# Patient Record
Sex: FEMALE | Race: WHITE | NOT HISPANIC OR LATINO | Employment: FULL TIME | URBAN - METROPOLITAN AREA
[De-identification: names, ages, dates, MRNs, and addresses within clinical notes are randomized per-mention and may not be internally consistent; named-entity substitution may affect disease eponyms.]

---

## 2018-04-04 LAB — HCV AB SER-ACNC: NEGATIVE

## 2019-08-27 ENCOUNTER — OFFICE VISIT (OUTPATIENT)
Dept: FAMILY MEDICINE CLINIC | Facility: CLINIC | Age: 54
End: 2019-08-27
Payer: COMMERCIAL

## 2019-08-27 VITALS
HEIGHT: 64 IN | HEART RATE: 45 BPM | DIASTOLIC BLOOD PRESSURE: 82 MMHG | BODY MASS INDEX: 37.56 KG/M2 | TEMPERATURE: 97.8 F | SYSTOLIC BLOOD PRESSURE: 128 MMHG | RESPIRATION RATE: 14 BRPM | WEIGHT: 220 LBS | OXYGEN SATURATION: 99 %

## 2019-08-27 DIAGNOSIS — Z12.31 ENCOUNTER FOR SCREENING MAMMOGRAM FOR BREAST CANCER: ICD-10-CM

## 2019-08-27 DIAGNOSIS — I10 ESSENTIAL HYPERTENSION: ICD-10-CM

## 2019-08-27 DIAGNOSIS — F51.01 PRIMARY INSOMNIA: ICD-10-CM

## 2019-08-27 DIAGNOSIS — M17.0 PRIMARY OSTEOARTHRITIS OF BOTH KNEES: ICD-10-CM

## 2019-08-27 DIAGNOSIS — E78.2 MIXED HYPERLIPIDEMIA: Primary | ICD-10-CM

## 2019-08-27 DIAGNOSIS — Z12.11 SCREENING FOR COLORECTAL CANCER: ICD-10-CM

## 2019-08-27 DIAGNOSIS — Z12.12 SCREENING FOR COLORECTAL CANCER: ICD-10-CM

## 2019-08-27 DIAGNOSIS — I48.0 PAF (PAROXYSMAL ATRIAL FIBRILLATION) (HCC): ICD-10-CM

## 2019-08-27 DIAGNOSIS — Z29.9 PREVENTIVE MEASURE: ICD-10-CM

## 2019-08-27 PROCEDURE — 3008F BODY MASS INDEX DOCD: CPT | Performed by: FAMILY MEDICINE

## 2019-08-27 PROCEDURE — 99204 OFFICE O/P NEW MOD 45 MIN: CPT | Performed by: FAMILY MEDICINE

## 2019-08-27 PROCEDURE — 3074F SYST BP LT 130 MM HG: CPT | Performed by: FAMILY MEDICINE

## 2019-08-27 PROCEDURE — 1036F TOBACCO NON-USER: CPT | Performed by: FAMILY MEDICINE

## 2019-08-27 RX ORDER — TRAMADOL HYDROCHLORIDE 50 MG/1
50 TABLET ORAL EVERY 6 HOURS PRN
Qty: 60 TABLET | Refills: 1 | Status: SHIPPED | OUTPATIENT
Start: 2019-08-27 | End: 2021-08-10

## 2019-08-27 RX ORDER — FLECAINIDE ACETATE 50 MG/1
50 TABLET ORAL 2 TIMES DAILY
COMMUNITY
Start: 2019-05-16 | End: 2019-08-27 | Stop reason: SDUPTHER

## 2019-08-27 RX ORDER — ZOLPIDEM TARTRATE 10 MG/1
10 TABLET ORAL DAILY PRN
COMMUNITY
Start: 2019-06-07 | End: 2019-08-27 | Stop reason: SDUPTHER

## 2019-08-27 RX ORDER — NICOTINE POLACRILEX 2 MG
1 GUM BUCCAL DAILY
COMMUNITY
End: 2021-08-10

## 2019-08-27 RX ORDER — FLECAINIDE ACETATE 50 MG/1
50 TABLET ORAL 2 TIMES DAILY
Qty: 180 TABLET | Refills: 0 | Status: SHIPPED | OUTPATIENT
Start: 2019-08-27 | End: 2019-10-25 | Stop reason: SDUPTHER

## 2019-08-27 RX ORDER — METOPROLOL SUCCINATE 25 MG/1
12.5 TABLET, EXTENDED RELEASE ORAL DAILY
COMMUNITY
Start: 2018-10-17 | End: 2019-08-27 | Stop reason: SDUPTHER

## 2019-08-27 RX ORDER — TRAMADOL HYDROCHLORIDE 50 MG/1
50 TABLET ORAL DAILY
COMMUNITY
Start: 2019-06-10 | End: 2019-08-27 | Stop reason: SDUPTHER

## 2019-08-27 RX ORDER — METOPROLOL SUCCINATE 25 MG/1
12.5 TABLET, EXTENDED RELEASE ORAL DAILY
Qty: 90 TABLET | Refills: 3 | Status: SHIPPED | OUTPATIENT
Start: 2019-08-27 | End: 2019-10-25 | Stop reason: SDUPTHER

## 2019-08-27 RX ORDER — ZOLPIDEM TARTRATE 10 MG/1
10 TABLET ORAL
Qty: 90 TABLET | Refills: 0 | Status: SHIPPED | OUTPATIENT
Start: 2019-08-27 | End: 2021-08-11

## 2019-08-27 RX ORDER — ALPRAZOLAM 0.25 MG/1
1 TABLET ORAL AS NEEDED
COMMUNITY
Start: 2019-02-26 | End: 2019-08-27

## 2019-08-27 NOTE — PATIENT INSTRUCTIONS
Obtain labs  Make appt with cards and gyn  F/u 1 year or as needed    Wellness Visit for Adults   AMBULATORY CARE:   A wellness visit  is when you see your healthcare provider to get screened for health problems  You can also get advice on how to stay healthy  Write down your questions so you remember to ask them  Ask your healthcare provider how often you should have a wellness visit  What happens at a wellness visit:  Your healthcare provider will ask about your health, and your family history of health problems  This includes high blood pressure, heart disease, and cancer  He or she will ask if you have symptoms that concern you, if you smoke, and about your mood  You may also be asked about your intake of medicines, supplements, food, and alcohol  Any of the following may be done:  · Your weight  will be checked  Your height may also be checked so your body mass index (BMI) can be calculated  Your BMI shows if you are at a healthy weight  · Your blood pressure  and heart rate will be checked  Your temperature may also be checked  · Blood and urine tests  may be done  Blood tests may be done to check your cholesterol levels  Abnormal cholesterol levels increase your risk for heart disease and stroke  You may also need a blood or urine test to check for diabetes if you are at increased risk  Urine tests may be done to look for signs of an infection or kidney disease  · A physical exam  includes checking your heartbeat and lungs with a stethoscope  Your healthcare provider may also check your skin to look for sun damage  · Screening tests  may be recommended  A screening test is done to check for diseases that may not cause symptoms  The screening tests you may need depend on your age, gender, family history, and lifestyle habits  For example, colorectal screening may be recommended if you are 48years old or older    Screening tests you need if you are a woman:   · A Pap smear  is used to screen for cervical cancer  Pap smears are usually done every 3 to 5 years depending on your age  You may need them more often if you have had abnormal Pap smear test results in the past  Ask your healthcare provider how often you should have a Pap smear  · A mammogram  is an x-ray of your breasts to screen for breast cancer  Experts recommend mammograms every 2 years starting at age 48 years  You may need a mammogram at age 52 years or younger if you have an increased risk for breast cancer  Talk to your healthcare provider about when you should start having mammograms and how often you need them  Vaccines you may need:   · Get an influenza vaccine  every year  The influenza vaccine protects you from the flu  Several types of viruses cause the flu  The viruses change over time, so new vaccines are made each year  · Get a tetanus-diphtheria (Td) booster vaccine  every 10 years  This vaccine protects you against tetanus and diphtheria  Tetanus is a severe infection that may cause painful muscle spasms and lockjaw  Diphtheria is a severe bacterial infection that causes a thick covering in the back of your mouth and throat  · Get a human papillomavirus (HPV) vaccine  if you are female and aged 23 to 32 or male 23 to 24 and never received it  This vaccine protects you from HPV infection  HPV is the most common infection spread by sexual contact  HPV may also cause vaginal, penile, and anal cancers  · Get a pneumococcal vaccine  if you are aged 72 years or older  The pneumococcal vaccine is an injection given to protect you from pneumococcal disease  Pneumococcal disease is an infection caused by pneumococcal bacteria  The infection may cause pneumonia, meningitis, or an ear infection  · Get a shingles vaccine  if you are aged 61 or older, even if you have had shingles before  The shingles vaccine is an injection to protect you from the varicella-zoster virus  This is the same virus that causes chickenpox  Shingles is a painful rash that develops in people who had chickenpox or have been exposed to the virus  How to eat healthy:  My Plate is a model for planning healthy meals  It shows the types and amounts of foods that should go on your plate  Fruits and vegetables make up about half of your plate, and grains and protein make up the other half  A serving of dairy is included on the side of your plate  The amount of calories and serving sizes you need depends on your age, gender, weight, and height  Examples of healthy foods are listed below:  · Eat a variety of vegetables  such as dark green, red, and orange vegetables  You can also include canned vegetables low in sodium (salt) and frozen vegetables without added butter or sauces  · Eat a variety of fresh fruits , canned fruit in 100% juice, frozen fruit, and dried fruit  · Include whole grains  At least half of the grains you eat should be whole grains  Examples include whole-wheat bread, wheat pasta, brown rice, and whole-grain cereals such as oatmeal     · Eat a variety of protein foods such as seafood (fish and shellfish), lean meat, and poultry without skin (turkey and chicken)  Examples of lean meats include pork leg, shoulder, or tenderloin, and beef round, sirloin, tenderloin, and extra lean ground beef  Other protein foods include eggs and egg substitutes, beans, peas, soy products, nuts, and seeds  · Choose low-fat dairy products such as skim or 1% milk or low-fat yogurt, cheese, and cottage cheese  · Limit unhealthy fats  such as butter, hard margarine, and shortening  Exercise:  Exercise at least 30 minutes per day on most days of the week  Some examples of exercise include walking, biking, dancing, and swimming  You can also fit in more physical activity by taking the stairs instead of the elevator or parking farther away from stores  Include muscle strengthening activities 2 days each week   Regular exercise provides many health benefits  It helps you manage your weight, and decreases your risk for type 2 diabetes, heart disease, stroke, and high blood pressure  Exercise can also help improve your mood  Ask your healthcare provider about the best exercise plan for you  General health and safety guidelines:   · Do not smoke  Nicotine and other chemicals in cigarettes and cigars can cause lung damage  Ask your healthcare provider for information if you currently smoke and need help to quit  E-cigarettes or smokeless tobacco still contain nicotine  Talk to your healthcare provider before you use these products  · Limit alcohol  A drink of alcohol is 12 ounces of beer, 5 ounces of wine, or 1½ ounces of liquor  · Lose weight, if needed  Being overweight increases your risk of certain health conditions  These include heart disease, high blood pressure, type 2 diabetes, and certain types of cancer  · Protect your skin  Do not sunbathe or use tanning beds  Use sunscreen with a SPF 15 or higher  Apply sunscreen at least 15 minutes before you go outside  Reapply sunscreen every 2 hours  Wear protective clothing, hats, and sunglasses when you are outside  · Drive safely  Always wear your seatbelt  Make sure everyone in your car wears a seatbelt  A seatbelt can save your life if you are in an accident  Do not use your cell phone when you are driving  This could distract you and cause an accident  Pull over if you need to make a call or send a text message  · Practice safe sex  Use latex condoms if are sexually active and have more than one partner  Your healthcare provider may recommend screening tests for sexually transmitted infections (STIs)  · Wear helmets, lifejackets, and protective gear  Always wear a helmet when you ride a bike or motorcycle, go skiing, or play sports that could cause a head injury  Wear protective equipment when you play sports  Wear a lifejacket when you are on a boat or doing water sports    © 2017 [facility] Information is for End User's use only and may not be sold, redistributed or otherwise used for commercial purposes  All illustrations and images included in CareNotes® are the copyrighted property of [employer]  or Jem [employer]al  The above information is an  only  It is not intended as medical advice for individual conditions or treatments  Talk to your doctor, nurse or pharmacist before following any medical regimen to see if it is safe and effective for you  Weight Management   AMBULATORY CARE:   Why it is important to manage your weight:  Being overweight increases your risk of health conditions such as heart disease, high blood pressure, type 2 diabetes, and certain types of cancer  It can also increase your risk for osteoarthritis, sleep apnea, and other respiratory problems  Aim for a slow, steady weight loss  Even a small amount of weight loss can lower your risk of health problems  How to lose weight safely:  A safe and healthy way to lose weight is to eat fewer calories and get regular exercise  You can lose up about 1 pound a week by decreasing the number of calories you eat by 500 calories each day  You can decrease calories by eating smaller portion sizes or by cutting out high-calorie foods  Read labels to find out how many calories are in the foods you eat  You can also burn calories with exercise such as walking, swimming, or biking  You will be more likely to keep weight off if you make these changes part of your lifestyle  Healthy meal plan for weight management:  A healthy meal plan includes a variety of foods, contains fewer calories, and helps you stay healthy  A healthy meal plan includes the following:  · Eat whole-grain foods more often  A healthy meal plan should contain fiber  Fiber is the part of grains, fruits, and vegetables that is not broken down by your body   Whole-grain foods are healthy and provide extra fiber in your diet  Some examples of whole-grain foods are whole-wheat breads and pastas, oatmeal, brown rice, and bulgur  · Eat a variety of vegetables every day  Include dark, leafy greens such as spinach, kale, jaclyn greens, and mustard greens  Eat yellow and orange vegetables such as carrots, sweet potatoes, and winter squash  · Eat a variety of fruits every day  Choose fresh or canned fruit (canned in its own juice or light syrup) instead of juice  Fruit juice has very little or no fiber  · Eat low-fat dairy foods  Drink fat-free (skim) milk or 1% milk  Eat fat-free yogurt and low-fat cottage cheese  Try low-fat cheeses such as mozzarella and other reduced-fat cheeses  · Choose meat and other protein foods that are low in fat  Choose beans or other legumes such as split peas or lentils  Choose fish, skinless poultry (chicken or turkey), or lean cuts of red meat (beef or pork)  Before you cook meat or poultry, cut off any visible fat  · Use less fat and oil  Try baking foods instead of frying them  Add less fat, such as margarine, sour cream, regular salad dressing and mayonnaise to foods  Eat fewer high-fat foods  Some examples of high-fat foods include french fries, doughnuts, ice cream, and cakes  · Eat fewer sweets  Limit foods and drinks that are high in sugar  This includes candy, cookies, regular soda, and sweetened drinks  Ways to decrease calories:   · Eat smaller portions  ¨ Use a small plate with smaller servings  ¨ Do not eat second helpings  ¨ When you eat at a restaurant, ask for a box and place half of your meal in the box before you eat  ¨ Share an entrée with someone else  · Replace high-calorie snacks with healthy, low-calorie snacks  ¨ Choose fresh fruit, vegetables, fat-free rice cakes, or air-popped popcorn instead of potato chips, nuts, or chocolate  ¨ Choose water or calorie-free drinks instead of soda or sweetened drinks  · Eat regular meals  Skipping meals can lead to overeating later in the day  Eat a healthy snack in place of a meal if you do not have time to eat a regular meal      · Do not shop for groceries when you are hungry  You may be more likely to make unhealthy food choices  Take a grocery list of healthy foods and shop after you have eaten  Exercise:  Exercise at least 30 minutes per day on most days of the week  Some examples of exercise include walking, biking, dancing, and swimming  You can also fit in more physical activity by taking the stairs instead of the elevator or parking farther away from stores  Ask your healthcare provider about the best exercise plan for you  Other things to consider as you try to lose weight:   · Be aware of situations that may give you the urge to overeat, such as eating while watching television  Find ways to avoid these situations  For example, read a book, go for a walk, or do crafts  · Meet with a weight loss support group or friends who are also trying to lose weight  This may help you stay motivated to continue working on your weight loss goals  © 2017 [facility] Information is for End User's use only and may not be sold, redistributed or otherwise used for commercial purposes  All illustrations and images included in CareNotes® are the copyrighted property of [employer]  or Jem Bernal  The above information is an  only  It is not intended as medical advice for individual conditions or treatments  Talk to your doctor, nurse or pharmacist before following any medical regimen to see if it is safe and effective for you

## 2019-08-27 NOTE — PROGRESS NOTES
Assessment/Plan:    No problem-specific Assessment & Plan notes found for this encounter  Diagnoses and all orders for this visit:    Mixed hyperlipidemia  Comments:  labs as ordered  Orders:  -     Lipid panel; Future    Essential hypertension  Comments:  controlled on current meds  continue same  labs as ordered  Orders:  -     CBC and differential; Future  -     Comprehensive metabolic panel; Future  -     TSH, 3rd generation; Future    PAF (paroxysmal atrial fibrillation) (HCC)  Comments:  sinus currently  refer to cardiology  will cover meds until she gets in with cards  continue rate/rhythm control and a/c    Orders:  -     Ambulatory referral to Cardiology; Future  -     flecainide (TAMBOCOR) 50 mg tablet; Take 1 tablet (50 mg total) by mouth 2 (two) times a day  -     metoprolol succinate (TOPROL-XL) 25 mg 24 hr tablet; Take 0 5 tablets (12 5 mg total) by mouth daily  -     rivaroxaban (XARELTO) 20 mg tablet; Take 1 tablet (20 mg total) by mouth daily    Primary osteoarthritis of both knees  Comments:  due to being on a/c, NSAIDS contraindicated  tylenol and injections unhelpful  tramadol prn okay given pt's sparing use  Orders:  -     traMADol (ULTRAM) 50 mg tablet; Take 1 tablet (50 mg total) by mouth every 6 (six) hours as needed for moderate pain    Primary insomnia  Comments:  on ambien for years daily  will continue same  Orders:  -     zolpidem (AMBIEN) 10 mg tablet; Take 1 tablet (10 mg total) by mouth daily at bedtime    Encounter for screening mammogram for breast cancer  -     Mammo screening bilateral w 3d & cad; Future    Screening for colorectal cancer  -     Occult Blood, Fecal Immunochemical (FIT); Future    Preventive measure  -     Ambulatory referral to Obstetrics / Gynecology; Future    Other orders  -     Discontinue: zolpidem (AMBIEN) 10 mg tablet; Take 10 mg by mouth daily as needed  -     Discontinue: traMADol (ULTRAM) 50 mg tablet;  Take 50 mg by mouth daily  -     Discontinue: rivaroxaban (XARELTO) 20 mg tablet; Take 20 mg by mouth daily  -     Discontinue: metoprolol succinate (TOPROL-XL) 25 mg 24 hr tablet; Take 12 5 mg by mouth daily  -     Discontinue: flecainide (TAMBOCOR) 50 mg tablet; Take 50 mg by mouth 2 (two) times a day  -     Discontinue: ALPRAZolam (XANAX) 0 25 mg tablet; Take 1 tablet by mouth as needed  -     Multiple Vitamins-Minerals (MULTIVITAMIN ADULT PO); Take 1 tablet by mouth daily  -     cyanocobalamin (VITAMIN B-12) 1000 MCG tablet; Take by mouth daily  -     Biotin 1 MG CAPS; Take 1 tablet by mouth daily        Subjective:      Patient ID: Cleopatra Moffett is a 52 y o  female  HPI  Pt presents as new pt  Has hx of PAF  Wants to switch over from [facility]  Can tell when she is in/out of afib  Episodes are generally self-limited  Tolerating meds  No fatigue    +hx of bariatric surgery  Has lost >100lbs  Working on getting back to weight loss after her mom and   within 9 weeks of each other last year  She is doing okay from a grief standpoint  Living with son and went on a  vacation with him  She feels content  Feeling well overall  Due for gyn, crc, breast ca, hep c screening  +hx knee OA  Due to a/c, using tramadol very sparingly  Has had injections, but they have not been helpful  The following portions of the patient's history were reviewed and updated as appropriate: allergies, current medications, past family history, past medical history, past social history, past surgical history and problem list     Review of Systems   Constitutional: Negative for chills, fatigue, fever and unexpected weight change  HENT: Negative for congestion, ear pain, hearing loss, postnasal drip, rhinorrhea, sinus pressure, sinus pain, sore throat, trouble swallowing and voice change  Eyes: Negative for pain, redness and visual disturbance  Respiratory: Negative for cough and shortness of breath      Cardiovascular: Negative for chest pain, palpitations and leg swelling  Gastrointestinal: Negative for abdominal pain, constipation, diarrhea and nausea  Endocrine: Negative for cold intolerance, heat intolerance, polydipsia and polyuria  Genitourinary: Negative for dysuria, frequency and urgency  Musculoskeletal: Negative for arthralgias, joint swelling and myalgias  Skin: Negative for rash  No suspicious lesions   Neurological: Negative for weakness, numbness and headaches  Hematological: Negative for adenopathy  Objective:      /82   Pulse (!) 45   Temp 97 8 °F (36 6 °C) (Tympanic)   Resp 14   Ht 5' 4 17" (1 63 m)   Wt 99 8 kg (220 lb)   SpO2 99%   BMI 37 56 kg/m²          Physical Exam   Constitutional: She is oriented to person, place, and time  She appears well-developed and well-nourished  No distress  HENT:   Head: Normocephalic and atraumatic  Right Ear: Tympanic membrane, external ear and ear canal normal    Left Ear: Tympanic membrane, external ear and ear canal normal    Nose: Nose normal    Mouth/Throat: Oropharynx is clear and moist and mucous membranes are normal  No oropharyngeal exudate  Eyes: Pupils are equal, round, and reactive to light  Conjunctivae and EOM are normal    Neck: No JVD present  Carotid bruit is not present  No thyromegaly present  Cardiovascular: Regular rhythm, S1 normal and S2 normal  Exam reveals no gallop, no S3, no S4 and no friction rub  No murmur heard  Pulmonary/Chest: Effort normal and breath sounds normal  She has no wheezes  She has no rhonchi  She has no rales  Abdominal: Soft  Bowel sounds are normal  She exhibits no distension  There is no tenderness  Lymphadenopathy:     She has no cervical adenopathy  Neurological: She is alert and oriented to person, place, and time  She has normal strength and normal reflexes  No cranial nerve deficit or sensory deficit

## 2019-10-25 ENCOUNTER — OFFICE VISIT (OUTPATIENT)
Dept: CARDIOLOGY CLINIC | Facility: CLINIC | Age: 54
End: 2019-10-25
Payer: COMMERCIAL

## 2019-10-25 VITALS
BODY MASS INDEX: 37.76 KG/M2 | HEIGHT: 64 IN | DIASTOLIC BLOOD PRESSURE: 88 MMHG | HEART RATE: 48 BPM | OXYGEN SATURATION: 97 % | SYSTOLIC BLOOD PRESSURE: 132 MMHG

## 2019-10-25 DIAGNOSIS — I10 ESSENTIAL HYPERTENSION: ICD-10-CM

## 2019-10-25 DIAGNOSIS — I48.0 PAF (PAROXYSMAL ATRIAL FIBRILLATION) (HCC): ICD-10-CM

## 2019-10-25 DIAGNOSIS — I48.0 PAF (PAROXYSMAL ATRIAL FIBRILLATION) (HCC): Primary | ICD-10-CM

## 2019-10-25 PROCEDURE — 93000 ELECTROCARDIOGRAM COMPLETE: CPT | Performed by: INTERNAL MEDICINE

## 2019-10-25 PROCEDURE — 99244 OFF/OP CNSLTJ NEW/EST MOD 40: CPT | Performed by: INTERNAL MEDICINE

## 2019-10-25 RX ORDER — METOPROLOL SUCCINATE 25 MG/1
25 TABLET, EXTENDED RELEASE ORAL DAILY
Qty: 90 TABLET | Refills: 3 | Status: SHIPPED | OUTPATIENT
Start: 2019-10-25 | End: 2020-12-04 | Stop reason: SDUPTHER

## 2019-10-25 RX ORDER — FLECAINIDE ACETATE 50 MG/1
50 TABLET ORAL 2 TIMES DAILY
Qty: 180 TABLET | Refills: 3 | Status: SHIPPED | OUTPATIENT
Start: 2019-10-25 | End: 2020-09-11 | Stop reason: SDUPTHER

## 2019-10-25 NOTE — PROGRESS NOTES
Cardiology Consultation     Kady Pod  [id number]  1965  [address] [facility] [place] [address] [facility] [id number]      1  PAF (paroxysmal atrial fibrillation) ([address] )  Ambulatory referral to Cardiology    POCT ECG    Holter monitor - 48 hour    rivaroxaban (XARELTO) 20 mg tablet    flecainide (TAMBOCOR) 50 mg tablet    metoprolol succinate (TOPROL-XL) 25 mg 24 hr tablet   2  Essential hypertension     3  PAF (paroxysmal atrial fibrillation) ([facility])  Ambulatory referral to Cardiology    POCT ECG    Holter monitor - 48 hour    rivaroxaban (XARELTO) 20 mg tablet    flecainide (TAMBOCOR) 50 mg tablet    metoprolol succinate (TOPROL-XL) 25 mg 24 hr tablet    sinus currently  refer to cardiology  will cover meds until she gets in with cards  continue rate/rhythm control and a/c         Discussion/Summary:    - paroxysmal atrial fibrillation:  Maintaining sinus rhythm with flecainide and low-dose metoprolol  She is currently taking 25 mg daily  She reports some episodes of palpitations which she believes are atrial fibrillation  Recommend Holter monitor to document whether this as the case  She wants to increase some of her medication to try to decrease the frequency of some of these are palpitations, but I recommended seen with this is 1st, and also monitoring to see if she has any significant bradycardia  If heart rate is reasonably controlled and we do see any evidence of arrhythmias, we can increase her flecainide to 100 mg twice a day  Long discussion about anticoagulation  She is female, previously with hypertension but this is much better controlled now  Chads Vasc score is 1, maybe 2  Overall, I recommend continuing anticoagulation if at all she can  If this is cost prohibitive, then we can discontinue but should her risk factors change she would need to resume    I gave her a different cost savings card for Xarelto  Explained to her that Coumadin would be the only generic option, which she is not interested in  History of Present Illness:    57-year-old female  Referred to the office today by Dr Michael Rolle to establish for paroxysmal atrial fibrillation  Previous history of hypertension, but currently better controlled now that she has lost weight post gastric bypass  She has paroxysmal atrial fibrillation  Discovered several years ago when she was at work  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  She was having palpitations and heart rate was irregular  She was escorted to the hospital where she was found to be in atrial fibrillation  She ultimately cardioverted with medical therapy, did not require cardioversion  Since that time, she has been on flecainide, metoprolol, and Xarelto  Despite cost savings cards, she finds Xarelto quite expensive  She was not taking this regularly at times has had to take this every other day or has been off of it for a few months at a time because of cost reasons  She has not had any bruising or bleeding  No history of stroke  She feels occasional palpitations  These have been attributed to short bursts of atrial fibrillation, but she denies having any Holter monitor in the interim  She did have a stress test which was unremarkable in 2016  Echocardiogram in the past has also showed normal function      Patient Active Problem List   Diagnosis    Dysthymic disorder    Encounter for screening mammogram for breast cancer    Essential hypertension    Family history of heart disease    Knee pain, bilateral    Mixed hyperlipidemia    PAF (paroxysmal atrial fibrillation) (HCC)    Snoring     Past Medical History:   Diagnosis Date    [facility]fib ([facility] )     Anxiety     Arthritis     Knees and back     Depression     Hiatal hernia     Hyperlipidemia     Hypertension     Morbid obesity ([facility] )     Pneumonia      Social History     Tobacco Use    Smoking status: Never Smoker    Smokeless tobacco: Never Used   Substance Use Topics    Alcohol use: Not Currently     Frequency: Never     Comment: No alcohol - As per Care Everywhere     Drug use: Not on file      Family History   Problem Relation Age of Onset    Arthritis Mother    [facility] COPD Mother     Diabetes Mother     Obesity Mother     Coronary artery disease Father     Diabetes Father     Heart attack Father     Heart disease Father     Obesity Father     Alzheimer's disease Paternal Grandfather     Dementia Paternal Grandfather      Past Surgical History:   Procedure Laterality Date    BACK SURGERY  1998    ESOPHAGOGASTRODUODENOSCOPY  10/17/2016    HIATAL HERNIA REPAIR  10/17/2016    T Herbert Soriano DO       LUMBAR One [address] SURGERY  05/1998    Approx    PARTIAL GASTRECTOMY  10/17/2016    T Herbert Soriano DO       VAGINAL DELIVERY      x1       Current Outpatient Medications:     Biotin 1 MG CAPS, Take 1 tablet by mouth daily, Disp: , Rfl:     cyanocobalamin (VITAMIN B-12) 1000 MCG tablet, Take by mouth daily, Disp: , Rfl:     flecainide (TAMBOCOR) 50 mg tablet, Take 1 tablet (50 mg total) by mouth 2 (two) times a day, Disp: 180 tablet, Rfl: 3    metoprolol succinate (TOPROL-XL) 25 mg 24 hr tablet, Take 1 tablet (25 mg total) by mouth daily, Disp: 90 tablet, Rfl: 3    Multiple Vitamins-Minerals (MULTIVITAMIN ADULT PO), Take 1 tablet by mouth daily, Disp: , Rfl:     rivaroxaban (XARELTO) 20 mg tablet, Take 1 tablet (20 mg total) by mouth daily, Disp: 90 tablet, Rfl: 3    traMADol (ULTRAM) 50 mg tablet, Take 1 tablet (50 mg total) by mouth every 6 (six) hours as needed for moderate pain, Disp: 60 tablet, Rfl: 1    zolpidem (AMBIEN) 10 mg tablet, Take 1 tablet (10 mg total) by mouth daily at bedtime, Disp: 90 tablet, Rfl: 0  No Known Allergies    Vitals:    10/25/19 1441   BP: 132/88   BP Location: Left arm   Patient Position: Sitting   Cuff Size: Standard   Pulse: (!) 48   SpO2: 97% Height: 5' 4" (1 626 m)     Vitals:      Height: 5' 4" (162 6 cm)   Body mass index is 37 76 kg/m²  Physical Exam:  GENERAL: Alert, well appearing, and in no distress  HEENT:  PERRL, EOMI, no scleral icterus, no conjunctival pallor  NECK:  Supple, No elevated JVP, no thyromegaly, no carotid bruits  HEART:  Regular rate and rhythm, normal S1/S2, no S3/S4, no murmur or rub  LUNGS:  Clear to auscultation bilaterally  ABDOMEN:  Soft, non-tender, positive bowel sounds, no rebound or guarding  EXTREMITIES:  No edema  VASCULAR:  Normal pedal pulses   NEURO: No focal deficits,  SKIN: Normal without suspicious lesions on exposed skin      ROS:  Positive for knee pain with arthritis, palpitations  Except as noted in HPI, is otherwise reviewed in detail and a 12 point review of systems is negative  Labs:  No results found for: NA, K, CL, CREATININE, BUN, CO2, ALT, AST, TSH, INR, GLUF, HGBA1C, WBC, HGB, HCT, PLT    No results found for: CHOL  No results found for: HDL  No results found for: LDLCALC  No results found for: TRIG    EKG:  Sinus bradycardia  52 beats per minute  PAC  Otherwise normal EKG

## 2020-02-06 ENCOUNTER — TELEPHONE (OUTPATIENT)
Dept: FAMILY MEDICINE CLINIC | Facility: CLINIC | Age: 55
End: 2020-02-06

## 2020-02-06 NOTE — TELEPHONE ENCOUNTER
Patient would like to speak with you about getting a prescription for Phentermine  Would you please call her to discuss this medication and to answer some questions she has about it and starting it

## 2020-02-07 NOTE — TELEPHONE ENCOUNTER
Placed call to patient, left a non detailed message to return our call regarding her medication request

## 2020-02-07 NOTE — TELEPHONE ENCOUNTER
Patient returned call, she had weight loss surgery a few years ago and has put some weight back on  She has had some stressors in her life, mother and  both passed within weeks (1 year ago)  Patient has joined weight watchers to help adjust her eating habits  Patient admits to stress eating  Patient has never been on medication, however, he family has been on it and she would like to jump start her weight loss  Not looking for a long term solution  Advised patient is due for PE and she may need to come into our office for an OV to discuss, patient requested we ask Dr Aditi Gaiatn prior to making an appointment

## 2020-02-07 NOTE — TELEPHONE ENCOUNTER
Let Gabi Dickson know I don't do weight loss drugs, but if she'd like, I can refer her to weight mgmt

## 2020-08-26 ENCOUNTER — TELEPHONE (OUTPATIENT)
Dept: FAMILY MEDICINE CLINIC | Facility: CLINIC | Age: 55
End: 2020-08-26

## 2020-09-11 DIAGNOSIS — I48.0 PAF (PAROXYSMAL ATRIAL FIBRILLATION) (HCC): ICD-10-CM

## 2020-09-11 RX ORDER — FLECAINIDE ACETATE 50 MG/1
50 TABLET ORAL 2 TIMES DAILY
Qty: 180 TABLET | Refills: 3 | Status: SHIPPED | OUTPATIENT
Start: 2020-09-11 | End: 2021-07-21 | Stop reason: SDUPTHER

## 2020-11-24 ENCOUNTER — VBI (OUTPATIENT)
Dept: ADMINISTRATIVE | Facility: OTHER | Age: 55
End: 2020-11-24

## 2020-12-04 ENCOUNTER — OFFICE VISIT (OUTPATIENT)
Dept: CARDIOLOGY CLINIC | Facility: CLINIC | Age: 55
End: 2020-12-04
Payer: COMMERCIAL

## 2020-12-04 VITALS
WEIGHT: 230 LBS | SYSTOLIC BLOOD PRESSURE: 120 MMHG | HEART RATE: 62 BPM | HEIGHT: 64 IN | BODY MASS INDEX: 39.27 KG/M2 | OXYGEN SATURATION: 99 % | DIASTOLIC BLOOD PRESSURE: 60 MMHG

## 2020-12-04 DIAGNOSIS — I48.0 PAF (PAROXYSMAL ATRIAL FIBRILLATION) (HCC): Primary | ICD-10-CM

## 2020-12-04 DIAGNOSIS — E78.2 MIXED HYPERLIPIDEMIA: ICD-10-CM

## 2020-12-04 DIAGNOSIS — I10 ESSENTIAL HYPERTENSION: ICD-10-CM

## 2020-12-04 DIAGNOSIS — I48.0 PAF (PAROXYSMAL ATRIAL FIBRILLATION) (HCC): ICD-10-CM

## 2020-12-04 PROCEDURE — 99214 OFFICE O/P EST MOD 30 MIN: CPT | Performed by: INTERNAL MEDICINE

## 2020-12-04 PROCEDURE — 3008F BODY MASS INDEX DOCD: CPT | Performed by: INTERNAL MEDICINE

## 2020-12-04 PROCEDURE — 3074F SYST BP LT 130 MM HG: CPT | Performed by: INTERNAL MEDICINE

## 2020-12-04 PROCEDURE — 3078F DIAST BP <80 MM HG: CPT | Performed by: INTERNAL MEDICINE

## 2020-12-04 PROCEDURE — 1036F TOBACCO NON-USER: CPT | Performed by: INTERNAL MEDICINE

## 2020-12-04 PROCEDURE — 93000 ELECTROCARDIOGRAM COMPLETE: CPT | Performed by: INTERNAL MEDICINE

## 2020-12-04 RX ORDER — METOPROLOL SUCCINATE 25 MG/1
25 TABLET, EXTENDED RELEASE ORAL DAILY
Qty: 90 TABLET | Refills: 3 | Status: SHIPPED | OUTPATIENT
Start: 2020-12-04 | End: 2022-01-09

## 2021-04-22 ENCOUNTER — VBI (OUTPATIENT)
Dept: ADMINISTRATIVE | Facility: OTHER | Age: 56
End: 2021-04-22

## 2021-06-16 ENCOUNTER — TELEPHONE (OUTPATIENT)
Dept: FAMILY MEDICINE CLINIC | Facility: CLINIC | Age: 56
End: 2021-06-16

## 2021-06-24 ENCOUNTER — TELEPHONE (OUTPATIENT)
Dept: FAMILY MEDICINE CLINIC | Facility: CLINIC | Age: 56
End: 2021-06-24

## 2021-06-24 NOTE — TELEPHONE ENCOUNTER
SHAE and sent letter for patient to call to schedule      Dear Ms Tamayo:    A recent review of your medical records indicate that you are now due for your annual exam     Please call our office at your earliest convenience to schedule an appointment  If you have already had the appropriate care elsewhere, please call our office and provide us with that information so we can update your records  Your partners in [facility],    [facility]  Mert Jefferson, Neto Dale, Yuliet Dennis, and Rodolfo Singh

## 2021-07-13 ENCOUNTER — OFFICE VISIT (OUTPATIENT)
Dept: URGENT CARE | Age: 56
End: 2021-07-13
Payer: COMMERCIAL

## 2021-07-13 VITALS
TEMPERATURE: 97.6 F | SYSTOLIC BLOOD PRESSURE: 168 MMHG | OXYGEN SATURATION: 99 % | RESPIRATION RATE: 16 BRPM | DIASTOLIC BLOOD PRESSURE: 92 MMHG | HEART RATE: 51 BPM

## 2021-07-13 DIAGNOSIS — N39.0 URINARY TRACT INFECTION WITH HEMATURIA, SITE UNSPECIFIED: Primary | ICD-10-CM

## 2021-07-13 DIAGNOSIS — R31.9 URINARY TRACT INFECTION WITH HEMATURIA, SITE UNSPECIFIED: Primary | ICD-10-CM

## 2021-07-13 LAB
SL AMB  POCT GLUCOSE, UA: ABNORMAL
SL AMB LEUKOCYTE ESTERASE,UA: ABNORMAL
SL AMB POCT BILIRUBIN,UA: ABNORMAL
SL AMB POCT BLOOD,UA: ABNORMAL
SL AMB POCT CLARITY,UA: ABNORMAL
SL AMB POCT COLOR,UA: YELLOW
SL AMB POCT KETONES,UA: ABNORMAL
SL AMB POCT NITRITE,UA: ABNORMAL
SL AMB POCT PH,UA: 6.5
SL AMB POCT SPECIFIC GRAVITY,UA: 1.01
SL AMB POCT URINE PROTEIN: ABNORMAL
SL AMB POCT UROBILINOGEN: 0.2

## 2021-07-13 PROCEDURE — 87086 URINE CULTURE/COLONY COUNT: CPT | Performed by: PHYSICIAN ASSISTANT

## 2021-07-13 PROCEDURE — 81002 URINALYSIS NONAUTO W/O SCOPE: CPT | Performed by: PHYSICIAN ASSISTANT

## 2021-07-13 PROCEDURE — 99213 OFFICE O/P EST LOW 20 MIN: CPT | Performed by: PHYSICIAN ASSISTANT

## 2021-07-13 RX ORDER — NITROFURANTOIN 25; 75 MG/1; MG/1
100 CAPSULE ORAL 2 TIMES DAILY
Qty: 14 CAPSULE | Refills: 0 | Status: SHIPPED | OUTPATIENT
Start: 2021-07-13 | End: 2021-07-20

## 2021-07-13 NOTE — PROGRESS NOTES
[facility]        NAME: Shiloh Kyle is a 60 y o  female  : 1965    MRN: [id number]  DATE: 2021  TIME: 7:33 PM    Assessment and Plan   Urinary tract infection with hematuria, site unspecified [N39 0, R31 9]  1  Urinary tract infection with hematuria, site unspecified  POCT urine dip    Urine culture    nitrofurantoin (MACROBID) 100 mg capsule         Patient Instructions       Follow up with PCP in 3-5 days  Proceed to  ER if symptoms worsen  Chief Complaint     Chief Complaint   Patient presents with    Flank Pain     since yesterday         History of Present Illness        Patient for evaluation back pain and urinary frequency  Review of Systems   Review of Systems   Constitutional: Negative  Genitourinary: Positive for frequency, hematuria and urgency  Negative for decreased urine volume, difficulty urinating, dysuria and pelvic pain  Musculoskeletal: Negative            Current Medications       Current Outpatient Medications:     Biotin 1 MG CAPS, Take 1 tablet by mouth daily (Patient not taking: Reported on 2021), Disp: , Rfl:     cyanocobalamin (VITAMIN B-12) 1000 MCG tablet, Take by mouth daily (Patient not taking: Reported on 2021), Disp: , Rfl:     flecainide (TAMBOCOR) 50 mg tablet, Take 1 tablet (50 mg total) by mouth 2 (two) times a day, Disp: 180 tablet, Rfl: 3    metoprolol succinate (TOPROL-XL) 25 mg 24 hr tablet, Take 1 tablet (25 mg total) by mouth daily, Disp: 90 tablet, Rfl: 3    Multiple Vitamins-Minerals (MULTIVITAMIN ADULT PO), Take 1 tablet by mouth daily (Patient not taking: Reported on 2021), Disp: , Rfl:     nitrofurantoin (MACROBID) 100 mg capsule, Take 1 capsule (100 mg total) by mouth 2 (two) times a day for 7 days, Disp: 14 capsule, Rfl: 0    rivaroxaban (XARELTO) 20 mg tablet, Take 1 tablet (20 mg total) by mouth daily, Disp: 90 tablet, Rfl: 3    traMADol (ULTRAM) 50 mg tablet, Take 1 tablet (50 mg total) by mouth every 6 (six) hours as needed for moderate pain (Patient not taking: Reported on 12/4/2020), Disp: 60 tablet, Rfl: 1    zolpidem (AMBIEN) 10 mg tablet, Take 1 tablet (10 mg total) by mouth daily at bedtime (Patient not taking: Reported on 12/4/2020), Disp: 90 tablet, Rfl: 0    Current Allergies     Allergies as of 07/13/2021    (No Known Allergies)            The following portions of the patient's history were reviewed and updated as appropriate: allergies, current medications, past family history, past medical history, past social history, past surgical history and problem list      Past Medical History:   Diagnosis Date    [facility]     Anxiety     Arthritis     Knees and back     Depression     Hiatal hernia     Hyperlipidemia     Hypertension     Morbid obesity ([facility] )     Pneumonia        Past Surgical History:   Procedure Laterality Date    BACK SURGERY  1998    ESOPHAGOGASTRODUODENOSCOPY  10/17/2016    HIATAL HERNIA REPAIR  10/17/2016    CIRO Whitaker DO       LUMBAR One [address] SURGERY  05/1998    Approx    PARTIAL GASTRECTOMY  10/17/2016    CIRO Whitaker DO       VAGINAL DELIVERY      x1       Family History   Problem Relation Age of Onset    Arthritis Mother     COPD Mother     Diabetes Mother     Obesity Mother     Coronary artery disease Father     Diabetes Father     Heart attack Father     Heart disease Father     Obesity Father     Alzheimer's disease Paternal Grandfather     Dementia Paternal Grandfather          Medications have been verified  Objective   /92   Pulse (!) 51   Temp 97 6 °F (36 4 °C)   Resp 16   SpO2 99%   No LMP recorded  Patient is postmenopausal        Physical Exam     Physical Exam  Vitals and nursing note reviewed  Constitutional:       General: She is not in acute distress  Appearance: Normal appearance  She is well-developed  She is not ill-appearing, toxic-appearing or diaphoretic     HENT:      Head: Normocephalic and atraumatic  Eyes:      Extraocular Movements: Extraocular movements intact  Conjunctiva/sclera: Conjunctivae normal       Pupils: Pupils are equal, round, and reactive to light  Cardiovascular:      Rate and Rhythm: Normal rate  Pulmonary:      Effort: Pulmonary effort is normal  No respiratory distress  Breath sounds: Normal breath sounds  No stridor  No wheezing, rhonchi or rales  Abdominal:      General: Abdomen is flat  There is no distension  Tenderness: There is no abdominal tenderness  There is no right CVA tenderness, left CVA tenderness, guarding or rebound  Skin:     General: Skin is warm and dry  Findings: No rash  Neurological:      General: No focal deficit present  Mental Status: She is alert and oriented to person, place, and time  Psychiatric:         Mood and Affect: Mood normal          Behavior: Behavior normal          Thought Content:  Thought content normal          Judgment: Judgment normal

## 2021-07-14 LAB — BACTERIA UR CULT: NORMAL

## 2021-07-15 ENCOUNTER — TELEPHONE (OUTPATIENT)
Dept: URGENT CARE | Age: 56
End: 2021-07-15

## 2021-07-19 ENCOUNTER — TELEPHONE (OUTPATIENT)
Dept: CARDIOLOGY CLINIC | Facility: CLINIC | Age: 56
End: 2021-07-19

## 2021-07-19 DIAGNOSIS — I48.0 PAF (PAROXYSMAL ATRIAL FIBRILLATION) (HCC): ICD-10-CM

## 2021-07-19 RX ORDER — FLECAINIDE ACETATE 50 MG/1
50 TABLET ORAL 2 TIMES DAILY
Qty: 60 TABLET | Refills: 11 | Status: CANCELLED | OUTPATIENT
Start: 2021-07-19

## 2021-07-19 NOTE — TELEPHONE ENCOUNTER
Pt called needs a refill of   rivaroxaban (XARELTO) 20 mg tablet   flecainide (TAMBOCOR) 50 mg tablet     Pt changed her insurance and no longer uses [facility]  Please remove from chart      Please send to[facility] [address]    Pt stated she is completely out of those meds

## 2021-07-21 DIAGNOSIS — I48.0 PAF (PAROXYSMAL ATRIAL FIBRILLATION) (HCC): ICD-10-CM

## 2021-07-21 RX ORDER — FLECAINIDE ACETATE 50 MG/1
50 TABLET ORAL 2 TIMES DAILY
Qty: 180 TABLET | Refills: 3 | Status: SHIPPED | OUTPATIENT
Start: 2021-07-21 | End: 2022-07-06

## 2021-07-21 NOTE — TELEPHONE ENCOUNTER
Patient is stating that the medications have not been received at her [facility] and she has been completely out of her medications for several days now

## 2021-08-02 ENCOUNTER — RA CDI HCC (OUTPATIENT)
Dept: OTHER | Facility: HOSPITAL | Age: 56
End: 2021-08-02

## 2021-08-03 NOTE — PROGRESS NOTES
[facility]  [facility] opportunities          Chart reviewed, no opportunity found: CHART REVIEWED, NO OPPORTUNITY FOUND                     Patients insurance company:  [employer] [facility] (Medicare Advantage and Commercial)

## 2021-08-10 ENCOUNTER — OFFICE VISIT (OUTPATIENT)
Dept: FAMILY MEDICINE CLINIC | Facility: CLINIC | Age: 56
End: 2021-08-10
Payer: COMMERCIAL

## 2021-08-10 VITALS
HEART RATE: 50 BPM | HEIGHT: 64 IN | BODY MASS INDEX: 39.48 KG/M2 | RESPIRATION RATE: 18 BRPM | TEMPERATURE: 97.6 F | DIASTOLIC BLOOD PRESSURE: 78 MMHG | SYSTOLIC BLOOD PRESSURE: 160 MMHG | OXYGEN SATURATION: 96 %

## 2021-08-10 DIAGNOSIS — Z12.11 SCREEN FOR COLON CANCER: ICD-10-CM

## 2021-08-10 DIAGNOSIS — Z00.00 PHYSICAL EXAM, ANNUAL: Primary | ICD-10-CM

## 2021-08-10 DIAGNOSIS — E78.2 MIXED HYPERLIPIDEMIA: ICD-10-CM

## 2021-08-10 DIAGNOSIS — Z11.4 SCREENING FOR HIV (HUMAN IMMUNODEFICIENCY VIRUS): ICD-10-CM

## 2021-08-10 DIAGNOSIS — Z12.31 SCREENING MAMMOGRAM, ENCOUNTER FOR: ICD-10-CM

## 2021-08-10 DIAGNOSIS — Z98.84 S/P BARIATRIC SURGERY: ICD-10-CM

## 2021-08-10 DIAGNOSIS — F41.1 GAD (GENERALIZED ANXIETY DISORDER): ICD-10-CM

## 2021-08-10 PROCEDURE — 99396 PREV VISIT EST AGE 40-64: CPT | Performed by: FAMILY MEDICINE

## 2021-08-10 RX ORDER — SERTRALINE HYDROCHLORIDE 100 MG/1
TABLET, FILM COATED ORAL
Qty: 30 TABLET | Refills: 3 | Status: SHIPPED | OUTPATIENT
Start: 2021-08-10 | End: 2021-08-11 | Stop reason: SDUPTHER

## 2021-08-10 RX ORDER — ALPRAZOLAM 0.5 MG/1
0.5 TABLET ORAL
Qty: 15 TABLET | Refills: 0 | Status: SHIPPED | OUTPATIENT
Start: 2021-08-10 | End: 2021-08-11 | Stop reason: SDUPTHER

## 2021-08-10 NOTE — PATIENT INSTRUCTIONS
Ramiro Medrano at [facility] zoloft 50mg daily x 2 weeks, then increase to 100mg daily  Xanax as needed  Obtain labs  F/u 6 weeks  Do ivanna

## 2021-08-11 DIAGNOSIS — F41.1 GAD (GENERALIZED ANXIETY DISORDER): ICD-10-CM

## 2021-08-11 RX ORDER — ALPRAZOLAM 0.5 MG/1
0.5 TABLET ORAL
Qty: 15 TABLET | Refills: 0 | Status: SHIPPED | OUTPATIENT
Start: 2021-08-11 | End: 2021-08-12 | Stop reason: SDUPTHER

## 2021-08-11 RX ORDER — SERTRALINE HYDROCHLORIDE 100 MG/1
TABLET, FILM COATED ORAL
Qty: 30 TABLET | Refills: 3 | Status: SHIPPED | OUTPATIENT
Start: 2021-08-11 | End: 2021-08-12 | Stop reason: SDUPTHER

## 2021-08-11 NOTE — PROGRESS NOTES
Assessment/Plan:    No problem-specific Assessment & Plan notes found for this encounter  Diagnoses and all orders for this visit:    Physical exam, annual  Comments:  labs as ordered  fix mood in order to help with weight and add in exercise  check mammogram  check cologuard    ANNABELLA (generalized anxiety disorder)  Comments:  discussed options  start zoloft daily and uptitrate to 50mg daily  xanax prn sparingly  Orders:  -     CBC and differential; Future  -     Comprehensive metabolic panel; Future  -     TSH, 3rd generation; Future  -     Discontinue: sertraline (ZOLOFT) 100 mg tablet; 1/2 tab daily x 2 weeks, then increase to 1 tab daily  -     Discontinue: ALPRAZolam (XANAX) 0 5 mg tablet; Take 1 tablet (0 5 mg total) by mouth daily at bedtime as needed for anxiety    Mixed hyperlipidemia  Comments:  labs as ordered  Orders:  -     Lipid panel; Future    S/P bariatric surgery  Comments:  labs as ordered  Orders:  -     Vitamin B12; Future  -     Vitamin D 25 hydroxy; Future  -     Folate; Future  -     Iron, TIBC and Ferritin Panel; Future    Screening mammogram, encounter for  -     Mammo screening bilateral w 3d & cad; Future    Screening for HIV (human immunodeficiency virus)  -     HIV 1/2 Antigen/Antibody (4th Generation) w Reflex [facility]; Future    Screen for colon cancer  -     Cologuard        Subjective:      Patient ID: Miguel Angel Carranza is a 60 y o  female  HPI  Pt presents for physical exam   Feeling well physically  Needs to get back to gym, but mood is a barrier  She notes increased depression, low mood, irritability, anxiety which has been worsening over the last 6 mo  Feels panic at times  No impulsivity  S/p bariatric surgery  Gaining weight due to stress eating  Not going to gym because she has little energy/motivation  Pt due for labs, mammogram, crc screening  Shingles shot if covered by insurance  covid series up to date      The following portions of the patient's history were reviewed and updated as appropriate: allergies, current medications, past family history, past medical history, past social history, past surgical history and problem list     Review of Systems   Constitutional: Negative for chills, fatigue, fever and unexpected weight change  HENT: Negative for congestion, ear pain, hearing loss, postnasal drip, rhinorrhea, sinus pressure, sinus pain, sore throat, trouble swallowing and voice change  Eyes: Negative for pain, redness and visual disturbance  Respiratory: Negative for cough and shortness of breath  Cardiovascular: Negative for chest pain, palpitations and leg swelling  Gastrointestinal: Negative for abdominal pain, constipation, diarrhea and nausea  Endocrine: Negative for cold intolerance, heat intolerance, polydipsia and polyuria  Genitourinary: Negative for dysuria, frequency and urgency  Musculoskeletal: Negative for arthralgias, joint swelling and myalgias  Skin: Negative for rash  No suspicious lesions   Neurological: Negative for weakness, numbness and headaches  Hematological: Negative for adenopathy  Psychiatric/Behavioral: Positive for agitation, dysphoric mood and sleep disturbance  Negative for suicidal ideas  The patient is nervous/anxious  Objective:      /78   Pulse (!) 50   Temp 97 6 °F (36 4 °C)   Resp 18   Ht 5' 4" (1 626 m)   SpO2 96%   BMI 39 48 kg/m²          Physical Exam  Constitutional:       General: She is not in acute distress  Appearance: She is well-developed  HENT:      Head: Normocephalic and atraumatic  Right Ear: Tympanic membrane, ear canal and external ear normal       Left Ear: Tympanic membrane, ear canal and external ear normal       Nose: Nose normal       Mouth/Throat:      Pharynx: No oropharyngeal exudate  Eyes:      Conjunctiva/sclera: Conjunctivae normal       Pupils: Pupils are equal, round, and reactive to light     Neck:      Thyroid: No thyromegaly  Vascular: No carotid bruit or JVD  Cardiovascular:      Rate and Rhythm: Regular rhythm  Heart sounds: S1 normal and S2 normal  No murmur heard  No friction rub  No gallop  No S3 or S4 sounds  Pulmonary:      Effort: Pulmonary effort is normal       Breath sounds: Normal breath sounds  No wheezing, rhonchi or rales  Abdominal:      General: Bowel sounds are normal  There is no distension  Palpations: Abdomen is soft  Tenderness: There is no abdominal tenderness  Lymphadenopathy:      Cervical: No cervical adenopathy  Neurological:      Mental Status: She is alert and oriented to person, place, and time  Cranial Nerves: No cranial nerve deficit  Sensory: No sensory deficit  Deep Tendon Reflexes: Reflexes are normal and symmetric  Psychiatric:         Attention and Perception: Attention and perception normal          Mood and Affect: Mood is anxious and depressed  Affect is not inappropriate  Speech: Speech normal          Behavior: Behavior normal          Thought Content:  Thought content normal          Cognition and Memory: Cognition normal          Judgment: Judgment normal

## 2021-08-11 NOTE — TELEPHONE ENCOUNTER
Patient was in the office yesterday, Medications went to [facility] patient needs it to go to [facility]   Medication refill requested: Xanax, Zoloft   Last office visit: 08/10/21  Next office visit:09/21/21  Last refilled: 08/10/21  [facility]

## 2021-08-12 ENCOUNTER — TELEPHONE (OUTPATIENT)
Dept: FAMILY MEDICINE CLINIC | Facility: CLINIC | Age: 56
End: 2021-08-12

## 2021-08-12 DIAGNOSIS — F41.1 GAD (GENERALIZED ANXIETY DISORDER): ICD-10-CM

## 2021-08-12 RX ORDER — SERTRALINE HYDROCHLORIDE 100 MG/1
TABLET, FILM COATED ORAL
Qty: 30 TABLET | Refills: 2 | Status: SHIPPED | OUTPATIENT
Start: 2021-08-12 | End: 2021-09-17

## 2021-08-12 RX ORDER — ALPRAZOLAM 0.5 MG/1
0.5 TABLET ORAL
Qty: 15 TABLET | Refills: 0 | Status: SHIPPED | OUTPATIENT
Start: 2021-08-12 | End: 2021-08-22 | Stop reason: SDUPTHER

## 2021-08-12 NOTE — TELEPHONE ENCOUNTER
Note     Medications need to go back to [facility] as to [facility] no having the medications on hand     Medication refill requested: Xanax, Zoloft   Last office visit: 08/10/21  Next office visit:09/21/21  Last refilled: 08/10/21  Pharmacy: [facility]

## 2021-08-12 NOTE — TELEPHONE ENCOUNTER
Patient is requesting that all prescriptions sent to [facility] yesterday be sent to her [facility] pharmacy on file as [facility] just told her they can't fill them and there are delays  She is out of her medications

## 2021-08-22 DIAGNOSIS — F41.1 GAD (GENERALIZED ANXIETY DISORDER): ICD-10-CM

## 2021-08-23 NOTE — TELEPHONE ENCOUNTER
Requested medication(s) are due for refill today: yes  Patient has already received a courtesy refill: No  Other reason request has been forwarded to provider: Medication refill requested:Xanax 0 5mg  Last office visit: 8/10  Next office visit: not scheduled  Last refilled: 8/12  Pharmacy:   [facility] [id number] [place] [address] [id number] [place] [address]  Phone: [phone] Fax: [phone]    Pended: 15/0

## 2021-08-24 RX ORDER — ALPRAZOLAM 0.5 MG/1
0.5 TABLET ORAL
Qty: 30 TABLET | Refills: 0 | Status: SHIPPED | OUTPATIENT
Start: 2021-08-24 | End: 2021-08-27

## 2021-08-27 DIAGNOSIS — F41.1 GAD (GENERALIZED ANXIETY DISORDER): ICD-10-CM

## 2021-08-27 RX ORDER — ALPRAZOLAM 0.5 MG/1
TABLET ORAL
Qty: 15 TABLET | Refills: 0 | Status: SHIPPED | OUTPATIENT
Start: 2021-08-27 | End: 2021-10-11

## 2021-08-27 NOTE — TELEPHONE ENCOUNTER
Pt is to be using xanax as needed sparingly  She just filled it 8/12/21  Will send in 15 tablets only  These need to last until her appointment with Dr Concetta Jiménez next month  No more refills until then

## 2021-09-11 ENCOUNTER — RA CDI HCC (OUTPATIENT)
Dept: OTHER | Facility: HOSPITAL | Age: 56
End: 2021-09-11

## 2021-09-11 NOTE — PROGRESS NOTES
[facility]  coding opportunities          Number of diagnosis code(s) already on the problem list added to FYI fla                     Patients insurance company: [employer] (Medicare Advantage and Commercial)     Visit status: Patient canceled the appointment        Dear Dr Marino Kwon,    Re: Modesta Mcfarlane    Based on clinical documentation indicated in the medical record, it appears that the patient may have the following condition:    I48 0 - Paroxysmal atrial fibrillation    This condition was last documented, assessed, and coded at the 2020 Cardiology office visit  If this is correct, please document, assess, and code at your next visit on 2021      Thank you,    Dyan Grigsby

## 2021-09-15 ENCOUNTER — TELEPHONE (OUTPATIENT)
Dept: FAMILY MEDICINE CLINIC | Facility: CLINIC | Age: 56
End: 2021-09-15

## 2021-09-17 DIAGNOSIS — F41.1 GAD (GENERALIZED ANXIETY DISORDER): ICD-10-CM

## 2021-09-17 RX ORDER — SERTRALINE HYDROCHLORIDE 100 MG/1
100 TABLET, FILM COATED ORAL DAILY
Qty: 30 TABLET | Refills: 3 | Status: SHIPPED | OUTPATIENT
Start: 2021-09-17 | End: 2022-02-04 | Stop reason: SDUPTHER

## 2021-10-09 DIAGNOSIS — F41.1 GAD (GENERALIZED ANXIETY DISORDER): ICD-10-CM

## 2021-10-11 RX ORDER — ALPRAZOLAM 0.5 MG/1
TABLET ORAL
Qty: 30 TABLET | Refills: 0 | Status: SHIPPED | OUTPATIENT
Start: 2021-10-11 | End: 2021-10-26 | Stop reason: SDUPTHER

## 2021-10-21 ENCOUNTER — TELEPHONE (OUTPATIENT)
Dept: FAMILY MEDICINE CLINIC | Facility: CLINIC | Age: 56
End: 2021-10-21

## 2021-10-26 ENCOUNTER — OFFICE VISIT (OUTPATIENT)
Dept: FAMILY MEDICINE CLINIC | Facility: CLINIC | Age: 56
End: 2021-10-26
Payer: COMMERCIAL

## 2021-10-26 VITALS
OXYGEN SATURATION: 99 % | HEART RATE: 50 BPM | SYSTOLIC BLOOD PRESSURE: 142 MMHG | HEIGHT: 64 IN | DIASTOLIC BLOOD PRESSURE: 82 MMHG | RESPIRATION RATE: 20 BRPM | TEMPERATURE: 97.8 F | BODY MASS INDEX: 39.48 KG/M2

## 2021-10-26 DIAGNOSIS — S80.12XD HEMATOMA OF LEFT LOWER EXTREMITY, SUBSEQUENT ENCOUNTER: Primary | ICD-10-CM

## 2021-10-26 DIAGNOSIS — D50.8 OTHER IRON DEFICIENCY ANEMIA: ICD-10-CM

## 2021-10-26 DIAGNOSIS — F41.1 GAD (GENERALIZED ANXIETY DISORDER): ICD-10-CM

## 2021-10-26 PROCEDURE — 1036F TOBACCO NON-USER: CPT | Performed by: FAMILY MEDICINE

## 2021-10-26 PROCEDURE — 99214 OFFICE O/P EST MOD 30 MIN: CPT | Performed by: FAMILY MEDICINE

## 2021-10-26 RX ORDER — ALPRAZOLAM 0.5 MG/1
TABLET ORAL
Qty: 30 TABLET | Refills: 0 | Status: SHIPPED | OUTPATIENT
Start: 2021-10-26 | End: 2021-11-08

## 2021-10-26 RX ORDER — OXYCODONE HYDROCHLORIDE 10 MG/1
10 TABLET ORAL EVERY 6 HOURS PRN
Qty: 30 TABLET | Refills: 0 | Status: SHIPPED | OUTPATIENT
Start: 2021-10-26 | End: 2022-04-13

## 2021-10-27 ENCOUNTER — HOSPITAL ENCOUNTER (OUTPATIENT)
Dept: NON INVASIVE DIAGNOSTICS | Facility: CLINIC | Age: 56
Discharge: HOME | End: 2021-10-27
Payer: COMMERCIAL

## 2021-10-27 DIAGNOSIS — S80.12XD HEMATOMA OF LEFT LOWER EXTREMITY, SUBSEQUENT ENCOUNTER: ICD-10-CM

## 2021-10-27 PROCEDURE — 93971 EXTREMITY STUDY: CPT

## 2021-10-27 PROCEDURE — 93971 EXTREMITY STUDY: CPT | Performed by: SURGERY

## 2021-11-01 DIAGNOSIS — F41.1 GAD (GENERALIZED ANXIETY DISORDER): ICD-10-CM

## 2021-11-08 RX ORDER — ALPRAZOLAM 0.5 MG/1
TABLET ORAL
Qty: 30 TABLET | Refills: 0 | Status: SHIPPED | OUTPATIENT
Start: 2021-11-08 | End: 2021-12-13 | Stop reason: SDUPTHER

## 2021-12-13 DIAGNOSIS — F41.1 GAD (GENERALIZED ANXIETY DISORDER): ICD-10-CM

## 2021-12-14 RX ORDER — ALPRAZOLAM 0.5 MG/1
TABLET ORAL
Qty: 30 TABLET | Refills: 0 | Status: SHIPPED | OUTPATIENT
Start: 2021-12-14 | End: 2022-01-14 | Stop reason: SDUPTHER

## 2022-01-09 DIAGNOSIS — I48.0 PAF (PAROXYSMAL ATRIAL FIBRILLATION) (HCC): ICD-10-CM

## 2022-01-09 RX ORDER — METOPROLOL SUCCINATE 25 MG/1
TABLET, EXTENDED RELEASE ORAL
Qty: 90 TABLET | Refills: 3 | Status: SHIPPED | OUTPATIENT
Start: 2022-01-09

## 2022-01-14 DIAGNOSIS — F41.1 GAD (GENERALIZED ANXIETY DISORDER): ICD-10-CM

## 2022-01-14 RX ORDER — ALPRAZOLAM 0.5 MG/1
TABLET ORAL
Qty: 30 TABLET | Refills: 0 | Status: SHIPPED | OUTPATIENT
Start: 2022-01-14 | End: 2022-02-14 | Stop reason: SDUPTHER

## 2022-01-14 NOTE — TELEPHONE ENCOUNTER
Medication refill requested: Sravan Salamanca  Last office visit: 10/26/21  Next office visit: NOT INDICATED   Last refilled: 12/14/21  Pharmacy:   [address] [place] [id number]  Phone: [phone] Fax: [phone]      Pended: 30 X 0

## 2022-02-04 DIAGNOSIS — F41.1 GAD (GENERALIZED ANXIETY DISORDER): ICD-10-CM

## 2022-02-04 RX ORDER — SERTRALINE HYDROCHLORIDE 100 MG/1
TABLET, FILM COATED ORAL
Qty: 30 TABLET | Refills: 3 | OUTPATIENT
Start: 2022-02-04

## 2022-02-14 DIAGNOSIS — F41.1 GAD (GENERALIZED ANXIETY DISORDER): ICD-10-CM

## 2022-02-14 RX ORDER — ALPRAZOLAM 0.5 MG/1
TABLET ORAL
Qty: 30 TABLET | Refills: 0 | Status: SHIPPED | OUTPATIENT
Start: 2022-02-14 | End: 2022-03-15 | Stop reason: SDUPTHER

## 2022-02-14 NOTE — TELEPHONE ENCOUNTER
Medication refill requested:  ALPRAZolam Nasra Tessie) 0 5 mg tablet   Last office visit: 10/26/2021  Next office visit: None   Last refilled: 01/14/2022  Labs: No  Ordering Provider: Jovani Ríos (select pharmacy send RX to):   [facility]

## 2022-03-15 DIAGNOSIS — F41.1 GAD (GENERALIZED ANXIETY DISORDER): ICD-10-CM

## 2022-03-15 RX ORDER — ALPRAZOLAM 0.5 MG/1
TABLET ORAL
Qty: 30 TABLET | Refills: 0 | Status: SHIPPED | OUTPATIENT
Start: 2022-03-15 | End: 2022-04-12 | Stop reason: SDUPTHER

## 2022-03-15 NOTE — TELEPHONE ENCOUNTER
Pt returned Yamile's call verifying she does need her Rx for Alprazolam filled    She uses the [facility] [address]

## 2022-03-15 NOTE — TELEPHONE ENCOUNTER
Medication refill requested: xanax   Last office visit:10/26/21  Next office visit: lm for mood following   Last refilled: 2/14/22  Labs: No  Ordering Provider: Jovani Ríos (select pharmacy send RX to):       [facility], [address] [place] [address]  Phone: [phone] Fax: [phone]    Pended 30 x 0

## 2022-03-28 ENCOUNTER — TELEPHONE (OUTPATIENT)
Dept: DERMATOLOGY | Facility: CLINIC | Age: 57
End: 2022-03-28

## 2022-03-28 ENCOUNTER — TELEPHONE (OUTPATIENT)
Dept: OTHER | Facility: OTHER | Age: 57
End: 2022-03-28

## 2022-03-28 NOTE — TELEPHONE ENCOUNTER
Pt called requesting a call back from office, at their best convenience, to schedule a new patient appointment

## 2022-03-29 NOTE — TELEPHONE ENCOUNTER
Patient called in wanting to schedule an appointment stating she is having trouble getting in touch with someone because she works until 4:30pm  Patient would prefer the earliest or latest appointment

## 2022-04-12 DIAGNOSIS — F41.1 GAD (GENERALIZED ANXIETY DISORDER): ICD-10-CM

## 2022-04-13 RX ORDER — ALPRAZOLAM 0.5 MG/1
TABLET ORAL
Qty: 30 TABLET | Refills: 0 | Status: SHIPPED | OUTPATIENT
Start: 2022-04-13 | End: 2022-05-12 | Stop reason: SDUPTHER

## 2022-04-13 NOTE — TELEPHONE ENCOUNTER
Medication refill requested: Xanax   Last office visit: 10/26/21  Next office visit: not indicated   Last refilled: 3/15/22  Labs: Yes, describe: 10/27/21  Ordering Provider: Jovani Ríos (select pharmacy send RX to):       [address] [facility], [address] [place] [id number]  Phone: [phone] Fax: [phone]

## 2022-05-12 DIAGNOSIS — F41.1 GAD (GENERALIZED ANXIETY DISORDER): ICD-10-CM

## 2022-05-13 NOTE — TELEPHONE ENCOUNTER
Patient Comment: Is it possible to increase how many I take to 2 a day  I need them during the day too  A lot of stress and anxiety at the moment   Thank you       Medication refill requested:   ALPRAZolam Layvonne Ala) 0 5 mg tablet   Last office visit: 10/26/2021  Next office visit: None   Last refilled: 4/13/2022  Labs: No  Ordering Provider: Jovani Ríso (select pharmacy send RX to):     [address] [facility], [address] [place], [place] [id number]  Phone: [phone] Fax: [phone]    Pending 30x0

## 2022-05-14 RX ORDER — ALPRAZOLAM 0.5 MG/1
TABLET ORAL
Qty: 60 TABLET | Refills: 0 | Status: SHIPPED | OUTPATIENT
Start: 2022-05-14 | End: 2022-06-13 | Stop reason: SDUPTHER

## 2022-05-14 NOTE — TELEPHONE ENCOUNTER
Let pt know we can increase just for now, but I think it would be a better idea to increase her zoloft to 150mg if her anxiety is worse since then we're getting at control rather than chasing it    Please let me know

## 2022-05-19 ENCOUNTER — TELEPHONE (OUTPATIENT)
Dept: FAMILY MEDICINE CLINIC | Facility: CLINIC | Age: 57
End: 2022-05-19

## 2022-05-19 ENCOUNTER — APPOINTMENT (EMERGENCY)
Dept: CT IMAGING | Facility: HOSPITAL | Age: 57
End: 2022-05-19
Payer: COMMERCIAL

## 2022-05-19 ENCOUNTER — HOSPITAL ENCOUNTER (EMERGENCY)
Facility: HOSPITAL | Age: 57
Discharge: HOME | End: 2022-05-19
Attending: EMERGENCY MEDICINE
Payer: COMMERCIAL

## 2022-05-19 VITALS
HEART RATE: 46 BPM | HEIGHT: 64 IN | OXYGEN SATURATION: 98 % | BODY MASS INDEX: 44.39 KG/M2 | DIASTOLIC BLOOD PRESSURE: 81 MMHG | SYSTOLIC BLOOD PRESSURE: 192 MMHG | WEIGHT: 260 LBS | RESPIRATION RATE: 18 BRPM | TEMPERATURE: 98.1 F

## 2022-05-19 DIAGNOSIS — G51.0 BELL'S PALSY: Primary | ICD-10-CM

## 2022-05-19 DIAGNOSIS — R03.0 ELEVATED BLOOD PRESSURE READING: ICD-10-CM

## 2022-05-19 LAB
ALBUMIN SERPL BCP-MCNC: 4.3 G/DL (ref 3.5–5)
ALP SERPL-CCNC: 68 U/L (ref 34–104)
ALT SERPL W P-5'-P-CCNC: 9 U/L (ref 7–52)
ANION GAP SERPL CALCULATED.3IONS-SCNC: 5 MMOL/L (ref 4–13)
AST SERPL W P-5'-P-CCNC: 21 U/L (ref 13–39)
BASOPHILS # BLD AUTO: 0.04 THOUSANDS/ΜL (ref 0–0.1)
BASOPHILS NFR BLD AUTO: 1 % (ref 0–1)
BILIRUB SERPL-MCNC: 0.37 MG/DL (ref 0.2–1)
BUN SERPL-MCNC: 13 MG/DL (ref 5–25)
CALCIUM SERPL-MCNC: 9.3 MG/DL (ref 8.4–10.2)
CHLORIDE SERPL-SCNC: 105 MMOL/L (ref 96–108)
CO2 SERPL-SCNC: 28 MMOL/L (ref 21–32)
CREAT SERPL-MCNC: 0.64 MG/DL (ref 0.6–1.3)
EOSINOPHIL # BLD AUTO: 0.12 THOUSAND/ΜL (ref 0–0.61)
EOSINOPHIL NFR BLD AUTO: 2 % (ref 0–6)
ERYTHROCYTE [DISTWIDTH] IN BLOOD BY AUTOMATED COUNT: 14.6 % (ref 11.6–15.1)
GFR SERPL CREATININE-BSD FRML MDRD: 98 ML/MIN/1.73SQ M
GLUCOSE SERPL-MCNC: 85 MG/DL (ref 65–140)
GLUCOSE SERPL-MCNC: 89 MG/DL (ref 65–140)
HCT VFR BLD AUTO: 39.5 % (ref 34.8–46.1)
HGB BLD-MCNC: 13 G/DL (ref 11.5–15.4)
IMM GRANULOCYTES # BLD AUTO: 0.02 THOUSAND/UL (ref 0–0.2)
IMM GRANULOCYTES NFR BLD AUTO: 0 % (ref 0–2)
LYMPHOCYTES # BLD AUTO: 2.19 THOUSANDS/ΜL (ref 0.6–4.47)
LYMPHOCYTES NFR BLD AUTO: 29 % (ref 14–44)
MCH RBC QN AUTO: 33.4 PG (ref 26.8–34.3)
MCHC RBC AUTO-ENTMCNC: 32.9 G/DL (ref 31.4–37.4)
MCV RBC AUTO: 102 FL (ref 82–98)
MONOCYTES # BLD AUTO: 0.55 THOUSAND/ΜL (ref 0.17–1.22)
MONOCYTES NFR BLD AUTO: 7 % (ref 4–12)
NEUTROPHILS # BLD AUTO: 4.7 THOUSANDS/ΜL (ref 1.85–7.62)
NEUTS SEG NFR BLD AUTO: 61 % (ref 43–75)
NRBC BLD AUTO-RTO: 0 /100 WBCS
PLATELET # BLD AUTO: 469 THOUSANDS/UL (ref 149–390)
PMV BLD AUTO: 10.5 FL (ref 8.9–12.7)
POTASSIUM SERPL-SCNC: 4.7 MMOL/L (ref 3.5–5.3)
PROT SERPL-MCNC: 7.7 G/DL (ref 6.4–8.4)
RBC # BLD AUTO: 3.89 MILLION/UL (ref 3.81–5.12)
SODIUM SERPL-SCNC: 138 MMOL/L (ref 135–147)
WBC # BLD AUTO: 7.62 THOUSAND/UL (ref 4.31–10.16)

## 2022-05-19 PROCEDURE — 99284 EMERGENCY DEPT VISIT MOD MDM: CPT | Performed by: PHYSICIAN ASSISTANT

## 2022-05-19 PROCEDURE — 80053 COMPREHEN METABOLIC PANEL: CPT | Performed by: PHYSICIAN ASSISTANT

## 2022-05-19 PROCEDURE — 85025 COMPLETE CBC W/AUTO DIFF WBC: CPT | Performed by: PHYSICIAN ASSISTANT

## 2022-05-19 PROCEDURE — 99284 EMERGENCY DEPT VISIT MOD MDM: CPT

## 2022-05-19 PROCEDURE — 93005 ELECTROCARDIOGRAM TRACING: CPT

## 2022-05-19 PROCEDURE — 82948 REAGENT STRIP/BLOOD GLUCOSE: CPT

## 2022-05-19 PROCEDURE — G1004 CDSM NDSC: HCPCS

## 2022-05-19 PROCEDURE — 36415 COLL VENOUS BLD VENIPUNCTURE: CPT | Performed by: PHYSICIAN ASSISTANT

## 2022-05-19 PROCEDURE — 70450 CT HEAD/BRAIN W/O DYE: CPT

## 2022-05-19 PROCEDURE — 86618 LYME DISEASE ANTIBODY: CPT | Performed by: PHYSICIAN ASSISTANT

## 2022-05-19 RX ORDER — VALACYCLOVIR HYDROCHLORIDE 1 G/1
1000 TABLET, FILM COATED ORAL 3 TIMES DAILY
Qty: 21 TABLET | Refills: 0 | Status: SHIPPED | OUTPATIENT
Start: 2022-05-19 | End: 2022-06-07

## 2022-05-19 RX ORDER — PREDNISONE 20 MG/1
60 TABLET ORAL DAILY
Qty: 21 TABLET | Refills: 0 | Status: SHIPPED | OUTPATIENT
Start: 2022-05-19 | End: 2022-05-26

## 2022-05-19 NOTE — ED NOTES
Pt ambulated to the bathroom safely and independently with a steady gait        Cherelle Smith, JUN  05/19/22 1926

## 2022-05-19 NOTE — ED PROVIDER NOTES
History  Chief Complaint   Patient presents with    Facial Droop     R sided facial droop noticed around 630pm last night, state eye feels like a boulder      The patient is a 64-year-old female with history of hypertension, hyperlipidemia and AFib who presents to the emergency department for evaluation of right-sided facial droop  The patient states that she 1st noticed the facial droop around 6:30 p m  Yesterday  She states it may have started the been earlier, but she was wearing a mask all day at work  She also reports significant dryness and irritation to her right eye  She denies any other associated symptoms  She does not have any prior history of Bell's palsy  She also denies prior history of stroke  The patient adamantly denies any other associated symptoms at this time including headache, slurred speech or weakness  History provided by:  Patient   used: No        Prior to Admission Medications   Prescriptions Last Dose Informant Patient Reported? Taking?    ALPRAZolam (XANAX) 0 5 mg tablet   No No   Sig: TAKE ONE TABLET BY MOUTH twice daily as needed for anxiety   flecainide (TAMBOCOR) 50 mg tablet   No No   Sig: Take 1 tablet (50 mg total) by mouth 2 (two) times a day   metoprolol succinate (TOPROL-XL) 25 mg 24 hr tablet   No No   Sig: TAKE 1 TABLET BY MOUTH DAILY   rivaroxaban (XARELTO) 20 mg tablet   No No   Sig: Take 1 tablet (20 mg total) by mouth daily   sertraline (ZOLOFT) 100 mg tablet   No No   Sig: Take 1 tablet (100 mg total) by mouth daily      Facility-Administered Medications: None       Past Medical History:   Diagnosis Date    A-fib ([facility] )     Anxiety     Arthritis     Knees and back     Depression     Fall     Hiatal hernia     Hyperlipidemia     Hypertension     Morbid obesity ([facility] )     Pneumonia        Past Surgical History:   Procedure Laterality Date    BACK SURGERY  1998    ESOPHAGOGASTRODUODENOSCOPY  10/17/2016    HIATAL HERNIA REPAIR 10/17/2016    T [address] SURGERY  05/1998    Approx    PARTIAL GASTRECTOMY  10/17/2016    T Mary Annehayden Odellin DO       VAGINAL DELIVERY      x1       Family History   Problem Relation Age of Onset    Arthritis Mother     COPD Mother     Diabetes Mother     Obesity Mother     Coronary artery disease Father     Diabetes Father     Heart attack Father     Heart disease Father     Obesity Father     Alzheimer's disease Paternal Grandfather     Dementia Paternal Grandfather      I have reviewed and agree with the history as documented  E-Cigarette/Vaping    E-Cigarette Use Never User      E-Cigarette/Vaping Substances     Social History     Tobacco Use    Smoking status: Never Smoker    Smokeless tobacco: Never Used   Vaping Use    Vaping Use: Never used   Substance Use Topics    Alcohol use: Yes     Comment: VERY LITTLE USE     Drug use: Never       Review of Systems   Constitutional: Negative for chills and fever  HENT: Negative for ear pain and sore throat  Eyes: Negative for redness and visual disturbance  Respiratory: Negative for cough and shortness of breath  Cardiovascular: Negative for chest pain  Gastrointestinal: Negative for abdominal pain, diarrhea, nausea and vomiting  Genitourinary: Negative for dysuria and hematuria  Musculoskeletal: Negative for back pain, neck pain and neck stiffness  Skin: Negative for color change and rash  Neurological: Positive for facial asymmetry  Negative for dizziness, light-headedness and headaches  All other systems reviewed and are negative  Physical Exam  Physical Exam  Vitals and nursing note reviewed  Constitutional:       General: She is not in acute distress  Appearance: She is well-developed  She is not ill-appearing or toxic-appearing  HENT:      Head: Normocephalic and atraumatic  Mouth/Throat:      Pharynx: Uvula midline     Eyes:      General: Lids are normal  Conjunctiva/sclera: Conjunctivae normal    Cardiovascular:      Rate and Rhythm: Normal rate and regular rhythm  Heart sounds: Normal heart sounds  Pulmonary:      Effort: Pulmonary effort is normal       Breath sounds: Normal breath sounds  Abdominal:      General: There is no distension  Palpations: Abdomen is soft  Tenderness: There is no abdominal tenderness  Musculoskeletal:      Cervical back: Normal range of motion and neck supple  Skin:     General: Skin is warm and dry  Neurological:      Mental Status: She is alert and oriented to person, place, and time  Cranial Nerves: Facial asymmetry present  Sensory: Sensation is intact  Motor: Motor function is intact  Coordination: Coordination is intact  Gait: Gait is intact  Comments: Right-sided facial droop involving the forehead           Vital Signs  ED Triage Vitals   Temperature Pulse Respirations Blood Pressure SpO2   05/19/22 1204 05/19/22 1202 05/19/22 1202 05/19/22 1202 05/19/22 1202   98 1 °F (36 7 °C) 58 18 (!) 208/86 98 %      Temp Source Heart Rate Source Patient Position - Orthostatic VS BP Location FiO2 (%)   05/19/22 1204 05/19/22 1202 05/19/22 1202 05/19/22 1202 --   Oral Monitor Lying Right arm       Pain Score       05/19/22 1202       4           Vitals:    05/19/22 1202 05/19/22 1239 05/19/22 1342   BP: (!) 208/86 (!) 199/81 (!) 192/81   Pulse: 58 (!) 46    Patient Position - Orthostatic VS: Lying Lying Lying         Visual Acuity      ED Medications  Medications - No data to display    Diagnostic Studies  Results Reviewed     Procedure Component Value Units Date/Time    Lyme Antibody Profile with reflex to WB [[id number]]  (Normal) Collected: 05/19/22 1235    Lab Status: Final result Specimen: Blood from Arm, Right Updated: 05/20/22 0517     Lyme total antibody 0    Comprehensive metabolic panel [[id number]] Collected: 05/19/22 1235    Lab Status: Final result Specimen: Blood from Arm, Right Updated: 05/19/22 1312     Sodium 138 mmol/L      Potassium 4 7 mmol/L      Chloride 105 mmol/L      CO2 28 mmol/L      ANION GAP 5 mmol/L      BUN 13 mg/dL      Creatinine 0 64 mg/dL      Glucose 89 mg/dL      Calcium 9 3 mg/dL      AST 21 U/L      ALT 9 U/L      Alkaline Phosphatase 68 U/L      Total Protein 7 7 g/dL      Albumin 4 3 g/dL      Total Bilirubin 0 37 mg/dL      eGFR 98 ml/min/1 73sq m     Narrative:      National Kidney Disease Foundation guidelines for Chronic Kidney Disease (CKD):     Stage 1 with normal or high GFR (GFR > 90 mL/min/1 73 square meters)    Stage 2 Mild CKD (GFR = 60-89 mL/min/1 73 square meters)    Stage 3A Moderate CKD (GFR = 45-59 mL/min/1 73 square meters)    Stage 3B Moderate CKD (GFR = 30-44 mL/min/1 73 square meters)    Stage 4 Severe CKD (GFR = 15-29 mL/min/1 73 square meters)    Stage 5 End Stage CKD (GFR <15 mL/min/1 73 square meters)  Note: GFR calculation is accurate only with a steady state creatinine    CBC and differential [[id number]]  (Abnormal) Collected: 05/19/22 1235    Lab Status: Final result Specimen: Blood from Arm, Right Updated: 05/19/22 1253     WBC 7 62 Thousand/uL      RBC 3 89 Million/uL      Hemoglobin 13 0 g/dL      Hematocrit 39 5 %       fL      MCH 33 4 pg      MCHC 32 9 g/dL      RDW 14 6 %      MPV 10 5 fL      Platelets 213 Thousands/uL      nRBC 0 /100 WBCs      Neutrophils Relative 61 %      Immat GRANS % 0 %      Lymphocytes Relative 29 %      Monocytes Relative 7 %      Eosinophils Relative 2 %      Basophils Relative 1 %      Neutrophils Absolute 4 70 Thousands/µL      Immature Grans Absolute 0 02 Thousand/uL      Lymphocytes Absolute 2 19 Thousands/µL      Monocytes Absolute 0 55 Thousand/µL      Eosinophils Absolute 0 12 Thousand/µL      Basophils Absolute 0 04 Thousands/µL     Fingerstick Glucose (POCT) [[id number]]  (Normal) Collected: 05/19/22 1212    Lab Status: Final result Updated: 05/19/22 1213     POC Glucose 85 mg/dl                  CT head without contrast   Final Result by Frederick Unger MD (05/19 7626)      No acute intracranial abnormality  Workstation performed: [id number]                    Procedures  Procedures         ED Course                                             MDM  Number of Diagnoses or Management Options  Bell's palsy: new and requires workup  Elevated blood pressure reading: new and requires workup  Diagnosis management comments: Patient presents for evaluation of right-sided facial droop that she noticed around 6 p m  yesterday  On my exam, the facial asymmetry very clearly involves the forehead  The patient is unable to lift her right eyebrow  Physical exam findings are most consistent with Bell's palsy at this time  However, the patient is also noted to be pretty significantly hypertensive in the emergency department today  She states that she has had high blood pressure in the past, but she is not on any medications currently for this  Due to the elevated blood pressure and her history of AFib, the decision was made to do a little further workup at this time  I discussed this with the patient, and she is agreeable  Labs and imaging were ordered  Labs reviewed and notable for thrombocytosis but no other significant findings  CT of head came back within normal limits  Patient's neurologic exam is completely within normal limits other than the facial droop  Will treat as Bell's palsy at this time  Patient will be prescribed a course of prednisone and antivirals as symptoms just began yesterday  Patient was instructed of need for close follow-up with her doctor, particularly in light of her elevated blood pressure readings in the emergency department today  She acknowledged understanding of this      We held extensive discussion regarding strict return to ED precautions, particularly if she develops any other symptoms such as weakness, numbness, slurred speech or headache  Regarding the patient's eye symptoms, I advised that she use artificial tears to help with the dryness  I advised that she be re-evaluated if her eye continues to be feel persistently irritated  Patient is stable for discharge  Amount and/or Complexity of Data Reviewed  Clinical lab tests: ordered and reviewed  Tests in the radiology section of CPT®: ordered and reviewed  Decide to obtain previous medical records or to obtain history from someone other than the patient: yes  Review and summarize past medical records: yes  Discuss the patient with other providers: yes (Dr Gil Nielsen)    Risk of Complications, Morbidity, and/or Mortality  Presenting problems: low  Diagnostic procedures: low  Management options: low    Patient Progress  Patient progress: stable      Disposition  Final diagnoses:   Bell's palsy   Elevated blood pressure reading     Time reflects when diagnosis was documented in both MDM as applicable and the Disposition within this note     Time User Action Codes Description Comment    5/19/2022  1:43 PM Nonda Sports, Lesia Add [G51 0] Bell's palsy     5/19/2022  1:44 PM Nonda Sports, Lesai Add [R03 0] Elevated blood pressure reading       ED Disposition     ED Disposition   Discharge    Condition   Stable    Date/Time   Thu May 19, 2022  1:43 PM    Comment   Orville Holguin discharge to home/self care  Follow-up Information     Follow up With Specialties Details Why Contact Info Additional Hansa Berger 46, DO Family Medicine Schedule an appointment as soon as possible for a visit in 3 days  Gabriela Zaman [address]    [address] [facility] Emergency Department Emergency Medicine  If symptoms worsen [address] Emergency Department, [address], [place], [place], [address]          Discharge Medication List as of 5/19/2022  1:46 PM      START taking these medications    Details   predniSONE 20 mg tablet Take 3 tablets (60 mg total) by mouth in the morning for 7 days  , Starting Thu 5/19/2022, Until u 5/26/2022, Normal      valACYclovir (VALTREX) 1,000 mg tablet Take 1 tablet (1,000 mg total) by mouth in the morning and 1 tablet (1,000 mg total) in the evening and 1 tablet (1,000 mg total) before bedtime  Do all this for 7 days  , Starting Thu 5/19/2022, Until Thu 5/26/2022, Normal         CONTINUE these medications which have NOT CHANGED    Details   ALPRAZolam (XANAX) 0 5 mg tablet TAKE ONE TABLET BY MOUTH twice daily as needed for anxiety, Normal      flecainide (TAMBOCOR) 50 mg tablet Take 1 tablet (50 mg total) by mouth 2 (two) times a day, Starting Wed 7/21/2021, Normal      metoprolol succinate (TOPROL-XL) 25 mg 24 hr tablet TAKE 1 TABLET BY MOUTH DAILY, Normal      rivaroxaban (XARELTO) 20 mg tablet Take 1 tablet (20 mg total) by mouth daily, Starting Wed 7/21/2021, Normal      sertraline (ZOLOFT) 100 mg tablet Take 1 tablet (100 mg total) by mouth daily, Starting Fri 2/4/2022, Normal             No discharge procedures on file      PDMP Review       Value Time User    PDMP Reviewed  Yes 5/14/2022 12:12 PM David Felix DO          ED Provider  Electronically Signed by           Maddy Edwards PA-C  05/20/22 8171

## 2022-05-19 NOTE — TELEPHONE ENCOUNTER
Placed call to patient as she was scheduled as an add on for possible [place] Palsy  Patient does not have a history of [place] Palsy or h/o stroke  Spoke to patient, per patient she had no issues until last night  Patient was at her sons house for dinner and he noticed patients right sided facial droop  Per patient she needs a straw to drink or she cannot keep the liquid in her mouth due to the droop  Per patient today co workers have noticed a slight slurred speech but she has not  Patient also c/o scratchy watery eyes since yesterday afternoon  No new meds or dental procedures  Xanax increased BID in the beginning of the week  Denies right sided weakness, blurred vision, HA, lightheadedness, CP, SOB  Denies palpitations  Currently at work  Spoke to Dr Eloina Lea, patient needs to go to ED for evaluation  Patient made aware and [facility]

## 2022-05-20 LAB
ATRIAL RATE: 48 BPM
B BURGDOR IGG+IGM SER-ACNC: 0
P AXIS: 49 DEGREES
PR INTERVAL: 152 MS
QRS AXIS: 23 DEGREES
QRSD INTERVAL: 80 MS
QT INTERVAL: 486 MS
QTC INTERVAL: 426 MS
T WAVE AXIS: 41 DEGREES
VENTRICULAR RATE: 46 BPM

## 2022-05-20 PROCEDURE — 93010 ELECTROCARDIOGRAM REPORT: CPT | Performed by: INTERNAL MEDICINE

## 2022-06-04 DIAGNOSIS — F41.1 GAD (GENERALIZED ANXIETY DISORDER): ICD-10-CM

## 2022-06-07 ENCOUNTER — OFFICE VISIT (OUTPATIENT)
Dept: FAMILY MEDICINE CLINIC | Facility: CLINIC | Age: 57
End: 2022-06-07
Payer: COMMERCIAL

## 2022-06-07 VITALS
BODY MASS INDEX: 44.63 KG/M2 | HEIGHT: 64 IN | OXYGEN SATURATION: 99 % | DIASTOLIC BLOOD PRESSURE: 84 MMHG | SYSTOLIC BLOOD PRESSURE: 140 MMHG | RESPIRATION RATE: 18 BRPM | TEMPERATURE: 97.5 F | HEART RATE: 80 BPM

## 2022-06-07 DIAGNOSIS — F33.9 DEPRESSION, RECURRENT (HCC): ICD-10-CM

## 2022-06-07 DIAGNOSIS — I48.0 PAF (PAROXYSMAL ATRIAL FIBRILLATION) (HCC): ICD-10-CM

## 2022-06-07 DIAGNOSIS — E78.2 MIXED HYPERLIPIDEMIA: ICD-10-CM

## 2022-06-07 DIAGNOSIS — M25.511 CHRONIC RIGHT SHOULDER PAIN: ICD-10-CM

## 2022-06-07 DIAGNOSIS — Z98.84 HISTORY OF BARIATRIC SURGERY: ICD-10-CM

## 2022-06-07 DIAGNOSIS — G89.29 CHRONIC RIGHT SHOULDER PAIN: ICD-10-CM

## 2022-06-07 DIAGNOSIS — D68.9 COAGULOPATHY (HCC): ICD-10-CM

## 2022-06-07 DIAGNOSIS — I10 ESSENTIAL HYPERTENSION: Primary | ICD-10-CM

## 2022-06-07 PROCEDURE — 99214 OFFICE O/P EST MOD 30 MIN: CPT | Performed by: FAMILY MEDICINE

## 2022-06-07 PROCEDURE — 1036F TOBACCO NON-USER: CPT | Performed by: FAMILY MEDICINE

## 2022-06-07 RX ORDER — METHYLPREDNISOLONE 4 MG/1
TABLET ORAL
Qty: 21 EACH | Refills: 0 | Status: SHIPPED | OUTPATIENT
Start: 2022-06-07

## 2022-06-07 RX ORDER — LISINOPRIL 10 MG/1
10 TABLET ORAL DAILY
Qty: 30 TABLET | Refills: 3 | Status: SHIPPED | OUTPATIENT
Start: 2022-06-07 | End: 2022-07-06 | Stop reason: SDUPTHER

## 2022-06-07 RX ORDER — SERTRALINE HYDROCHLORIDE 100 MG/1
TABLET, FILM COATED ORAL
Qty: 30 TABLET | Refills: 3 | OUTPATIENT
Start: 2022-06-07

## 2022-06-07 NOTE — PROGRESS NOTES
Assessment/Plan:    No problem-specific Assessment & Plan notes found for this encounter  Diagnoses and all orders for this visit:    Essential hypertension  Comments:  start lisinopril 10mg daily  recheck 1 mo  Orders:  -     lisinopril (ZESTRIL) 10 mg tablet; Take 1 tablet (10 mg total) by mouth daily    Coagulopathy (HCC)  Comments:  continue xarelto for afib    Depression, recurrent (HCC)  Comments:  continue zoloft    PAF (paroxysmal atrial fibrillation) ([facility] )  Comments:  controlled   sinus today  continue current meds and cardiology f/u    Mixed hyperlipidemia  -     Lipid panel; Future    Chronic right shoulder pain  Comments:  medrol dose raymond  refer to ortho ([facility] at [facility])  Orders:  -     methylPREDNISolone 4 MG tablet therapy pack; Use as directed on package    History of bariatric surgery  -     Vitamin D 25 hydroxy; Future  -     Vitamin B12; Future  -     Folate; Future  -     T4, free; Future  -     TSH, 3rd generation; Future  -     Ferritin; Future        Subjective:      Patient ID: Alexander Farris is a 60 y o  female  HPI  Pt has been having trouble with R shoulder pain x ?1 yr   Getting worse all the time and can now be 8/10  Has ROM and strength, but pain is severe  Worse with mvmt  Did some PT at [facility] When she worked there  Took tramadol at the time which helped  Tylenol not helping  Recently treated at the ED for bell's palsy  Improving  Negative lyme titers  nml cbc/cmp    Blood pressure has been running high  200 in the ED  On flecainide and toprol for afib  On xarelto  Due for labs    The following portions of the patient's history were reviewed and updated as appropriate: allergies, current medications, past family history, past medical history, past social history, past surgical history and problem list     Review of Systems   Constitutional: Negative for chills, fatigue, fever and unexpected weight change     HENT: Negative for congestion, ear pain, hearing loss, postnasal drip, rhinorrhea, sinus pressure, sinus pain, sore throat, trouble swallowing and voice change  Eyes: Negative for pain, redness and visual disturbance  Respiratory: Negative for cough and shortness of breath  Cardiovascular: Negative for chest pain, palpitations and leg swelling  Gastrointestinal: Negative for abdominal pain, constipation, diarrhea and nausea  Endocrine: Negative for cold intolerance, heat intolerance, polydipsia and polyuria  Genitourinary: Negative for dysuria, frequency and urgency  Musculoskeletal: Positive for arthralgias  Negative for joint swelling, myalgias and neck pain  Skin: Negative for rash  No suspicious lesions   Neurological: Positive for headaches  Negative for weakness and numbness  Hematological: Negative for adenopathy  Objective:      /84   Pulse 80   Temp 97 5 °F (36 4 °C)   Resp 18   Ht 5' 4" (1 626 m)   SpO2 99%   BMI 44 63 kg/m²          Physical Exam  Constitutional:       General: She is not in acute distress  Appearance: She is well-developed  HENT:      Head: Normocephalic and atraumatic  Right Ear: Tympanic membrane, ear canal and external ear normal       Left Ear: Tympanic membrane, ear canal and external ear normal       Nose: Nose normal       Mouth/Throat:      Pharynx: No oropharyngeal exudate  Eyes:      Conjunctiva/sclera: Conjunctivae normal       Pupils: Pupils are equal, round, and reactive to light  Neck:      Thyroid: No thyromegaly  Vascular: No carotid bruit or JVD  Cardiovascular:      Rate and Rhythm: Regular rhythm  Heart sounds: S1 normal and S2 normal  No murmur heard  No friction rub  No gallop  No S3 or S4 sounds  Pulmonary:      Effort: Pulmonary effort is normal       Breath sounds: Normal breath sounds  No wheezing, rhonchi or rales  Abdominal:      General: Bowel sounds are normal  There is no distension  Palpations: Abdomen is soft  Tenderness: There is no abdominal tenderness  Lymphadenopathy:      Cervical: No cervical adenopathy  Neurological:      Mental Status: She is alert and oriented to person, place, and time  Cranial Nerves: Cranial nerve deficit present  Sensory: No sensory deficit  Deep Tendon Reflexes: Reflexes are normal and symmetric  Comments: Mild R sided facial droop         BMI Counseling: Body mass index is 44 63 kg/m²  The BMI is above normal  Nutrition recommendations include encouraging healthy choices of fruits and vegetables  Exercise recommendations include exercising 3-5 times per week  Rationale for BMI follow-up plan is due to patient being overweight or obese

## 2022-06-07 NOTE — TELEPHONE ENCOUNTER
Medication refill requested: zoloft  Last office visit: 10/26/21  Next office visit: 06/07/22, today  Last refilled: 2/4/22 #30x3  Labs: No  Ordering Provider: Jovani Ríos (select pharmacy send RX to):   [address] [facility], [address] [place] [address]  Phone: [phone] Fax: [phone]      [employer] message sent to patient

## 2022-06-13 DIAGNOSIS — F41.1 GAD (GENERALIZED ANXIETY DISORDER): ICD-10-CM

## 2022-06-13 RX ORDER — ALPRAZOLAM 0.5 MG/1
TABLET ORAL
Qty: 60 TABLET | Refills: 0 | Status: SHIPPED | OUTPATIENT
Start: 2022-06-13 | End: 2022-07-19 | Stop reason: SDUPTHER

## 2022-06-13 NOTE — TELEPHONE ENCOUNTER
Medication refill requested:  ALPRAZolam Eliza Nova) 0 5 mg tablet     Last office visit: 06/07/2022  Next office visit: None   Last refilled:05/14/2022  Labs: No  Ordering Provider: Jovani Ríos (select pharmacy send RX to):     [address], [address] [place] [address]  Phone: [phone] Fax: [phone]

## 2022-07-05 ENCOUNTER — RA CDI HCC (OUTPATIENT)
Dept: OTHER | Facility: HOSPITAL | Age: 57
End: 2022-07-05

## 2022-07-05 DIAGNOSIS — I48.0 PAF (PAROXYSMAL ATRIAL FIBRILLATION) (HCC): ICD-10-CM

## 2022-07-06 RX ORDER — RIVAROXABAN 20 MG/1
TABLET, FILM COATED ORAL
Qty: 90 TABLET | Refills: 3 | Status: SHIPPED | OUTPATIENT
Start: 2022-07-06

## 2022-07-06 RX ORDER — FLECAINIDE ACETATE 50 MG/1
TABLET ORAL
Qty: 180 TABLET | Refills: 3 | Status: SHIPPED | OUTPATIENT
Start: 2022-07-06

## 2022-07-08 PROBLEM — E66.01 MORBID (SEVERE) OBESITY DUE TO EXCESS CALORIES (HCC): Status: ACTIVE | Noted: 2022-07-08

## 2022-07-19 DIAGNOSIS — F41.1 GAD (GENERALIZED ANXIETY DISORDER): ICD-10-CM

## 2022-07-20 RX ORDER — ALPRAZOLAM 0.5 MG/1
TABLET ORAL
Qty: 60 TABLET | Refills: 0 | Status: SHIPPED | OUTPATIENT
Start: 2022-07-20 | End: 2022-08-18 | Stop reason: SDUPTHER

## 2022-07-20 NOTE — TELEPHONE ENCOUNTER
Medication refill requested: ALPRAZolam Rosalinefiliberto Webster) 0 5 mg tablet  Last office visit: 06/07/2022  Next office visit: None   Last refilled:06/13/2022  Labs: No  Ordering Provider: Jovani Ríos (select pharmacy send RX to):     [address] [facility], [address] [place] [address]  Phone: [phone] Fax: [phone]

## 2022-08-18 DIAGNOSIS — F41.1 GAD (GENERALIZED ANXIETY DISORDER): ICD-10-CM

## 2022-08-18 NOTE — TELEPHONE ENCOUNTER
Medication refill requested:ALPRAZolam      Last office visit: 06/07/2022  Next office visit:   Last refilled: 7/20/22  Labs: No  Ordering Provider: Jovani Ríos (select pharmacy send RX to):     [address] [facility], [address] [place] [address]  Phone: [phone] Fax: [phone]

## 2022-08-19 RX ORDER — ALPRAZOLAM 0.5 MG/1
TABLET ORAL
Qty: 60 TABLET | Refills: 0 | Status: SHIPPED | OUTPATIENT
Start: 2022-08-19 | End: 2022-09-19 | Stop reason: SDUPTHER

## 2022-09-19 DIAGNOSIS — F41.1 GAD (GENERALIZED ANXIETY DISORDER): ICD-10-CM

## 2022-09-19 RX ORDER — ALPRAZOLAM 0.5 MG/1
TABLET ORAL
Qty: 60 TABLET | Refills: 0 | Status: SHIPPED | OUTPATIENT
Start: 2022-09-19 | End: 2022-10-17 | Stop reason: SDUPTHER

## 2022-10-17 DIAGNOSIS — F41.1 GAD (GENERALIZED ANXIETY DISORDER): ICD-10-CM

## 2022-10-17 RX ORDER — ALPRAZOLAM 0.5 MG/1
TABLET ORAL
Qty: 60 TABLET | Refills: 0 | Status: SHIPPED | OUTPATIENT
Start: 2022-10-17

## 2022-11-17 DIAGNOSIS — F41.1 GAD (GENERALIZED ANXIETY DISORDER): ICD-10-CM

## 2022-11-18 RX ORDER — ALPRAZOLAM 0.5 MG/1
TABLET ORAL
Qty: 60 TABLET | Refills: 0 | Status: SHIPPED | OUTPATIENT
Start: 2022-11-18

## 2022-12-09 ENCOUNTER — TELEMEDICINE (OUTPATIENT)
Dept: FAMILY MEDICINE CLINIC | Facility: CLINIC | Age: 57
End: 2022-12-09

## 2022-12-09 VITALS — WEIGHT: 260 LBS | TEMPERATURE: 97.4 F | BODY MASS INDEX: 44.63 KG/M2

## 2022-12-09 DIAGNOSIS — E78.2 MIXED HYPERLIPIDEMIA: ICD-10-CM

## 2022-12-09 DIAGNOSIS — U07.1 COVID-19: Primary | ICD-10-CM

## 2022-12-09 DIAGNOSIS — I48.0 PAF (PAROXYSMAL ATRIAL FIBRILLATION) (HCC): ICD-10-CM

## 2022-12-09 DIAGNOSIS — I10 ESSENTIAL HYPERTENSION: ICD-10-CM

## 2022-12-09 DIAGNOSIS — E66.01 MORBID OBESITY WITH BMI OF 40.0-44.9, ADULT (HCC): ICD-10-CM

## 2022-12-09 DIAGNOSIS — E66.01 MORBID (SEVERE) OBESITY DUE TO EXCESS CALORIES (HCC): ICD-10-CM

## 2022-12-09 RX ORDER — GABAPENTIN 800 MG/1
800 TABLET ORAL 3 TIMES DAILY
COMMUNITY

## 2022-12-09 NOTE — PROGRESS NOTES
PHQ-2/9 Depression Screening    Little interest or pleasure in doing things: 0 - not at all  Feeling down, depressed, or hopeless: 1 - several days  Trouble falling or staying asleep, or sleeping too much: 1 - several days  Feeling tired or having little energy: 1 - several days  Poor appetite or overeating: 3 - nearly every day  Feeling bad about yourself - or that you are a failure or have let yourself or your family down: 1 - several days  Trouble concentrating on things, such as reading the newspaper or watching television: 1 - several days  Moving or speaking so slowly that other people could have noticed  Or the opposite - being so fidgety or restless that you have been moving around a lot more than usual: 0 - not at all  Thoughts that you would be better off dead, or of hurting yourself in some way: 0 - not at all             COVID-19 Outpatient Progress Note    Assessment/Plan:    Problem List Items Addressed This Visit        Cardiovascular and Mediastinum    Essential hypertension    PAF ([facility]oxysmal [facility] fibrillation) [facility]       Other    Mixed hyperlipidemia    Morbid (severe) obesity due to excess calories ([facility] )   Other Visit Diagnoses     COVID-19    -  Primary    Relevant Medications    molnupiravir 200 mg capsule    Morbid obesity with BMI of 40 0-44 9, adult ([facility] )             Disposition:     Risks and benefits of COVID-19 vaccination was discussed with patient  Patient has asymptomatic or mild COVID-19 infection  Based off CDC guidelines, they were recommended to isolate for 5 days  If they are asymptomatic or symptoms are improving with no fevers in the past 24 hours, isolation may be ended followed by 5 days of wearing a mask when around othes to minimize risk of infecting others  If still have a fever or other symptoms have not improved, continue to isolate until they improve   Regardless of when they end isolation, avoid being around people who are more likely to get very sick from COVID-19 until at least day 11  Discussed symptom directed medication options with patient  Patient should complete 5 days of isolation through Sunday, 12/11/2022, at the earliest ending on Monday, 12/12/2022  She would need to mask for additional 5 days  Patient does not qualify for Paxlovid due to her current medications (Xarelto)  She was counseled regarding Molnupiravir and medicine was sent to the pharmacy  Advise Maryan saenz sinus rinse kit, Mucinex, Claritin/Zyrtec/Allegra, Flonase / Nasacort  Avoid decongestants if you have high blood pressure  You may use Tylenol (Acetaminophen) up to 3,000mg daily (in 24 hours) as needed for pain or fever  Patient meets criteria for Molnupiravir and they have been counseled according to EUA documentation provided by the FDA  After discussion, patient agrees to treatment  Daryle Nova is an investigational medicine used to treat mild-to-moderate COVID-19 in adults with positive results of direct SARS-CoV-2 viral testing, and who are at high risk for progressing to severe COVID-19 including hospitalization or death, and for whom other COVID-19 treatment options authorized by the FDA are not accessible or clinically appropriate  The FDA has authorized the emergency use of molnupiravir for the treatment of mild-tomoderate COVID-19 in adults under an EUA  Daryle Nova is not authorized:  - for use in people less than 25years of age   - for prevention of COVID-19   - for people needing hospitalization for COVID-19   - for use for longer than 5 consecutive days    What is the most important information I should know about molnupiravir? Daryle Nova may cause serious side effects, including:    Daryle Nova may cause harm to your unborn baby  It is not known if molnupiravir will harm your baby if you take molnupiravir during pregnancy  - Daryle Nova is not recommended for use in pregnancy  - Daryle Nova has not been studied in pregnancy   Daryle Nova was studied in pregnant animals only  When molnupiravir was given to pregnant animals, molnupiravir caused harm to their unborn babies   - You and your healthcare provider may decide that you should take molnupiravir duringpregnancy if there are no other COVID-19 treatment options authorized by the FDA that are accessible or clinically appropriate for you  - If you and your healthcare provider decide that you should take molnupiravir during pregnancy, you and your healthcare provider should discuss the known and potential benefits and the potential risks of taking molnupiravir during pregnancy  For individuals who are able to become pregnant:  - You should use a reliable method of birth control (contraception) consistently and correctly during treatment with molnupiravir and for 4 days after the last dose of molnupiravir  Talk to your healthcare provider about reliable birth control methods  - Before starting treatment with molnupiravir your healthcare provider may do a pregnancy test to see if you are pregnant before starting treatment with molnupiravir  - Tell your healthcare provider right away if you become pregnant or think you may be pregnant during treatment with molnupiravir  Pregnancy Surveillance Program:  - There is a pregnancy surveillance program for individuals who take molnupiravir during pregnancy  The purpose of this program is to collect information about the health of you and your baby  Talk to your healthcare provider about how to take part in this program   - If you take molnupiravir during pregnancy and you agree to participate in the pregnancy surveillance program and allow your healthcare provider to share your information with Silvana Rivera, then your healthcare provider will report your use of molnupiravir during pregnancy to [address] [place] [address],[address]  by calling [phone] or pregnancyreporting msd com      For individuals who are sexually active with partners who are able to become pregnant:  - It is not known if molnupiravir can affect sperm  While the risk is regarded as low, animal studies to fully assess the potential for molnupiravir to affect the babies of males treated with molnupiravir have not been completed  A reliable method of birth control (contraception) should be used consistently and correctly during treatment with molnupiravir and for at least 3 months after the last dose  The risk to sperm beyond 3 months is not known  Studies to understand the risk to sperm beyond 3 months are ongoing  Talk to your healthcare provider about reliable birth control methods  Talk to your healthcare provider if you have questions or concerns about how molnupiravir may affect sperm  How do I take molnupiravir? - Take molnupiravir exactly as your healthcare provider tells you to take it  - Take 4 capsules of molnupiravir every 12 hours (for example, at 8 am and at 8 pm)  - Take molnupiravir for 5 days  It is important that you complete the full 5 days of treatment with molnupiravir  Do not stop taking molnupiravir before you complete the full 5 days of treatment, even if you feel better  - Take molnupiravir with or without food  - You should stay in isolation for as long as your healthcare provider tells you to  Talk to your healthcare provider if you are not sure about how to properly isolate while you have COVID-19   - Swallow molnupiravir capsules whole  Do not open, break, or crush the capsules  If you cannot swallow capsules whole, tell your healthcare provider  What to do if you miss a dose:  - If it has been less than 10 hours since the missed dose, take it as soon as you remember  - If it has been more than 10 hours since the missed dose, skip the missed dose and take your dose at the next scheduled time  - Do not double the dose of molnupiravir to make up for a missed dose  What are the important possible side effects of molnupiravir?     Possible side effects of molnupiravir are:  - SeeNick is the most important information I should know about molnupiravir?”  - Diarrhea  - Nausea  - Dizziness    These are not all the possible side effects of molnupiravir  Not many people have taken molnupiravir  Serious and unexpected side effects may happen  This medicine is still being studied, so it is possible that all of the risks are not known at this time  What other treatment choices are there? Like Ellis Omalleyccasin may allow for the emergency use of other medicines to treat people with COVID-19  Go to https://[employer]/ for more information  It is your choice to be treated or not to be treated with molnupiravir  Should you decide not to take it, it will not change your standard medical care  What if I am breastfeeding? Breastfeeding is not recommended during treatment with molnupiravir and for 4 days after the last dose of molnupiravir  If you are breastfeeding or plan to breastfeed, talk to your healthcare provider about your options and specific situation before taking molnupiravir  How do I report side effects with molnupiravir? Contact your healthcare provider if you have any side effects that bother you or do not go away  Report side effects to FDA MedWatch at www fda gov/medwatch or call 4-249-DKB-6435 (1-687.748.5762)  How should I store [address]? - Store molnupiravir capsules at room temperature between 68°F to 77°F (20°C to 25°C)  - Keep molnupiravir and all medicines out of the reach of children and pets  Full fact sheet for patients, parents, and caregivers can be found at: [employer] [email or web address]    I have spent 17 minutes directly with the patient   Greater than 50% of this time was spent in counseling/coordination of care regarding: diagnostic results, prognosis, risks and benefits of treatment options, instructions for management, patient and family education, importance of treatment compliance, risk factor reductions and impressions  Encounter provider: Annmarie Lopez DO     Provider located at: [address]  [address]  [facility] [id number] [phone]     Recent Visits  No visits were found meeting these conditions  Showing recent visits within past 7 days and meeting all other requirements  Today's Visits  Date Type Provider Dept   12/09/22 Telemedicine Anand Zamorano DO [facility] [facility] today's visits and meeting all other requirements  Future Appointments  No visits were found meeting these conditions  Showing future appointments within next 150 days and meeting all other requirements     This virtual check-in was done via [employer] and patient was informed that this is a secure, HIPAA-compliant platform  She agrees to proceed  Patient agrees to participate in a virtual check in via telephone or video visit instead of presenting to the office to address urgent/immediate medical needs  Patient is aware this is a billable service  She acknowledged consent and understanding of privacy and security of the video platform  The patient has agreed to participate and understands they can discontinue the visit at any time  After connecting through [facility], the patient was identified by name and date of birth  Nilson Lawrence was informed that this was a telemedicine visit and that the exam was being conducted confidentially over secure lines  My office door was closed  No one else was in the room  Nilson Lawrence acknowledged consent and understanding of privacy and security of the telemedicine visit  I informed the patient that I have reviewed her record in Epic and presented the opportunity for her to ask any questions regarding the visit today  The patient agreed to participate      Verification of patient location:  Patient is located in the following state in which I hold an active license: [place]    Subjective:   Racheal Lemus is a 59 y o  female who has been screened for COVID-19  Symptom change since last report: improving  Patient's symptoms include fatigue, nasal congestion, rhinorrhea, sore throat, anosmia, loss of taste, cough, shortness of breath (Due to nasal congestion) and headache  Patient denies fever, chills, chest tightness, abdominal pain, nausea, vomiting, diarrhea and myalgias  - Date of symptom onset: 12/6/2022  - Date of positive COVID-19 test: 12/6/2022  Type of test: Home antigen  Home antigen result verified by provider  Will get picture of test uploaded/scanned into patient's chart  COVID-19 vaccination status: Fully vaccinated with booster    Za Oleary has been staying home and has isolated themselves in her home  She is taking care to not share personal items and is cleaning all surfaces that are touched often, like counters, tabletops, and doorknobs using household cleaning sprays or wipes  She is wearing a mask when she leaves her room  Using Tylenol and Lurdes [place] Plus cold and flu c minimal benefit  Eating and drinking s difficulty  No results found for: Alanna Herndon, [address], CORONAVIRUSR, [place], [facility]    Review of Systems   Constitutional: Positive for fatigue  Negative for chills and fever  HENT: Positive for congestion, rhinorrhea and sore throat  Respiratory: Positive for cough and shortness of breath (Due to nasal congestion)  Negative for chest tightness  Gastrointestinal: Negative for abdominal pain, diarrhea, nausea and vomiting  Musculoskeletal: Negative for myalgias  Neurological: Positive for headaches       Current Outpatient Medications on File Prior to Visit   Medication Sig   • ALPRAZolam (XANAX) 0 5 mg tablet TAKE ONE TABLET BY MOUTH twice daily as needed for anxiety   • flecainide (TAMBOCOR) 50 mg tablet TAKE 1 TABLET BY MOUTH TWO TIMES A DAY   • gabapentin (NEURONTIN) 800 mg tablet Take 800 mg by mouth 3 (three) times a day   • lisinopril (ZESTRIL) 10 mg tablet Take 1 tablet (10 mg total) by mouth daily   • metoprolol succinate (TOPROL-XL) 25 mg 24 hr tablet TAKE 1 TABLET BY MOUTH DAILY   • sertraline (ZOLOFT) 100 mg tablet Take 1 tablet (100 mg total) by mouth daily   • Xarelto 20 MG tablet TAKE 1 TABLET BY MOUTH DAILY   • [DISCONTINUED] methylPREDNISolone 4 MG tablet therapy pack Use as directed on package (Patient not taking: Reported on 12/9/2022)   • [DISCONTINUED] valACYclovir (VALTREX) 1,000 mg tablet Take 1 tablet (1,000 mg total) by mouth in the morning and 1 tablet (1,000 mg total) in the evening and 1 tablet (1,000 mg total) before bedtime  Do all this for 7 days  (Patient not taking: Reported on 12/9/2022)       Objective:    Temp (!) 97 4 °F (36 3 °C)   Wt 118 kg (260 lb)   BMI 44 63 kg/m²      Physical Exam  Vitals and nursing note reviewed  Constitutional:       General: She is not in acute distress  Appearance: Normal appearance  She is well-developed  She is obese  HENT:      Head: Normocephalic and atraumatic  Right Ear: External ear normal       Left Ear: External ear normal       Nose: Nose normal       Right Sinus: No maxillary sinus tenderness or frontal sinus tenderness  Left Sinus: No maxillary sinus tenderness or frontal sinus tenderness  Mouth/Throat:      Pharynx: Oropharynx is clear  No oropharyngeal exudate  Eyes:      Extraocular Movements: Extraocular movements intact  Conjunctiva/sclera: Conjunctivae normal    Pulmonary:      Effort: Pulmonary effort is normal  No respiratory distress  Musculoskeletal:         General: No swelling or tenderness  Cervical back: Normal range of motion  Right lower leg: No edema  Left lower leg: No edema  Skin:     General: Skin is warm and dry  Capillary Refill: Capillary refill takes less than 2 seconds     Neurological: General: No focal deficit present  Mental Status: She is alert and oriented to person, place, and time  Psychiatric:         Mood and Affect: Mood normal        Nany Frank,   BMI Counseling: Body mass index is 44 63 kg/m²  The BMI is above normal  Nutrition recommendations include 3-5 servings of fruits/vegetables daily  Exercise recommendations include exercising 3-5 times per week

## 2022-12-09 NOTE — PATIENT INSTRUCTIONS
Advise Ze Brown med sinus rinse kit, Mucinex, Claritin/Zyrtec/Allegra, Flonase / Nasacort  Avoid decongestants if you have high blood pressure  You may use Tylenol (Acetaminophen) up to 3,000mg daily (in 24 hours) as needed for pain or fever  Obesity   AMBULATORY CARE:   Obesity  means your body mass index (BMI) is greater than 30  Your healthcare provider will use your height and weight to measure your BMI  The risks of obesity include  many health problems, including injuries or physical disability  · Diabetes (high blood sugar level)    · High blood pressure or high cholesterol    · Heart disease    · Stroke    · Gallbladder or liver disease    · Cancer of the colon, breast, prostate, liver, or kidney    · Sleep apnea    · Arthritis or gout    Screening  is done to check for health conditions before you have signs or symptoms  If you are 28to 79years old, your blood sugar level may be checked every 3 years for signs of prediabetes or diabetes  Your healthcare provider will check your blood pressure at each visit  High blood pressure can lead to a stroke or other problems  Your provider may check for signs of heart disease, cancer, or other health problems  Seek care immediately if:   · You have a severe headache, confusion, or difficulty speaking  · You have weakness on one side of your body  · You have chest pain, sweating, or shortness of breath  Call your doctor if:   · You have symptoms of gallbladder or liver disease, such as pain in your upper abdomen  · You have knee or hip pain and discomfort while walking  · You have symptoms of diabetes, such as intense hunger and thirst, and frequent urination  · You have symptoms of sleep apnea, such as snoring or daytime sleepiness  · You have questions or concerns about your condition or care  Treatment for obesity  focuses on helping you lose weight to improve your health   Even a small decrease in BMI can reduce the risk for many health problems  Your healthcare provider will help you set a weight-loss goal   · Lifestyle changes  are the first step in treating obesity  These include making healthy food choices and getting regular physical activity  Your healthcare provider may suggest a weight-loss program that involves coaching, education, and therapy  · Medicine  may help you lose weight when it is used with a healthy foods and physical activity  · Surgery  can help you lose weight if you are very obese and have other health problems  There are several types of weight-loss surgery  Ask your healthcare provider for more information  Tips for safe weight loss:   · Set small, realistic goals  An example of a small goal is to walk for 20 minutes 5 days a week  Anther goal is to lose 5% of your body weight  · Tell friends, family members, and coworkers about your goals  and ask for their support  Ask a friend to lose weight with you, or join a weight-loss support group  · Identify foods or triggers that may cause you to overeat , and find ways to avoid them  Remove tempting high-calorie foods from your home and workplace  Place a bowl of fresh fruit on your kitchen counter  If stress causes you to eat, then find other ways to cope with stress  A counselor or therapist may be able to help you  · Keep a diary to track what you eat and drink  Also write down how many minutes of physical activity you do each day  Weigh yourself once a week and record it in your diary  Eating changes: You will need to eat 500 to 1,000 fewer calories each day than you currently eat to lose 1 to 2 pounds a week  The following changes will help you cut calories:  · Eat smaller portions  Use small plates, no larger than 9 inches in diameter  Fill your plate half full of fruits and vegetables  Measure your food using measuring cups until you know what a serving size looks like  · Eat 3 meals and 1 or 2 snacks each day    Plan your meals in advance  [place] and eat at home most of the time  Eat slowly  Do not skip meals  Skipping meals can lead to overeating later in the day  This can make it harder for you to lose weight  Talk with a dietitian to help you make a meal plan and schedule that is right for you  · Eat fruits and vegetables at every meal   They are low in calories and high in fiber, which makes you feel full  Do not add butter, margarine, or cream sauce to vegetables  Use herbs to season steamed vegetables  · Eat less fat and fewer fried foods  Eat more baked or grilled chicken and fish  These protein sources are lower in calories and fat than red meat  Limit fast food  Dress your salads with olive oil and vinegar instead of bottled dressing  · Limit the amount of sugar you eat  Do not drink sugary beverages  Limit alcohol  Activity changes:  Physical activity is good for your body in many ways  It helps you burn calories and build strong muscles  It decreases stress and depression, and improves your mood  It can also help you sleep better  Talk to your healthcare provider before you begin an exercise program   · Exercise for at least 30 minutes 5 days a week  Start slowly  Set aside time each day for physical activity that you enjoy and that is convenient for you  It is best to do both weight training and an activity that increases your heart rate, such as walking, bicycling, or swimming  · Find ways to be more active  Do yard work and housecleaning  Walk up the stairs instead of using elevators  Spend your leisure time going to events that require walking, such as outdoor festivals or fairs  This extra physical activity can help you lose weight and keep it off  Follow up with your doctor as directed: You may need to meet with a dietitian  Write down your questions so you remember to ask them during your visits    © Copyright [employer] 2022 Information is for End User's use only and may not be sold, redistributed or otherwise used for commercial purposes  All illustrations and images included in CareNotes® are the copyrighted property of A D A M , Inc  or Lolita Rivera  The above information is an  only  It is not intended as medical advice for individual conditions or treatments  Talk to your doctor, nurse or pharmacist before following any medical regimen to see if it is safe and effective for you

## 2022-12-21 DIAGNOSIS — F41.1 GAD (GENERALIZED ANXIETY DISORDER): ICD-10-CM

## 2022-12-21 RX ORDER — ALPRAZOLAM 0.5 MG/1
TABLET ORAL
Qty: 60 TABLET | Refills: 0 | Status: SHIPPED | OUTPATIENT
Start: 2022-12-21

## 2022-12-28 DIAGNOSIS — I10 ESSENTIAL HYPERTENSION: ICD-10-CM

## 2022-12-28 DIAGNOSIS — F41.1 GAD (GENERALIZED ANXIETY DISORDER): ICD-10-CM

## 2022-12-28 RX ORDER — LISINOPRIL 10 MG/1
TABLET ORAL
Qty: 30 TABLET | Refills: 5 | Status: SHIPPED | OUTPATIENT
Start: 2022-12-28

## 2022-12-28 RX ORDER — SERTRALINE HYDROCHLORIDE 100 MG/1
TABLET, FILM COATED ORAL
Qty: 30 TABLET | Refills: 5 | Status: SHIPPED | OUTPATIENT
Start: 2022-12-28

## 2023-01-04 DIAGNOSIS — I48.0 PAF (PAROXYSMAL ATRIAL FIBRILLATION) (HCC): ICD-10-CM

## 2023-01-04 RX ORDER — METOPROLOL SUCCINATE 25 MG/1
TABLET, EXTENDED RELEASE ORAL
Qty: 90 TABLET | Refills: 3 | Status: SHIPPED | OUTPATIENT
Start: 2023-01-04

## 2023-01-18 DIAGNOSIS — F41.1 GAD (GENERALIZED ANXIETY DISORDER): ICD-10-CM

## 2023-01-18 RX ORDER — ALPRAZOLAM 0.5 MG/1
TABLET ORAL
Qty: 60 TABLET | Refills: 0 | Status: SHIPPED | OUTPATIENT
Start: 2023-01-18

## 2023-02-18 DIAGNOSIS — F41.1 GAD (GENERALIZED ANXIETY DISORDER): ICD-10-CM

## 2023-02-21 RX ORDER — ALPRAZOLAM 0.5 MG/1
TABLET ORAL
Qty: 60 TABLET | Refills: 0 | Status: SHIPPED | OUTPATIENT
Start: 2023-02-21

## 2023-02-21 NOTE — TELEPHONE ENCOUNTER
Pt states she is out and kindly asks if you can fill today  She will provide more notice in the future

## 2023-02-28 DIAGNOSIS — F41.1 GAD (GENERALIZED ANXIETY DISORDER): Primary | ICD-10-CM

## 2023-02-28 RX ORDER — SERTRALINE HYDROCHLORIDE 100 MG/1
150 TABLET, FILM COATED ORAL DAILY
Qty: 45 TABLET | Refills: 3 | Status: SHIPPED | OUTPATIENT
Start: 2023-02-28

## 2023-03-23 DIAGNOSIS — F41.1 GAD (GENERALIZED ANXIETY DISORDER): ICD-10-CM

## 2023-03-23 RX ORDER — ALPRAZOLAM 0.5 MG/1
TABLET ORAL
Qty: 60 TABLET | Refills: 0 | Status: SHIPPED | OUTPATIENT
Start: 2023-03-23

## 2023-04-04 ENCOUNTER — RA CDI HCC (OUTPATIENT)
Dept: OTHER | Facility: HOSPITAL | Age: 58
End: 2023-04-04

## 2023-04-04 NOTE — PROGRESS NOTES
[facility]  coding opportunities          Chart Reviewed number of suggestions sent to Provider: 2   [phone]  [phone]    Patients Insurance        Commercial Insurance: [employer]

## 2023-04-25 ENCOUNTER — OFFICE VISIT (OUTPATIENT)
Dept: FAMILY MEDICINE CLINIC | Facility: CLINIC | Age: 58
End: 2023-04-25

## 2023-04-25 VITALS
SYSTOLIC BLOOD PRESSURE: 128 MMHG | RESPIRATION RATE: 18 BRPM | TEMPERATURE: 97.8 F | WEIGHT: 260 LBS | OXYGEN SATURATION: 98 % | DIASTOLIC BLOOD PRESSURE: 76 MMHG | BODY MASS INDEX: 44.39 KG/M2 | HEART RATE: 79 BPM | HEIGHT: 64 IN

## 2023-04-25 DIAGNOSIS — I48.0 PAF (PAROXYSMAL ATRIAL FIBRILLATION) (HCC): ICD-10-CM

## 2023-04-25 DIAGNOSIS — F41.1 GAD (GENERALIZED ANXIETY DISORDER): ICD-10-CM

## 2023-04-25 DIAGNOSIS — D68.9 COAGULOPATHY (HCC): ICD-10-CM

## 2023-04-25 DIAGNOSIS — I10 ESSENTIAL HYPERTENSION: ICD-10-CM

## 2023-04-25 DIAGNOSIS — Z12.31 BREAST CANCER SCREENING BY MAMMOGRAM: ICD-10-CM

## 2023-04-25 DIAGNOSIS — E66.01 MORBID (SEVERE) OBESITY DUE TO EXCESS CALORIES (HCC): ICD-10-CM

## 2023-04-25 DIAGNOSIS — E78.2 MIXED HYPERLIPIDEMIA: Primary | ICD-10-CM

## 2023-04-25 RX ORDER — ALPRAZOLAM 0.5 MG/1
TABLET ORAL
Qty: 60 TABLET | Refills: 0 | Status: SHIPPED | OUTPATIENT
Start: 2023-04-25

## 2023-04-25 RX ORDER — BUSPIRONE HYDROCHLORIDE 7.5 MG/1
7.5 TABLET ORAL 2 TIMES DAILY
Qty: 60 TABLET | Refills: 5 | Status: SHIPPED | OUTPATIENT
Start: 2023-04-25

## 2023-04-25 NOTE — PATIENT INSTRUCTIONS
Obtain labs  Over-the-counter vitamin d3 2000 IU daily  Start buspar twice daily  Tentative plan is wegovy

## 2023-04-25 NOTE — PROGRESS NOTES
Name: Krista Suazo      : 1965      MRN: [id number]  Encounter Provider: Onel Ramos DO  Encounter Date: 2023   Encounter department: [address] [place] [address]     1  Mixed hyperlipidemia  Comments:  issue would be helped by weight loss  doesn't meet criteria for meds at this time      2  Essential hypertension  Comments:  at goal on current meds  continue same  issue would be helped by weight loss    3  Morbid (severe) obesity due to excess calories ([facility] )  Comments:  sedentary job, and weight gain worsened by inability to exercise due to knees  pt would be good candidate for wegovy  check labs  discussed risks/benefits; pt would like to proceed  Orders:  -     Comprehensive metabolic panel; Future  -     Hemoglobin A1C; Future  -     TSH, 3rd generation; Future  -     CBC and differential; Future  -     Ambulatory Referral to Nutrition Services; Future    4  PAF (paroxysmal atrial fibrillation) (HCC)  Comments:  tolerating rate/rhythm control  continue a/c    5  ANNABELLA (generalized anxiety disorder)  Comments:  add buspar for anxiety  continue zoloft  would like to see xanax use decrease if able  Orders:  -     ALPRAZolam (XANAX) 0 5 mg tablet; TAKE ONE TABLET BY MOUTH twice daily as needed for anxiety  -     busPIRone (BUSPAR) 7 5 mg tablet; Take 1 tablet (7 5 mg total) by mouth 2 (two) times a day    6  Coagulopathy ([facility] )    7  Breast cancer screening by mammogram  -     Mammo screening bilateral w 3d & cad; Future; Expected date: 2023           Subjective      HPIpt presents in routine f/u  Lipids from   ldl 187   10 yr risk 5 1%  Blood pressure appropriate today  Tolerating lisinopril  No chest pain, shortness of breath, n/v, abd pain, visual changes, paresthesias, weakness  Pt seeing cardiology for afib  Tolerating a/c    Using xanax BID--in AM, using prior to work as work causes a lot of worry, overwhelmedness    At night, using for sleep  Depression is well-controlled on zoloft  Has hx of gastric sleeve with weight loss, but has put weight back on largely due to sedentary job and arthritic knees  Has been working on diet and has seen nutrition in the past after bariatric surgery  Trying to eat/diet appropriately  The trouble is that she cannot exercise--needs knee replacement  Failing steroid injections  Knows weight loss would be helpful in terms of knee arthritis and even surgical recovery, but can't lose weight because she's not able to walk due to the knee pain  No hx of pancreatitis  No family or personal hx thyroid ca or MEN1  Review of Systems   Constitutional: Negative for chills, fatigue, fever and unexpected weight change  HENT: Negative for congestion, ear pain, hearing loss, postnasal drip, rhinorrhea, sinus pressure, sinus pain, sore throat, trouble swallowing and voice change  Eyes: Negative for pain, redness and visual disturbance  Respiratory: Negative for cough and shortness of breath  Cardiovascular: Negative for chest pain, palpitations and leg swelling  Gastrointestinal: Negative for abdominal pain, constipation, diarrhea and nausea  Endocrine: Negative for cold intolerance, heat intolerance, polydipsia and polyuria  Genitourinary: Negative for dysuria, frequency and urgency  Musculoskeletal: Positive for arthralgias  Negative for joint swelling and myalgias  Skin: Negative for rash  No suspicious lesions   Neurological: Negative for weakness, numbness and headaches  Hematological: Negative for adenopathy         Current Outpatient Medications on File Prior to Visit   Medication Sig   • flecainide (TAMBOCOR) 50 mg tablet TAKE 1 TABLET BY MOUTH TWO TIMES A DAY   • gabapentin (NEURONTIN) 800 mg tablet Take 800 mg by mouth 3 (three) times a day   • lisinopril (ZESTRIL) 10 mg tablet TAKE ONE TABLET BY MOUTH EVERY DAY   • metoprolol succinate (TOPROL-XL) 25 mg 24 hr tablet "TAKE ONE TABLET BY MOUTH EVERY DAY   • sertraline (ZOLOFT) 100 mg tablet Take 1 5 tablets (150 mg total) by mouth daily   • Xarelto 20 MG tablet TAKE 1 TABLET BY MOUTH DAILY   • [DISCONTINUED] ALPRAZolam (XANAX) 0 5 mg tablet TAKE ONE TABLET BY MOUTH twice daily as needed for anxiety       Objective     /76   Pulse 79   Temp 97 8 °F (36 6 °C)   Resp 18   Ht 5' 4\" (1 626 m)   Wt 118 kg (260 lb)   SpO2 98%   BMI 44 63 kg/m²     Physical Exam  Constitutional:       General: She is not in acute distress  Appearance: She is well-developed  She is obese  HENT:      Head: Normocephalic and atraumatic  Right Ear: Tympanic membrane, ear canal and external ear normal       Left Ear: Tympanic membrane, ear canal and external ear normal       Nose: Nose normal       Mouth/Throat:      Pharynx: No oropharyngeal exudate  Eyes:      Conjunctiva/sclera: Conjunctivae normal       Pupils: Pupils are equal, round, and reactive to light  Neck:      Thyroid: No thyromegaly  Vascular: No carotid bruit or JVD  Cardiovascular:      Rate and Rhythm: Regular rhythm  Heart sounds: S1 normal and S2 normal  No murmur heard  No friction rub  No gallop  No S3 or S4 sounds  Pulmonary:      Effort: Pulmonary effort is normal       Breath sounds: Normal breath sounds  No wheezing, rhonchi or rales  Abdominal:      General: Bowel sounds are normal  There is no distension  Palpations: Abdomen is soft  Tenderness: There is no abdominal tenderness  Lymphadenopathy:      Cervical: No cervical adenopathy  Neurological:      General: No focal deficit present  Mental Status: She is alert and oriented to person, place, and time  Cranial Nerves: No cranial nerve deficit  Sensory: No sensory deficit  Deep Tendon Reflexes: Reflexes are normal and symmetric  Psychiatric:         Mood and Affect: Mood normal          Behavior: Behavior normal          Thought Content:  Thought " content normal          Judgment: Judgment normal        Marita Prince, DO

## 2023-04-27 LAB — HBA1C MFR BLD HPLC: 5.7 %

## 2023-04-28 DIAGNOSIS — E66.01 MORBID (SEVERE) OBESITY DUE TO EXCESS CALORIES (HCC): ICD-10-CM

## 2023-04-28 DIAGNOSIS — R73.01 IFG (IMPAIRED FASTING GLUCOSE): Primary | ICD-10-CM

## 2023-05-10 DIAGNOSIS — R73.01 IFG (IMPAIRED FASTING GLUCOSE): Primary | ICD-10-CM

## 2023-05-10 DIAGNOSIS — E66.01 MORBID (SEVERE) OBESITY DUE TO EXCESS CALORIES (HCC): ICD-10-CM

## 2023-05-20 DIAGNOSIS — F41.1 GAD (GENERALIZED ANXIETY DISORDER): ICD-10-CM

## 2023-05-22 DIAGNOSIS — F41.1 GAD (GENERALIZED ANXIETY DISORDER): ICD-10-CM

## 2023-05-22 RX ORDER — ALPRAZOLAM 0.5 MG/1
TABLET ORAL
Qty: 60 TABLET | Refills: 0 | Status: SHIPPED | OUTPATIENT
Start: 2023-05-22

## 2023-05-22 RX ORDER — BUSPIRONE HYDROCHLORIDE 7.5 MG/1
7.5 TABLET ORAL 2 TIMES DAILY
Qty: 60 TABLET | Refills: 0 | Status: SHIPPED | OUTPATIENT
Start: 2023-05-22

## 2023-05-24 DIAGNOSIS — R73.01 IFG (IMPAIRED FASTING GLUCOSE): Primary | ICD-10-CM

## 2023-05-24 DIAGNOSIS — E66.01 MORBID (SEVERE) OBESITY DUE TO EXCESS CALORIES (HCC): ICD-10-CM

## 2023-06-01 ENCOUNTER — OFFICE VISIT (OUTPATIENT)
Dept: CARDIOLOGY CLINIC | Facility: CLINIC | Age: 58
End: 2023-06-01

## 2023-06-01 VITALS — OXYGEN SATURATION: 94 % | DIASTOLIC BLOOD PRESSURE: 70 MMHG | SYSTOLIC BLOOD PRESSURE: 120 MMHG | HEART RATE: 52 BPM

## 2023-06-01 DIAGNOSIS — I48.0 PAF (PAROXYSMAL ATRIAL FIBRILLATION) (HCC): Primary | ICD-10-CM

## 2023-06-01 DIAGNOSIS — I10 ESSENTIAL HYPERTENSION: ICD-10-CM

## 2023-06-01 NOTE — PROGRESS NOTES
Cardiology Follow Up    William Oneal  [id number]  1965  [address] [facility] [place] [address] [address] [facility]  [id number]      1  PAF (paroxysmal atrial fibrillation) ([facility])  POCT ECG      2  Essential hypertension  POCT ECG          Discussion/Summary:    - paroxysmal atrial fibrillation: Baseline sinus bradycardia  She is on low-dose metoprolol and also on flecainide  This is maintaining sinus rhythm  Asymptomatic with a heart rate in the 50s therefore continue current medication  Anticoagulation with Xarelto  Stress test was done in the past  No need to repeat currently  Preoperative cardiac restratification: Overall low risk from a cardiac standpoint  She has no anginal symptoms currently  While her level of functioning is impaired, prior ischemic evaluation was unremarkable and she is otherwise without any major changes  For an upcoming orthopedic surgery which is planned, she may hold her anticoagulation for 2 days prior to her planned surgery  She should resume the anticoagulation postoperatively as instructed by the surgeon  If she is to be on parenteral anticoagulation per a orthopedic protocol, then she should just resume the Xarelto once this is complete  While she is bradycardic, I recommended continuing the beta-blocker in the antiarrhythmic medication perioperatively to minimize risk of perioperative A-fib  Recommend not holding BB or flecainide unless her HR is sustained < 50 BPM          Previous History:  49-year-old female  History of PAF  Previous history of hypertension  Currently controlled  She has paroxysmal atrial fibrillation  Discovered several years ago when she was at work  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  She was having palpitations and heart rate was irregular    She was escorted to the hospital where she was found to be in atrial fibrillation  She ultimately cardioverted with medical therapy, did not require electrical cardioversion  Since that time, she has been on flecainide, metoprolol, and Xarelto  Nuc stress normal in 2016  Interval History:  She returns to the office today, a little overdue for follow-up  Since last visit with me, she had a mechanical fall when she had a significant left lower extremity injury which initially was thought to require fasciotomy but then she was treated conservatively  She had a lot of skin blistering but this has now healed for the most part  She did not require an extended period of time off of anticoagulation, per her report  She is having arthritis of her knees  She is anticipating surgery in the fall  She is going to be having both knees done at the same time  On blood work, impaired fasting glucose  She was started on Ozempic  This is also hopeful in her losing some weight  No issues from cardiac standpoint  Since that time she is last seen me, she thinks she maybe had 2 episodes of palpitations which lasted less than an hour  If she has palpitations lasting more than an hour, she will present to the emergency room  She is not able to be very active because of the orthopedic issues, but she denies any chest pain or shortness of breath, PND, orthopnea, or lower extremity edema      Patient Active Problem List   Diagnosis   • Dysthymic disorder   • Encounter for screening mammogram for breast cancer   • Essential hypertension   • Family history of heart disease   • Knee pain, bilateral   • Mixed hyperlipidemia   • PAF (paroxysmal atrial fibrillation) ([facility] )   • Snoring   • Coagulopathy (HCC)   • Depression, recurrent (HCC)   • Morbid (severe) obesity due to excess calories [facility])     Past Medical History:   Diagnosis Date   • A-fib ([facility] )    • Allergic    • Anemia    • Anxiety    • Arthritis     Knees and back    • Depression    • Fall    • Headache(784 0)    • Hiatal hernia    • Hyperlipidemia    • Hypertension    • Morbid obesity ([facility] )    • Obesity    • Pneumonia      Social History     Tobacco Use   • Smoking status: Never   • Smokeless tobacco: Never   Vaping Use   • Vaping Use: Never used   Substance Use Topics   • Alcohol use: Yes     Comment: VERY LITTLE USE    • Drug use: Never      Family History   Problem Relation Age of Onset   • Arthritis Mother    • COPD Mother    • Diabetes Mother    • Obesity Mother    • Coronary artery disease Mother    • Coronary artery disease Father    • Diabetes Father    • Heart attack Father    • Heart disease Father    • Obesity Father    • Alzheimer's disease Paternal Grandfather    • Dementia Paternal Grandfather      Past Surgical History:   Procedure Laterality Date   • BACK SURGERY  1998   • ESOPHAGOGASTRODUODENOSCOPY  10/17/2016   • HIATAL HERNIA REPAIR  10/17/2016    [facility] DO      • LUMBAR DISC SURGERY  05/1998    Approx   • PARTIAL GASTRECTOMY  10/17/2016    [facility] DO      • SPINE SURGERY      25 years ago   • VAGINAL DELIVERY      x1       Current Outpatient Medications:   •  ALPRAZolam (XANAX) 0 5 mg tablet, TAKE ONE TABLET BY MOUTH twice daily as needed for anxiety, Disp: 60 tablet, Rfl: 0  •  busPIRone (BUSPAR) 7 5 mg tablet, Take 1 tablet (7 5 mg total) by mouth 2 (two) times a day, Disp: 60 tablet, Rfl: 0  •  flecainide (TAMBOCOR) 50 mg tablet, TAKE 1 TABLET BY MOUTH TWO TIMES A DAY, Disp: 180 tablet, Rfl: 3  •  gabapentin (NEURONTIN) 800 mg tablet, Take 800 mg by mouth 3 (three) times a day, Disp: , Rfl:   •  lisinopril (ZESTRIL) 10 mg tablet, TAKE ONE TABLET BY MOUTH EVERY DAY, Disp: 30 tablet, Rfl: 5  •  metoprolol succinate (TOPROL-XL) 25 mg 24 hr tablet, TAKE ONE TABLET BY MOUTH EVERY DAY, Disp: 90 tablet, Rfl: 3  •  semaglutide, 0 25 or 0 5 mg/dose, (Ozempic, 0 25 or 0 5 MG/DOSE,) 2 mg/3 mL injection pen, Inject 0 75 mL (0 5 mg total) under the skin every 7 days, Disp: 3 mL, Rfl: 0  •  Semaglutide-Weight Management (WEGOVY) 0 25 MG/0 5ML, Inject 0 5 mL (0 25 mg total) under the skin once a week, Disp: 1 mL, Rfl: 0  •  sertraline (ZOLOFT) 100 mg tablet, Take 1 5 tablets (150 mg total) by mouth daily, Disp: 45 tablet, Rfl: 3  •  Xarelto 20 MG tablet, TAKE 1 TABLET BY MOUTH DAILY, Disp: 90 tablet, Rfl: 3  No Known Allergies    Vitals:    06/01/23 1613 06/01/23 1638   BP: 148/82 120/70   BP Location: Left arm    Patient Position: Sitting    Cuff Size: Large    Pulse: (!) 52    SpO2: 94%      Vitals: There is no height or weight on file to calculate BMI  Physical Exam:  GEN: Erum Main appears well, alert and oriented x 3, pleasant and cooperative   HEENT: pupils equal, round, and reactive to light; extraocular muscles intact  NECK: supple, no carotid bruits   HEART: Bradycardic rhythm, normal S1 and S2, no murmurs, clicks, gallops or rubs   LUNGS: clear to auscultation bilaterally; no wheezes, rales, or rhonchi   ABDOMEN: normal bowel sounds, soft, no tenderness, no distention  EXTREMITIES: peripheral pulses normal; no clubbing, cyanosis, or edema  NEURO: no focal findings   SKIN: normal without suspicious lesions on exposed skin      ROS:  Positive for knee pain with arthritis, palpitations  Except as noted in HPI, is otherwise reviewed in detail and a 12 point review of systems is negative  ROS reviewed and is unchanged    EKG:  Sinus bradycardia  52 bpm  Otherwise normal EKG

## 2023-06-12 ENCOUNTER — PATIENT MESSAGE (OUTPATIENT)
Dept: FAMILY MEDICINE CLINIC | Facility: CLINIC | Age: 58
End: 2023-06-12

## 2023-06-13 ENCOUNTER — PATIENT MESSAGE (OUTPATIENT)
Dept: FAMILY MEDICINE CLINIC | Facility: CLINIC | Age: 58
End: 2023-06-13

## 2023-06-13 DIAGNOSIS — R73.01 IFG (IMPAIRED FASTING GLUCOSE): Primary | ICD-10-CM

## 2023-06-21 ENCOUNTER — PATIENT MESSAGE (OUTPATIENT)
Dept: FAMILY MEDICINE CLINIC | Facility: CLINIC | Age: 58
End: 2023-06-21

## 2023-06-21 DIAGNOSIS — F41.1 GAD (GENERALIZED ANXIETY DISORDER): ICD-10-CM

## 2023-06-21 RX ORDER — ALPRAZOLAM 0.5 MG/1
TABLET ORAL
Qty: 60 TABLET | Refills: 0 | Status: SHIPPED | OUTPATIENT
Start: 2023-06-21

## 2023-06-21 RX ORDER — BUSPIRONE HYDROCHLORIDE 7.5 MG/1
7.5 TABLET ORAL 2 TIMES DAILY
Qty: 60 TABLET | Refills: 0 | Status: SHIPPED | OUTPATIENT
Start: 2023-06-21

## 2023-07-01 DIAGNOSIS — I48.0 PAF (PAROXYSMAL ATRIAL FIBRILLATION) (HCC): ICD-10-CM

## 2023-07-03 RX ORDER — RIVAROXABAN 20 MG/1
TABLET, FILM COATED ORAL
Qty: 90 TABLET | Refills: 3 | Status: SHIPPED | OUTPATIENT
Start: 2023-07-03

## 2023-07-03 RX ORDER — FLECAINIDE ACETATE 50 MG/1
TABLET ORAL
Qty: 180 TABLET | Refills: 3 | Status: SHIPPED | OUTPATIENT
Start: 2023-07-03

## 2023-07-05 DIAGNOSIS — R73.01 IFG (IMPAIRED FASTING GLUCOSE): ICD-10-CM

## 2023-07-06 DIAGNOSIS — I10 ESSENTIAL HYPERTENSION: ICD-10-CM

## 2023-07-06 RX ORDER — LISINOPRIL 10 MG/1
TABLET ORAL
Qty: 30 TABLET | Refills: 0 | Status: SHIPPED | OUTPATIENT
Start: 2023-07-06 | End: 2023-07-07 | Stop reason: SDUPTHER

## 2023-07-07 RX ORDER — LISINOPRIL 10 MG/1
10 TABLET ORAL DAILY
Qty: 30 TABLET | Refills: 5 | Status: SHIPPED | OUTPATIENT
Start: 2023-07-07

## 2023-07-23 DIAGNOSIS — F41.1 GAD (GENERALIZED ANXIETY DISORDER): ICD-10-CM

## 2023-07-24 RX ORDER — ALPRAZOLAM 0.5 MG/1
TABLET ORAL
Qty: 60 TABLET | Refills: 0 | Status: SHIPPED | OUTPATIENT
Start: 2023-07-24

## 2023-07-24 RX ORDER — BUSPIRONE HYDROCHLORIDE 7.5 MG/1
7.5 TABLET ORAL 2 TIMES DAILY
Qty: 60 TABLET | Refills: 0 | Status: SHIPPED | OUTPATIENT
Start: 2023-07-24

## 2023-07-26 ENCOUNTER — TELEPHONE (OUTPATIENT)
Dept: CARDIOLOGY CLINIC | Facility: CLINIC | Age: 58
End: 2023-07-26

## 2023-07-26 ENCOUNTER — CONSULT (OUTPATIENT)
Dept: GASTROENTEROLOGY | Facility: CLINIC | Age: 58
End: 2023-07-26

## 2023-07-26 ENCOUNTER — TELEPHONE (OUTPATIENT)
Dept: GASTROENTEROLOGY | Facility: CLINIC | Age: 58
End: 2023-07-26

## 2023-07-26 VITALS
BODY MASS INDEX: 44.63 KG/M2 | TEMPERATURE: 99.1 F | SYSTOLIC BLOOD PRESSURE: 120 MMHG | HEIGHT: 64 IN | DIASTOLIC BLOOD PRESSURE: 72 MMHG

## 2023-07-26 DIAGNOSIS — D64.9 ANEMIA, UNSPECIFIED TYPE: ICD-10-CM

## 2023-07-26 RX ORDER — SOD SULF/POT CHLORIDE/MAG SULF 1.479 G
TABLET ORAL
Qty: 24 TABLET | Refills: 0 | Status: SHIPPED | OUTPATIENT
Start: 2023-07-26

## 2023-07-26 NOTE — TELEPHONE ENCOUNTER
Our mutual patient is scheduled for procedure: EGD/Colonoscopy    On:   9/11/23      With: Dr. Santamaria________    He/She is taking the following blood thinner:   Xarelto       Can this be stopped ____2__ days prior to the procedure?       Physician Approving clearance: ________________________

## 2023-07-26 NOTE — PATIENT INSTRUCTIONS
Scheduled date of colonoscopy /EGD (as of today): 9/11/23  Physician performing colonoscopy/ EGD: Dr. Ruth Ontiveros  Location of colonoscopy/EGD: [facility]  Bowel prep reviewed with patient: Sutab  Instructions reviewed with patient by: Vianey  Clearances:  nivia

## 2023-07-26 NOTE — PROGRESS NOTES
[place] [facility] - Outpatient Consultation  Aimee Holder 59 y.o. female MRN: [id number]  Encounter: [id number]          ASSESSMENT AND PLAN:       Daisy Olmstead is a 60 y/o female who is s/p gastric sleeve with HTN, PAF on xarelto, HLD who presents for consultation of anemia. 1. Normocytic anemia   2. S/P gastric sleeve   Pt has had low Hgb intermittently over the yrs, at times normalizing, however most recent blood work 4/2023 noted Hgb at 10.9. Pt is s/p gastric sleeve with her last EGD being about 7 yrs ago, prior to her surgery. No prior colonoscopy. -repeat CBC and iron panel  -EGD and colonoscopy ordered to evaluate for malignancy, AVMs, PUD however I explained that her bariatric status puts her at risk for nutritional deficiencies as well so I suggest she gets the Vitamin B12 drawn that was ordered back in May    ______________________________________________________________________    HPI:  Daisy lOmstead is a 60 y/o female who is s/p gastric sleeve with HTN, PAF on xarelto, HLD who presents for consultation of anemia. Pt says that she is here to be scheduled for scopes regarding her anemia, which she says has been an issue in the past but never evaluated. Pt underwent sleeve gastrectomy about 7 yrs ago in [facility], which is when her last EGD was. Pt denies family hx of colon cancer, unintentional weight loss, fevers, chills, night sweats, abdominal pain, n/v, change in bowel habits, NSAID use, bloody or black BMs. Pt is on xarelto. Pt denies any other abdominal surgical hx. Pt denies prior colonoscopy. REVIEW OF SYSTEMS:    CONSTITUTIONAL: Denies any fever, chills, rigors, and weight loss. HEENT: No earache or tinnitus. Denies hearing loss or visual disturbances. CARDIOVASCULAR: No chest pain or palpitations. RESPIRATORY: Denies any cough, hemoptysis, shortness of breath or dyspnea on exertion. GASTROINTESTINAL: As noted in the History of Present Illness.    GENITOURINARY: No problems with urination. Denies any hematuria or dysuria. NEUROLOGIC: No dizziness or vertigo, denies headaches. MUSCULOSKELETAL: Denies any muscle or joint pain. SKIN: Denies skin rashes or itching. ENDOCRINE: Denies excessive thirst. Denies intolerance to heat or cold. PSYCHOSOCIAL: Denies depression or anxiety. Denies any recent memory loss.        Historical Information   Past Medical History:   Diagnosis Date   • A-fib ([address])    • Allergic    • Anemia    • Anxiety    • Arthritis     Knees and back    • Depression    • Fall    • Headache(784.0)    • Hiatal hernia    • Hyperlipidemia    • Hypertension    • Morbid obesity ([address])    • Obesity    • Pneumonia      Past Surgical History:   Procedure Laterality Date   • BACK SURGERY  1998   • ESOPHAGOGASTRODUODENOSCOPY  10/17/2016   • HIATAL HERNIA REPAIR  10/17/2016    CIRO Solo DO      • LUMBAR [facility] SURGERY  05/1998    Approx   • PARTIAL GASTRECTOMY  10/17/2016    CIRO Solo DO      • SPINE SURGERY      25 years ago   • VAGINAL DELIVERY      x1     Social History   Social History     Substance and Sexual Activity   Alcohol Use Yes    Comment: VERY LITTLE USE      Social History     Substance and Sexual Activity   Drug Use Never     Social History     Tobacco Use   Smoking Status Never   Smokeless Tobacco Never     Family History   Problem Relation Age of Onset   • Arthritis Mother    • COPD Mother    • Diabetes Mother    • Obesity Mother    • Coronary artery disease Mother    • Coronary artery disease Father    • Diabetes Father    • Heart attack Father    • Heart disease Father    • Obesity Father    • Alzheimer's disease Paternal Grandfather    • Dementia Paternal Grandfather        Meds/Allergies       Current Outpatient Medications:   •  ALPRAZolam (XANAX) 0.5 mg tablet  •  busPIRone (BUSPAR) 7.5 mg tablet  •  flecainide (TAMBOCOR) 50 mg tablet  •  gabapentin (NEURONTIN) 800 mg tablet  •  lisinopril (ZESTRIL) 10 mg tablet  •  metoprolol succinate (TOPROL-XL) 25 mg 24 hr tablet  •  semaglutide, 2 mg/dose, (Ozempic) 8 mg/ mL injection pen  •  sertraline (ZOLOFT) 100 mg tablet  •  Sodium Sulfate-Mag Sulfate-KCl (Sutab) 9849-454-164 MG TABS  •  Xarelto 20 MG tablet    No Known Allergies        Objective     Blood pressure 120/72, temperature 99.1 °F (37.3 °C), temperature source Tympanic, height 5' 4" (1.626 m). Body mass index is 44.63 kg/m². PHYSICAL EXAM:      General Appearance:   Alert, cooperative, no distress   HEENT:   Normocephalic, atraumatic, anicteric.     Neck:  Supple, symmetrical, trachea midline   Lungs:   Clear to auscultation bilaterally; no rales, rhonchi or wheezing; respirations unlabored    Heart[de-identified]   Regular rate and rhythm; no murmur, rub, or gallop. Abdomen:   Soft, non-tender, non-distended; normal bowel sounds; no masses, no organomegaly    Genitalia:   Deferred    Rectal:   Deferred    Extremities:  No cyanosis, clubbing or edema    Pulses:  2+ and symmetric    Skin:  No jaundice, rashes, or lesions    Lymph nodes:  No palpable cervical lymphadenopathy        Lab Results:   No visits with results within 1 Day(s) from this visit. Latest known visit with results is:   Orders Only on 04/27/2023   Component Date Value   • Hemoglobin A1C 04/27/2023 5.7          Radiology Results:   No results found. Answers for HPI/ROS submitted by the patient on 7/19/2023  Progression since onset: resolved  Pain - numeric: 0/10  anorexia: No  arthralgias: Yes  belching: No  constipation: No  diarrhea: No  dysuria: No  fever: No  flatus: No  frequency: No  headaches: No  hematochezia: No  hematuria: No  melena: No  myalgias: No  nausea:  No  weight loss: No  vomiting: No

## 2023-07-26 NOTE — TELEPHONE ENCOUNTER
Darrell Alston 41 minutes ago (4:05 PM)     CM  Our mutual patient is scheduled for procedure: EGD/Colonoscopy     On:   9/11/23       With: Dr. Santamaria________     He/She is taking the following blood thinner:       Xarelto        Can this be stopped ____2__ days prior to the procedure

## 2023-07-27 NOTE — TELEPHONE ENCOUNTER
Scheduled date of colonoscopy (as of today):9/11/2023    Physician performing colonoscopy:HUBER    Location of colonoscopy: [facility]    Bowel prep reviewed with patient:yes    Instructions reviewed with patient by:Vanda HOOD    Clearances: will need cardio faiza - [address]  Will need H&H lab results    [address] [facility],[address] Assessment    Name: Yamilet Garcia  YOB: 1965  Last Height: 5' 4" (1.626 m)  Last weight: 118 kg (260 lb)  BMI: 44.63 kg/m²  Procedure: Colon  Diagnosis: screening crc  Date of procedure: 9/11/2023  Prep: ordered  Responsible : son  Phone#: [phone]  Name completing form: Mikki Rubin MA  Date form completed: 07/27/23      If the patient answers yes to any of these questions, schedule in a hospital  Are you pregnant: No  Do you rely on a wheelchair for mobility: No  Have you been diagnosed with End Stage Renal Disease (ESRD): No  Do you need oxygen during the day: No  Have you had a heart attack or stroke within the past three months: No  Have you had a seizure within the past three months: No  Have you ever been informed by anesthesia that you have a difficult airway: No  Additional Questions  Have you had any cardiac testing or are under the care of a Cardiologist (see cardiac list): Yes (Comment: Obtain Cardiac Clearance)  Cardiac list:   Do you have an implanted cardiac defibrillator: No (Comment:  This patient should be scheduled in the hospital)    Have any bleeding problems, such as anemia or hemophilia (If patient has H&H result below 8, schedule in hospital.  H&H must be within 30 days of procedure): Yes (Comment: Obtain H&H lab result)    Had an organ transplant within the past 3 months: No    Do you have any present infections: No  Do you get short of breath when walking a few blocks: No  Have you been diagnosed with diabetes: No  Comments (provide cardiac provider information if applicable):

## 2023-08-04 ENCOUNTER — PATIENT MESSAGE (OUTPATIENT)
Dept: FAMILY MEDICINE CLINIC | Facility: CLINIC | Age: 58
End: 2023-08-04

## 2023-08-04 DIAGNOSIS — M25.561 CHRONIC PAIN OF BOTH KNEES: Primary | ICD-10-CM

## 2023-08-04 DIAGNOSIS — R73.01 IFG (IMPAIRED FASTING GLUCOSE): ICD-10-CM

## 2023-08-04 DIAGNOSIS — M25.562 CHRONIC PAIN OF BOTH KNEES: Primary | ICD-10-CM

## 2023-08-04 DIAGNOSIS — G89.29 CHRONIC PAIN OF BOTH KNEES: Primary | ICD-10-CM

## 2023-08-04 DIAGNOSIS — F51.01 PRIMARY INSOMNIA: Primary | ICD-10-CM

## 2023-08-04 RX ORDER — ZOLPIDEM TARTRATE 5 MG/1
5 TABLET ORAL
Qty: 30 TABLET | Refills: 0 | Status: SHIPPED | OUTPATIENT
Start: 2023-08-04

## 2023-08-08 DIAGNOSIS — I10 ESSENTIAL HYPERTENSION: ICD-10-CM

## 2023-08-09 RX ORDER — LISINOPRIL 10 MG/1
10 TABLET ORAL DAILY
Qty: 30 TABLET | Refills: 1 | Status: SHIPPED | OUTPATIENT
Start: 2023-08-09

## 2023-08-10 ENCOUNTER — TELEPHONE (OUTPATIENT)
Dept: GASTROENTEROLOGY | Facility: CLINIC | Age: 58
End: 2023-08-10

## 2023-08-10 DIAGNOSIS — I10 ESSENTIAL HYPERTENSION: ICD-10-CM

## 2023-08-10 RX ORDER — LISINOPRIL 10 MG/1
10 TABLET ORAL DAILY
Qty: 30 TABLET | Refills: 0 | OUTPATIENT
Start: 2023-08-10

## 2023-08-10 NOTE — TELEPHONE ENCOUNTER
Our mutual patient is scheduled for procedure:  colonoscopy/egd     On:09/11/23     With: DR NGO     Our office is requesting CARDIAC clearance for this procedure.      Physician Approving clearance: _______________________

## 2023-08-11 RX ORDER — GABAPENTIN 800 MG/1
800 TABLET ORAL 3 TIMES DAILY
Qty: 90 TABLET | Refills: 1 | Status: SHIPPED | OUTPATIENT
Start: 2023-08-11

## 2023-08-21 DIAGNOSIS — F41.1 GAD (GENERALIZED ANXIETY DISORDER): ICD-10-CM

## 2023-08-21 RX ORDER — BUSPIRONE HYDROCHLORIDE 7.5 MG/1
7.5 TABLET ORAL 2 TIMES DAILY
Qty: 60 TABLET | Refills: 0 | Status: SHIPPED | OUTPATIENT
Start: 2023-08-21

## 2023-08-21 RX ORDER — ALPRAZOLAM 0.5 MG/1
0.5 TABLET ORAL DAILY PRN
Qty: 30 TABLET | Refills: 0 | Status: SHIPPED | OUTPATIENT
Start: 2023-08-21 | End: 2023-08-21 | Stop reason: SDUPTHER

## 2023-08-21 NOTE — TELEPHONE ENCOUNTER
[facility] called and states the rx they received for Alprazolam has two different sets of instructions. Please advise which instructions are correct. Please resend or will call pharmacy once sig is confirmed.

## 2023-08-22 RX ORDER — ALPRAZOLAM 0.5 MG/1
0.5 TABLET ORAL DAILY PRN
Qty: 30 TABLET | Refills: 0 | Status: SHIPPED | OUTPATIENT
Start: 2023-08-22

## 2023-08-31 DIAGNOSIS — F51.01 PRIMARY INSOMNIA: ICD-10-CM

## 2023-08-31 RX ORDER — ZOLPIDEM TARTRATE 5 MG/1
5 TABLET ORAL
Qty: 30 TABLET | Refills: 0 | Status: CANCELLED | OUTPATIENT
Start: 2023-08-31

## 2023-09-01 RX ORDER — ZOLPIDEM TARTRATE 10 MG/1
10 TABLET ORAL
Qty: 30 TABLET | Refills: 0 | Status: SHIPPED | OUTPATIENT
Start: 2023-09-01

## 2023-09-02 DIAGNOSIS — F41.1 GAD (GENERALIZED ANXIETY DISORDER): ICD-10-CM

## 2023-09-02 RX ORDER — SERTRALINE HYDROCHLORIDE 100 MG/1
TABLET, FILM COATED ORAL
Qty: 45 TABLET | Refills: 3 | Status: SHIPPED | OUTPATIENT
Start: 2023-09-02

## 2023-09-11 ENCOUNTER — HOSPITAL ENCOUNTER (OUTPATIENT)
Dept: GASTROENTEROLOGY | Facility: AMBULARY SURGERY CENTER | Age: 58
Setting detail: OUTPATIENT SURGERY
Discharge: HOME | End: 2023-09-11
Admitting: PHYSICIAN ASSISTANT
Payer: COMMERCIAL

## 2023-09-11 ENCOUNTER — ANESTHESIA (OUTPATIENT)
Dept: GASTROENTEROLOGY | Facility: AMBULARY SURGERY CENTER | Age: 58
End: 2023-09-11

## 2023-09-11 ENCOUNTER — ANESTHESIA EVENT (OUTPATIENT)
Dept: GASTROENTEROLOGY | Facility: AMBULARY SURGERY CENTER | Age: 58
End: 2023-09-11

## 2023-09-11 VITALS
TEMPERATURE: 98.1 F | OXYGEN SATURATION: 100 % | WEIGHT: 267 LBS | BODY MASS INDEX: 45.58 KG/M2 | HEIGHT: 64 IN | DIASTOLIC BLOOD PRESSURE: 73 MMHG | SYSTOLIC BLOOD PRESSURE: 162 MMHG | RESPIRATION RATE: 18 BRPM | HEART RATE: 55 BPM

## 2023-09-11 DIAGNOSIS — D64.9 ANEMIA, UNSPECIFIED TYPE: ICD-10-CM

## 2023-09-11 PROCEDURE — 88305 TISSUE EXAM BY PATHOLOGIST: CPT | Performed by: PATHOLOGY

## 2023-09-11 RX ORDER — PROPOFOL 10 MG/ML
INJECTION, EMULSION INTRAVENOUS AS NEEDED
Status: DISCONTINUED | OUTPATIENT
Start: 2023-09-11 | End: 2023-09-11

## 2023-09-11 RX ORDER — SODIUM CHLORIDE, SODIUM LACTATE, POTASSIUM CHLORIDE, CALCIUM CHLORIDE 600; 310; 30; 20 MG/100ML; MG/100ML; MG/100ML; MG/100ML
INJECTION, SOLUTION INTRAVENOUS CONTINUOUS PRN
Status: DISCONTINUED | OUTPATIENT
Start: 2023-09-11 | End: 2023-09-11

## 2023-09-11 RX ORDER — DIPHENHYDRAMINE HYDROCHLORIDE 50 MG/ML
12.5 INJECTION INTRAMUSCULAR; INTRAVENOUS ONCE
Status: COMPLETED | OUTPATIENT
Start: 2023-09-11 | End: 2023-09-11

## 2023-09-11 RX ORDER — LIDOCAINE HYDROCHLORIDE 10 MG/ML
INJECTION, SOLUTION EPIDURAL; INFILTRATION; INTRACAUDAL; PERINEURAL AS NEEDED
Status: DISCONTINUED | OUTPATIENT
Start: 2023-09-11 | End: 2023-09-11

## 2023-09-11 RX ORDER — FENTANYL CITRATE 50 UG/ML
INJECTION, SOLUTION INTRAMUSCULAR; INTRAVENOUS AS NEEDED
Status: DISCONTINUED | OUTPATIENT
Start: 2023-09-11 | End: 2023-09-11

## 2023-09-11 RX ORDER — PROPOFOL 10 MG/ML
INJECTION, EMULSION INTRAVENOUS CONTINUOUS PRN
Status: DISCONTINUED | OUTPATIENT
Start: 2023-09-11 | End: 2023-09-11

## 2023-09-11 RX ADMIN — LIDOCAINE HYDROCHLORIDE 50 MG: 10 INJECTION, SOLUTION EPIDURAL; INFILTRATION; INTRACAUDAL at 08:35

## 2023-09-11 RX ADMIN — PROPOFOL 50 MG: 10 INJECTION, EMULSION INTRAVENOUS at 08:44

## 2023-09-11 RX ADMIN — SODIUM CHLORIDE, SODIUM LACTATE, POTASSIUM CHLORIDE, AND CALCIUM CHLORIDE: .6; .31; .03; .02 INJECTION, SOLUTION INTRAVENOUS at 08:18

## 2023-09-11 RX ADMIN — FENTANYL CITRATE 50 MCG: 50 INJECTION INTRAMUSCULAR; INTRAVENOUS at 08:31

## 2023-09-11 RX ADMIN — FENTANYL CITRATE 25 MCG: 50 INJECTION INTRAMUSCULAR; INTRAVENOUS at 08:55

## 2023-09-11 RX ADMIN — PROPOFOL 30 MG: 10 INJECTION, EMULSION INTRAVENOUS at 08:41

## 2023-09-11 RX ADMIN — PROPOFOL 150 MG: 10 INJECTION, EMULSION INTRAVENOUS at 08:35

## 2023-09-11 RX ADMIN — PROPOFOL 150 MCG/KG/MIN: 10 INJECTION, EMULSION INTRAVENOUS at 08:43

## 2023-09-11 RX ADMIN — DIPHENHYDRAMINE HYDROCHLORIDE 12.5 MG: 50 INJECTION, SOLUTION INTRAMUSCULAR; INTRAVENOUS at 10:11

## 2023-09-11 RX ADMIN — FENTANYL CITRATE 25 MCG: 50 INJECTION INTRAMUSCULAR; INTRAVENOUS at 08:48

## 2023-09-11 RX ADMIN — PROPOFOL 20 MG: 10 INJECTION, EMULSION INTRAVENOUS at 08:39

## 2023-09-11 NOTE — ANESTHESIA POSTPROCEDURE EVALUATION
Post-Op Assessment Note    CV Status:  Stable  Pain Score: 0    Pain management: adequate     Mental Status:  Alert and awake   Hydration Status:  Euvolemic   PONV Controlled:  Controlled   Airway Patency:  Patent   Two or more mitigation strategies used for obstructive sleep apnea   Post Op Vitals Reviewed: Yes      Staff: CRNA         No notable events documented.     /77 (09/11/23 0911) 117/71   Temp 98.1 °F (36.7 °C) (09/11/23 0911) 98.1   Pulse 58 (09/11/23 0911)   Resp 18 (09/11/23 0911)    SpO2 99 % (09/11/23 0911)

## 2023-09-11 NOTE — ANESTHESIA PREPROCEDURE EVALUATION
Procedure:  COLONOSCOPY  EGD    Relevant Problems   CARDIO   (+) Essential hypertension   (+) Mixed hyperlipidemia   (+) PAF (paroxysmal atrial fibrillation) (HCC)      HEMATOLOGY   (+) Coagulopathy (HCC)      NEURO/PSYCH   (+) Depression, recurrent (HCC)   (+) Dysthymic disorder        Physical Exam    Airway    Mallampati score: II         Dental       Cardiovascular  Cardiovascular exam normal    Pulmonary  Pulmonary exam normal     Other Findings        Anesthesia Plan  ASA Score- 3     Anesthesia Type- IV sedation with anesthesia with ASA Monitors. Additional Monitors:   Airway Plan:           Plan Factors-Exercise tolerance (METS): >4 METS. Induction- intravenous. Postoperative Plan-     Informed Consent- Anesthetic plan and risks discussed with patient. I personally reviewed this patient with the CRNA. Discussed and agreed on the Anesthesia Plan with the CRNA. Eduardo Cortes

## 2023-09-11 NOTE — H&P
History and Physical -  [facility]  Vidhya De La Rosa 59 y.o. female MRN: [id number]                  HPI: Vidhya De La Rosa is a 59y.o. year old female who presents for normocytic anemia      REVIEW OF SYSTEMS: Per the HPI, and otherwise unremarkable.     Historical Information   Past Medical History:   Diagnosis Date   • A-fib ([address])    • Allergic    • Anemia    • Anxiety    • Arthritis     Knees and back    • Depression    • Fall    • Headache(784.0)    • Hiatal hernia    • Hyperlipidemia    • Hypertension    • Morbid obesity ([address])    • Obesity    • Pneumonia      Past Surgical History:   Procedure Laterality Date   • BACK SURGERY  1998   • ESOPHAGOGASTRODUODENOSCOPY  10/17/2016   • HIATAL HERNIA REPAIR  10/17/2016    T Mariya Herrera DO      • LUMBAR [facility] SURGERY  05/1998    Approx   • PARTIAL GASTRECTOMY  10/17/2016    CIRO Herrera DO      • SPINE SURGERY      25 years ago   • VAGINAL DELIVERY      x1     Social History   Social History     Substance and Sexual Activity   Alcohol Use Yes    Comment: VERY LITTLE USE      Social History     Substance and Sexual Activity   Drug Use Never     Social History     Tobacco Use   Smoking Status Never   Smokeless Tobacco Never     Family History   Problem Relation Age of Onset   • Arthritis Mother    • COPD Mother    • Diabetes Mother    • Obesity Mother    • Coronary artery disease Mother    • Coronary artery disease Father    • Diabetes Father    • Heart attack Father    • Heart disease Father    • Obesity Father    • Alzheimer's disease Paternal Grandfather    • Dementia Paternal Grandfather        Meds/Allergies       Current Outpatient Medications:   •  ALPRAZolam (XANAX) 0.5 mg tablet  •  gabapentin (NEURONTIN) 800 mg tablet  •  busPIRone (BUSPAR) 7.5 mg tablet  •  flecainide (TAMBOCOR) 50 mg tablet  •  lisinopril (ZESTRIL) 10 mg tablet  •  metoprolol succinate (TOPROL-XL) 25 mg 24 hr tablet  •  semaglutide, 2 mg/dose, (Ozempic) 8 mg/ mL injection pen  •  sertraline (ZOLOFT) 100 mg tablet  •  Sodium Sulfate-Mag Sulfate-KCl (Sutab) 5278-697-185 MG TABS  •  Xarelto 20 MG tablet  •  zolpidem (AMBIEN) 10 mg tablet    No Known Allergies    Objective     There were no vitals taken for this visit. PHYSICAL EXAM    Gen: NAD  Head: NCAT  CV: RRR  CHEST: Clear  ABD: soft, NT/ND  EXT: no edema      ASSESSMENT/PLAN:  This is a 59y.o. year old female here for EGD and colonoscopy, and she is stable and optimized for her procedure.

## 2023-09-14 DIAGNOSIS — F51.01 PRIMARY INSOMNIA: ICD-10-CM

## 2023-09-14 DIAGNOSIS — F41.1 GAD (GENERALIZED ANXIETY DISORDER): ICD-10-CM

## 2023-09-14 PROCEDURE — 88305 TISSUE EXAM BY PATHOLOGIST: CPT | Performed by: PATHOLOGY

## 2023-09-14 RX ORDER — ZOLPIDEM TARTRATE 10 MG/1
10 TABLET ORAL
Qty: 30 TABLET | Refills: 0 | Status: CANCELLED | OUTPATIENT
Start: 2023-09-14

## 2023-09-15 RX ORDER — BUSPIRONE HYDROCHLORIDE 7.5 MG/1
7.5 TABLET ORAL 2 TIMES DAILY
Qty: 60 TABLET | Refills: 0 | Status: SHIPPED | OUTPATIENT
Start: 2023-09-15

## 2023-09-15 RX ORDER — ALPRAZOLAM 0.5 MG/1
0.5 TABLET ORAL DAILY PRN
Qty: 30 TABLET | Refills: 0 | Status: SHIPPED | OUTPATIENT
Start: 2023-09-15

## 2023-09-25 DIAGNOSIS — F51.01 PRIMARY INSOMNIA: ICD-10-CM

## 2023-09-26 RX ORDER — ZOLPIDEM TARTRATE 10 MG/1
TABLET ORAL
Qty: 30 TABLET | Refills: 0 | Status: SHIPPED | OUTPATIENT
Start: 2023-09-26

## 2023-09-29 DIAGNOSIS — I10 ESSENTIAL HYPERTENSION: ICD-10-CM

## 2023-09-29 RX ORDER — LISINOPRIL 10 MG/1
10 TABLET ORAL DAILY
Qty: 90 TABLET | Refills: 1 | Status: SHIPPED | OUTPATIENT
Start: 2023-09-29

## 2023-10-08 DIAGNOSIS — F41.1 GAD (GENERALIZED ANXIETY DISORDER): ICD-10-CM

## 2023-10-09 RX ORDER — BUSPIRONE HYDROCHLORIDE 7.5 MG/1
7.5 TABLET ORAL 2 TIMES DAILY
Qty: 60 TABLET | Refills: 0 | Status: SHIPPED | OUTPATIENT
Start: 2023-10-09

## 2023-10-09 RX ORDER — ALPRAZOLAM 0.5 MG/1
0.5 TABLET ORAL DAILY PRN
Qty: 30 TABLET | Refills: 0 | Status: SHIPPED | OUTPATIENT
Start: 2023-10-09

## 2023-10-09 NOTE — TELEPHONE ENCOUNTER
Medication: This medication cannot be delegated and will need to be either approved or denied by the ordering provider after review.      PDMP

## 2023-10-23 ENCOUNTER — PATIENT MESSAGE (OUTPATIENT)
Dept: FAMILY MEDICINE CLINIC | Facility: CLINIC | Age: 58
End: 2023-10-23

## 2023-10-23 DIAGNOSIS — F51.01 PRIMARY INSOMNIA: ICD-10-CM

## 2023-10-23 DIAGNOSIS — R73.01 IFG (IMPAIRED FASTING GLUCOSE): Primary | ICD-10-CM

## 2023-10-23 RX ORDER — ZOLPIDEM TARTRATE 10 MG/1
10 TABLET ORAL
Qty: 30 TABLET | Refills: 1 | Status: SHIPPED | OUTPATIENT
Start: 2023-10-23

## 2023-10-26 ENCOUNTER — RA CDI HCC (OUTPATIENT)
Dept: OTHER | Facility: HOSPITAL | Age: 58
End: 2023-10-26

## 2023-10-26 NOTE — PROGRESS NOTES
[facility] coding opportunities       Chart reviewed, no opportunity found: CHART REVIEWED, NO OPPORTUNITY FOUND        Patients Insurance        Commercial Insurance: [employer]

## 2023-11-01 ENCOUNTER — OFFICE VISIT (OUTPATIENT)
Dept: FAMILY MEDICINE CLINIC | Facility: CLINIC | Age: 58
End: 2023-11-01
Payer: COMMERCIAL

## 2023-11-01 ENCOUNTER — LAB (OUTPATIENT)
Dept: LAB | Facility: CLINIC | Age: 58
End: 2023-11-01
Payer: COMMERCIAL

## 2023-11-01 ENCOUNTER — HOSPITAL ENCOUNTER (OUTPATIENT)
Dept: RADIOLOGY | Facility: HOSPITAL | Age: 58
Discharge: HOME | End: 2023-11-01
Payer: COMMERCIAL

## 2023-11-01 VITALS
SYSTOLIC BLOOD PRESSURE: 118 MMHG | RESPIRATION RATE: 16 BRPM | HEIGHT: 64 IN | HEART RATE: 52 BPM | BODY MASS INDEX: 45.83 KG/M2 | TEMPERATURE: 97.7 F | DIASTOLIC BLOOD PRESSURE: 78 MMHG

## 2023-11-01 DIAGNOSIS — I10 ESSENTIAL HYPERTENSION: ICD-10-CM

## 2023-11-01 DIAGNOSIS — M79.671 RIGHT FOOT PAIN: Primary | ICD-10-CM

## 2023-11-01 DIAGNOSIS — M79.671 RIGHT FOOT PAIN: ICD-10-CM

## 2023-11-01 DIAGNOSIS — E66.01 MORBID OBESITY WITH BMI OF 40.0-44.9, ADULT (HCC): ICD-10-CM

## 2023-11-01 DIAGNOSIS — D64.9 ANEMIA, UNSPECIFIED TYPE: ICD-10-CM

## 2023-11-01 DIAGNOSIS — L98.9 SKIN LESIONS: ICD-10-CM

## 2023-11-01 LAB
ANION GAP SERPL CALCULATED.3IONS-SCNC: 7 MMOL/L
BASOPHILS # BLD AUTO: 0.06 THOUSANDS/ÂΜL (ref 0–0.1)
BASOPHILS NFR BLD AUTO: 1 % (ref 0–1)
BUN SERPL-MCNC: 21 MG/DL (ref 5–25)
CALCIUM SERPL-MCNC: 9.4 MG/DL (ref 8.4–10.2)
CHLORIDE SERPL-SCNC: 105 MMOL/L (ref 96–108)
CO2 SERPL-SCNC: 26 MMOL/L (ref 21–32)
CREAT SERPL-MCNC: 0.89 MG/DL (ref 0.6–1.3)
EOSINOPHIL # BLD AUTO: 0.15 THOUSAND/ÂΜL (ref 0–0.61)
EOSINOPHIL NFR BLD AUTO: 2 % (ref 0–6)
ERYTHROCYTE [DISTWIDTH] IN BLOOD BY AUTOMATED COUNT: 14.6 % (ref 11.6–15.1)
FERRITIN SERPL-MCNC: 23 NG/ML (ref 11–307)
GFR SERPL CREATININE-BSD FRML MDRD: 70 ML/MIN/1.73SQ M
GLUCOSE SERPL-MCNC: 87 MG/DL (ref 65–140)
HCT VFR BLD AUTO: 36.1 % (ref 34.8–46.1)
HGB BLD-MCNC: 11.6 G/DL (ref 11.5–15.4)
IMM GRANULOCYTES # BLD AUTO: 0.02 THOUSAND/UL (ref 0–0.2)
IMM GRANULOCYTES NFR BLD AUTO: 0 % (ref 0–2)
IRON SATN MFR SERPL: 10 % (ref 15–50)
IRON SERPL-MCNC: 40 UG/DL (ref 50–212)
LYMPHOCYTES # BLD AUTO: 2.81 THOUSANDS/ÂΜL (ref 0.6–4.47)
LYMPHOCYTES NFR BLD AUTO: 28 % (ref 14–44)
MCH RBC QN AUTO: 33 PG (ref 26.8–34.3)
MCHC RBC AUTO-ENTMCNC: 32.1 G/DL (ref 31.4–37.4)
MCV RBC AUTO: 103 FL (ref 82–98)
MONOCYTES # BLD AUTO: 0.79 THOUSAND/ÂΜL (ref 0.17–1.22)
MONOCYTES NFR BLD AUTO: 8 % (ref 4–12)
NEUTROPHILS # BLD AUTO: 6.12 THOUSANDS/ÂΜL (ref 1.85–7.62)
NEUTS SEG NFR BLD AUTO: 61 % (ref 43–75)
NRBC BLD AUTO-RTO: 0 /100 WBCS
PLATELET # BLD AUTO: 512 THOUSANDS/UL (ref 149–390)
PMV BLD AUTO: 9.4 FL (ref 8.9–12.7)
POTASSIUM SERPL-SCNC: 4.7 MMOL/L (ref 3.5–5.3)
RBC # BLD AUTO: 3.51 MILLION/UL (ref 3.81–5.12)
SODIUM SERPL-SCNC: 138 MMOL/L (ref 135–147)
TIBC SERPL-MCNC: 382 UG/DL (ref 250–450)
UIBC SERPL-MCNC: 342 UG/DL (ref 155–355)
VIT B12 SERPL-MCNC: 494 PG/ML (ref 180–914)
WBC # BLD AUTO: 9.95 THOUSAND/UL (ref 4.31–10.16)

## 2023-11-01 PROCEDURE — 83540 ASSAY OF IRON: CPT

## 2023-11-01 PROCEDURE — 99214 OFFICE O/P EST MOD 30 MIN: CPT | Performed by: FAMILY MEDICINE

## 2023-11-01 PROCEDURE — 73630 X-RAY EXAM OF FOOT: CPT

## 2023-11-01 PROCEDURE — 83550 IRON BINDING TEST: CPT

## 2023-11-01 PROCEDURE — 80048 BASIC METABOLIC PNL TOTAL CA: CPT

## 2023-11-01 PROCEDURE — 36415 COLL VENOUS BLD VENIPUNCTURE: CPT

## 2023-11-01 PROCEDURE — 82607 VITAMIN B-12: CPT

## 2023-11-01 PROCEDURE — 82728 ASSAY OF FERRITIN: CPT

## 2023-11-01 PROCEDURE — 85025 COMPLETE CBC W/AUTO DIFF WBC: CPT

## 2023-11-01 NOTE — PATIENT INSTRUCTIONS
NATE Segovia, [address] [place] [address] [address]  [facility], [facility]  [phone]    Refer to weight mgmt    Obtain labs

## 2023-11-01 NOTE — PROGRESS NOTES
Name: Mario Naranjo      : 1965      MRN: [id number]  Encounter Provider: Gina Talbot DO  Encounter Date: 2023   Encounter department: [address]     1. Right foot pain  Comments:  check xray given reproducible pain  refer to dr. Darryl Barnett  Orders:  -     XR foot 3+ vw right; Future; Expected date: 2023    2. Skin lesions  Comments:  requests routine derm referral  Orders:  -     Ambulatory Referral to Dermatology; Future    3. Essential hypertension  Comments:  at goal   continue current meds  obtain bmp  Orders:  -     Basic metabolic panel; Future    4. Morbid obesity with BMI of 40.0-44.9, adult ([address])  Comments:  refer to weight mgmt  Orders:  -     Ambulatory Referral to Weight Management; Future           Subjective      Ankle Swelling  The symptoms are aggravated by weight bearing. Pt c/o R>L foot pain x several months. Pain across medial arch. Sometimes radiates to ankle. Doesn't feel like plantar fasciitis. Occurs with weight bearing. Recalls no injury. On a/c.    Pt due for repeat cbc from GI and b12 from me. Blood pressure appropriate today. Due for bmp. No chest pain, shortness of breath, n/v, abd pain, paresthesias, weakness. Pt not losing weight even with ozempic. Wonders what else she can do. She is having trouble exercising due to knee issues, but she can't get knee surgery due to BMI.   +hx bariatric surgery    Review of Systems  See hpi; all   Current Outpatient Medications on File Prior to Visit   Medication Sig    ALPRAZolam (XANAX) 0.5 mg tablet TAKE ONE TABLET BY MOUTH EVERY DAY AS NEEDED FOR ANXIETY    busPIRone (BUSPAR) 7.5 mg tablet TAKE ONE TABLET BY MOUTH TWICE A DAY    flecainide (TAMBOCOR) 50 mg tablet TAKE ONE TABLET BY MOUTH TWICE A DAY    gabapentin (NEURONTIN) 800 mg tablet Take 1 tablet (800 mg total) by mouth 3 (three) times a day    lisinopril (ZESTRIL) 10 mg tablet TAKE 1 TABLET BY MOUTH DAILY metoprolol succinate (TOPROL-XL) 25 mg 24 hr tablet TAKE ONE TABLET BY MOUTH EVERY DAY    semaglutide, 2 mg/dose, (Ozempic) 8 mg/ mL injection pen Inject 0.75 mL (2 mg total) under the skin every 7 days    sertraline (ZOLOFT) 100 mg tablet TAKE 1 AND 1/2 TABLETS (150 MG TOTAL) BY MOUTH DAILY    Xarelto 20 MG tablet TAKE ONE TABLET BY MOUTH EVERY DAY    zolpidem (AMBIEN) 10 mg tablet Take 1 tablet (10 mg total) by mouth daily at bedtime as needed for sleep       Objective     /78   Pulse (!) 52   Temp 97.7 °F (36.5 °C) (Temporal)   Resp 16   Ht 5' 4" (1.626 m)   BMI 45.83 kg/m²     Physical Exam  Constitutional:       General: She is not in acute distress. Appearance: Normal appearance. She is well-developed. HENT:      Head: Normocephalic and atraumatic. Right Ear: Tympanic membrane, ear canal and external ear normal.      Left Ear: Tympanic membrane, ear canal and external ear normal.      Mouth/Throat:      Mouth: Mucous membranes are moist.      Pharynx: Oropharynx is clear. No posterior oropharyngeal erythema. Eyes:      Extraocular Movements: Extraocular movements intact. Conjunctiva/sclera: Conjunctivae normal.      Pupils: Pupils are equal, round, and reactive to light. Neck:      Thyroid: No thyromegaly. Vascular: No carotid bruit or JVD. Cardiovascular:      Rate and Rhythm: Normal rate and regular rhythm. Heart sounds: S1 normal and S2 normal. No murmur heard. No friction rub. No gallop. No S3 or S4 sounds. Pulmonary:      Effort: Pulmonary effort is normal.      Breath sounds: Normal breath sounds. No wheezing, rhonchi or rales. Abdominal:      General: Bowel sounds are normal.   Musculoskeletal:      Cervical back: Neck supple. Comments: Pain to palpation at medial aspect of the 1st metatarsal.  Flat arch   Lymphadenopathy:      Cervical: No cervical adenopathy. Neurological:      General: No focal deficit present.       Mental Status: She is alert and oriented to person, place, and time. Cranial Nerves: No cranial nerve deficit. Sensory: No sensory deficit. Deep Tendon Reflexes: Reflexes are normal and symmetric.  Reflexes normal.       Jaquan Squires, DO

## 2023-11-02 ENCOUNTER — TELEPHONE (OUTPATIENT)
Dept: ADMINISTRATIVE | Facility: OTHER | Age: 58
End: 2023-11-02

## 2023-11-02 NOTE — TELEPHONE ENCOUNTER
----- Message from Claudy Caraballo sent at 11/1/2023  4:13 PM EDT -----  11/01/23 4:13 PM    Hello, our patient Aravind Wilkins has had the shingrix completed/performed.  Please assist in updating the patient chart by making an External outreach to [facility].    Thank you,  Lucrecia Reis  [facility] Hernandez

## 2023-11-06 DIAGNOSIS — F41.1 GAD (GENERALIZED ANXIETY DISORDER): ICD-10-CM

## 2023-11-07 RX ORDER — BUSPIRONE HYDROCHLORIDE 7.5 MG/1
7.5 TABLET ORAL 2 TIMES DAILY
Qty: 60 TABLET | Refills: 0 | Status: SHIPPED | OUTPATIENT
Start: 2023-11-07

## 2023-11-07 RX ORDER — ALPRAZOLAM 0.5 MG/1
0.5 TABLET ORAL DAILY PRN
Qty: 30 TABLET | Refills: 0 | Status: SHIPPED | OUTPATIENT
Start: 2023-11-07

## 2023-11-10 NOTE — TELEPHONE ENCOUNTER
Upon review of the In Basket request and the patient's chart, initial outreach has been made via telephone call to facility. Please see Contacts section for details.      Thank you  Amy Harden MA Pulses equal bilaterally, no edema present.

## 2023-11-16 ENCOUNTER — VBI (OUTPATIENT)
Dept: ADMINISTRATIVE | Facility: OTHER | Age: 58
End: 2023-11-16

## 2023-11-21 NOTE — TELEPHONE ENCOUNTER
As a final attempt, a third outreach has been made via telephone call to facility. Please see Contacts section for details. This encounter will be closed and completed by end of day. Should we receive the requested information because of previous outreach attempts, the requested patient's chart will be updated appropriately.      Thank you  Isa Robb MA

## 2023-12-02 DIAGNOSIS — F41.1 GAD (GENERALIZED ANXIETY DISORDER): ICD-10-CM

## 2023-12-05 RX ORDER — BUSPIRONE HYDROCHLORIDE 7.5 MG/1
7.5 TABLET ORAL 2 TIMES DAILY
Qty: 60 TABLET | Refills: 0 | Status: SHIPPED | OUTPATIENT
Start: 2023-12-05

## 2023-12-05 RX ORDER — ALPRAZOLAM 0.5 MG/1
0.5 TABLET ORAL DAILY PRN
Qty: 30 TABLET | Refills: 0 | Status: SHIPPED | OUTPATIENT
Start: 2023-12-05

## 2023-12-19 DIAGNOSIS — F51.01 PRIMARY INSOMNIA: ICD-10-CM

## 2023-12-19 RX ORDER — ZOLPIDEM TARTRATE 10 MG/1
10 TABLET ORAL
Qty: 30 TABLET | Refills: 1 | Status: SHIPPED | OUTPATIENT
Start: 2023-12-19

## 2023-12-26 ENCOUNTER — CONSULT (OUTPATIENT)
Dept: BARIATRICS | Facility: CLINIC | Age: 58
End: 2023-12-26
Payer: COMMERCIAL

## 2023-12-26 VITALS
TEMPERATURE: 98.2 F | OXYGEN SATURATION: 97 % | DIASTOLIC BLOOD PRESSURE: 70 MMHG | WEIGHT: 268 LBS | BODY MASS INDEX: 45.75 KG/M2 | HEIGHT: 64 IN | SYSTOLIC BLOOD PRESSURE: 110 MMHG | HEART RATE: 68 BPM

## 2023-12-26 DIAGNOSIS — Z48.815 ENCOUNTER FOR SURGICAL AFTERCARE FOLLOWING SURGERY OF DIGESTIVE SYSTEM: Primary | ICD-10-CM

## 2023-12-26 DIAGNOSIS — K91.2 POSTSURGICAL MALABSORPTION: ICD-10-CM

## 2023-12-26 DIAGNOSIS — I48.0 PAF (PAROXYSMAL ATRIAL FIBRILLATION) (HCC): ICD-10-CM

## 2023-12-26 DIAGNOSIS — Z98.84 BARIATRIC SURGERY STATUS: ICD-10-CM

## 2023-12-26 DIAGNOSIS — E66.01 MORBID OBESITY WITH BMI OF 40.0-44.9, ADULT (HCC): ICD-10-CM

## 2023-12-26 PROCEDURE — 99203 OFFICE O/P NEW LOW 30 MIN: CPT | Performed by: PHYSICIAN ASSISTANT

## 2023-12-26 NOTE — PROGRESS NOTES
Assessment/Plan:     Patient ID: Lisa Tamayo is a 58 y.o. female.     Bariatric Surgery Status    -s/p Vertical Sleeve Gastrectomy with Dr Tello in 2016. Presents to the office today as new patient to establish care.    No major postop complications. Her initial weight was 340 lbs. LYNDA of 211 lbs about 2 years postop. Initially patient did very well and was following a healthy diet and exercise 4x a week. Around 2018 her mother and  passed away in addition to starting a new job and other stressors, and this was the year patient reports she started to gain weight due to falling off track. She endorses stress eating and continues to do so. She no longer exercises due to chronic pains.   She is currently on ozempic for several months [facility][note 3] and has been on 2 mg for a while now but states they will not increase her dose.     No complaints with regards to reflux or other issues at this time.   She had a screening EGD 9/2023 along with her colonoscopy showing:   IMPRESSION:  The esophagus, duodenal bulb, 1st part of the duodenum and 2nd part of the duodenum appeared normal.  The gastric suture site appeared normal.  Mild erythematous mucosa in the pylorus  Final Diagnosis   A. Duodenum, duodenal bx r/o celiac:  - Unremarkable duodenal mucosa  - No villous atrophy or increased intraepithelial lymphocytes seen      B. Stomach, gastric bx r/o h.pylori:  - Gastric oxyntic and antral type mucosa with mild chronic inflammation  - No alo shaped bacteria seen on H&E stained tissue section  - Negative for intestinal metaplasia and dysplasia       PAF  - on xarelto and metoprolol, followed by cardiology and well controled     She would like our [facility] team to start managing her ozempic. Will schedule appt with our medical side. Follow up 1 year surgical.    Continued/Maintain healthy weight loss with good nutrition intakes.  Adequate hydration with at least 64oz. fluid intake.  Follow diet as discussed.  Follow  vitamin and mineral recommendations as reviewed with you.  Exercise as tolerated.    Colonoscopy referral made: vivien  Mammo: ordered     Follow-up in 1 year and with medical weight management. We kindly ask that your arrive 15 minutes before your scheduled appointment time with your provider to allow our staff to room you, get your vital signs and update your chart.    Get lab work done prior to annual visit. Please call the office if you need a script.  It is recommended to check with your insurance BEFORE getting labs done to make sure they are covered by your policy.      Call our office if you have any problems with abdominal pain especially associated with fever, chills, nausea, vomiting or any other concerns.    All  Post-bariatric surgery patients should be aware that very small quantities of any alcohol can cause impairment and it is very possible not to feel the effect. The effect can be in the system for several hours.  It is also a stomach irritant.     It is advised to AVOID alcohol, Nonsteroidal antiinflammatory drugs (NSAIDS) and nicotine of all forms . Any of these can cause stomach irritation/pain.    Discussed the effects of alcohol on a bariatric patient and the increased impairment risk.     Keep up the good work!     Postsurgical Malabsorption   -At risk for malabsorption of vitamins/minerals secondary to malabsorption and restriction of intake from bariatric surgery  -Currently taking adequate postop bariatric surgery vitamin supplementation  -Next set of bariatric labs ordered for approximately 2 weeks  -Patient received education about the importance of adhering to a lifelong supplementation regimen to avoid vitamin/mineral deficiencies      Diagnoses and all orders for this visit:    Encounter for surgical aftercare following surgery of digestive system  -     Zinc; Future  -     Vitamin D 25 hydroxy; Future  -     Vitamin B12; Future  -     Vitamin B1, whole blood; Future  -     Vitamin A;  Future  -     PTH, intact; Future  -     CBC and Platelet; Future  -     Comprehensive metabolic panel; Future  -     Ferritin; Future  -     Folate; Future  -     TIBC Panel (incl. Iron, TIBC, % Iron Saturation); Future    Morbid obesity with BMI of 40.0-44.9, adult ([facility])  Comments:  refer to weight mgmt  Orders:  -     Ambulatory Referral to Weight Management  -     Zinc; Future  -     Vitamin D 25 hydroxy; Future  -     Vitamin B12; Future  -     Vitamin B1, whole blood; Future  -     Vitamin A; Future  -     PTH, intact; Future  -     CBC and Platelet; Future  -     Comprehensive metabolic panel; Future  -     Ferritin; Future  -     Folate; Future  -     TIBC Panel (incl. Iron, TIBC, % Iron Saturation); Future    Bariatric surgery status  -     Zinc; Future  -     Vitamin D 25 hydroxy; Future  -     Vitamin B12; Future  -     Vitamin B1, whole blood; Future  -     Vitamin A; Future  -     PTH, intact; Future  -     CBC and Platelet; Future  -     Comprehensive metabolic panel; Future  -     Ferritin; Future  -     Folate; Future  -     TIBC Panel (incl. Iron, TIBC, % Iron Saturation); Future    Postsurgical malabsorption  -     Zinc; Future  -     Vitamin D 25 hydroxy; Future  -     Vitamin B12; Future  -     Vitamin B1, whole blood; Future  -     Vitamin A; Future  -     PTH, intact; Future  -     CBC and Platelet; Future  -     Comprehensive metabolic panel; Future  -     Ferritin; Future  -     Folate; Future  -     TIBC Panel (incl. Iron, TIBC, % Iron Saturation); Future    PAF (paroxysmal atrial fibrillation) ([facility])  -     Zinc; Future  -     Vitamin D 25 hydroxy; Future  -     Vitamin B12; Future  -     Vitamin B1, whole blood; Future  -     Vitamin A; Future  -     PTH, intact; Future  -     CBC and Platelet; Future  -     Comprehensive metabolic panel; Future  -     Ferritin; Future  -     Folate; Future  -     TIBC Panel (incl. Iron, TIBC, % Iron Saturation); Future         Subjective:      Patient ID:  "Lisa Tamayo is a 58 y.o. female.    -s/p Vertical Sleeve Gastrectomy with Dr Tello in 2016. Presents to the office today as new patient to establish care.. Tolerating diet without issues; denies N/V, dysphagia, reflux.     Initial: 340  Current:268  EWL: (Weight loss is ahead of schedule at this post surgical period.)  Mohit: 211  Current BMI is Body mass index is 46 kg/m².    Tolerating a regular diet-yes  Eating at least 60 grams of protein per day-could improve   Drinking at least 64 ounces of fluid per day-could improve   Drinking carbonated beverages- a few times a week  Sufficient exercise-no  Using NSAIDs regularly-no  Using nicotine-no  Using alcohol-occasional   Supplements: Multivitamins    The following portions of the patient's history were reviewed and updated as appropriate: allergies, current medications, past family history, past medical history, past social history, past surgical history and problem list.    Review of Systems      Objective:    /70 (BP Location: Left arm, Patient Position: Sitting, Cuff Size: Large)   Pulse 68   Temp 98.2 °F (36.8 °C) (Tympanic)   Ht 5' 4\" (1.626 m)   Wt 122 kg (268 lb)   SpO2 97%   BMI 46.00 kg/m²      Physical Exam        "

## 2023-12-27 ENCOUNTER — PATIENT MESSAGE (OUTPATIENT)
Dept: FAMILY MEDICINE CLINIC | Facility: CLINIC | Age: 58
End: 2023-12-27

## 2023-12-27 DIAGNOSIS — I48.0 PAF (PAROXYSMAL ATRIAL FIBRILLATION) (HCC): ICD-10-CM

## 2023-12-27 RX ORDER — METOPROLOL SUCCINATE 25 MG/1
TABLET, EXTENDED RELEASE ORAL
Qty: 90 TABLET | Refills: 3 | Status: SHIPPED | OUTPATIENT
Start: 2023-12-27

## 2023-12-28 ENCOUNTER — PATIENT MESSAGE (OUTPATIENT)
Dept: FAMILY MEDICINE CLINIC | Facility: CLINIC | Age: 58
End: 2023-12-28

## 2023-12-28 DIAGNOSIS — M25.562 CHRONIC PAIN OF BOTH KNEES: ICD-10-CM

## 2023-12-28 DIAGNOSIS — G89.29 CHRONIC PAIN OF BOTH KNEES: ICD-10-CM

## 2023-12-28 DIAGNOSIS — M25.561 CHRONIC PAIN OF BOTH KNEES: ICD-10-CM

## 2023-12-29 ENCOUNTER — PATIENT MESSAGE (OUTPATIENT)
Dept: FAMILY MEDICINE CLINIC | Facility: CLINIC | Age: 58
End: 2023-12-29

## 2023-12-29 DIAGNOSIS — R73.01 IFG (IMPAIRED FASTING GLUCOSE): Primary | ICD-10-CM

## 2023-12-29 RX ORDER — GABAPENTIN 800 MG/1
800 TABLET ORAL 3 TIMES DAILY
Qty: 90 TABLET | Refills: 1 | Status: SHIPPED | OUTPATIENT
Start: 2023-12-29

## 2024-01-03 DIAGNOSIS — R73.01 IFG (IMPAIRED FASTING GLUCOSE): ICD-10-CM

## 2024-01-03 DIAGNOSIS — F41.1 GAD (GENERALIZED ANXIETY DISORDER): ICD-10-CM

## 2024-01-05 ENCOUNTER — TELEPHONE (OUTPATIENT)
Dept: FAMILY MEDICINE CLINIC | Facility: CLINIC | Age: 59
End: 2024-01-05

## 2024-01-05 DIAGNOSIS — E66.01 MORBID (SEVERE) OBESITY DUE TO EXCESS CALORIES (HCC): Primary | ICD-10-CM

## 2024-01-05 NOTE — TELEPHONE ENCOUNTER
Please initiate prior authorization for Mounjaro 2.5 mg    KEY:[id number]    Papers scanned into media section of chart

## 2024-01-05 NOTE — TELEPHONE ENCOUNTER
PA for tirzepatide (Mounjaro) 2.5 MG/0.5ML     Submitted via  [x][id number]  []Surescripts-Case ID #   []Faxed to plan   []Other website   []Phone call Case ID #     Office notes sent, clinical questions answered. Awaiting determination

## 2024-01-06 NOTE — TELEPHONE ENCOUNTER
1 [id number] 12/19/2023 12/19/2023 Zolpidem Tartrate (Tablet) 30.0 30 10 MG NA [id number] Commercial Insurance 0 / 1 PA    1 [id number] 12/05/2023 12/05/2023 ALPRAZolam (Tablet) 30.0 30 0.5 MG NA [id number] Commercial Insurance 0 / 0 PA    1 [id number] 11/21/2023 10/23/2023 Zolpidem Tartrate (Tablet) 30.0 30 10 MG NA [id number] Commercial Insurance 1 / 1 PA

## 2024-01-07 DIAGNOSIS — F41.1 GAD (GENERALIZED ANXIETY DISORDER): ICD-10-CM

## 2024-01-08 RX ORDER — ALPRAZOLAM 0.5 MG/1
0.5 TABLET ORAL DAILY PRN
Qty: 30 TABLET | Refills: 0 | Status: SHIPPED | OUTPATIENT
Start: 2024-01-08 | End: 2024-01-09 | Stop reason: SDUPTHER

## 2024-01-08 RX ORDER — SERTRALINE HYDROCHLORIDE 100 MG/1
150 TABLET, FILM COATED ORAL DAILY
Qty: 45 TABLET | Refills: 5 | Status: SHIPPED | OUTPATIENT
Start: 2024-01-08

## 2024-01-08 NOTE — TELEPHONE ENCOUNTER
PA for tirzepatide (Mounjaro) 2.5 MG/0.5ML Denied    Reason: Diagnosis submitted is not an FDA-approved indication.            Message sent to provider pool Yes    Denial letter scanned into Media Yesl    Appeal started No

## 2024-01-09 DIAGNOSIS — F51.01 PRIMARY INSOMNIA: ICD-10-CM

## 2024-01-09 DIAGNOSIS — F41.1 GAD (GENERALIZED ANXIETY DISORDER): ICD-10-CM

## 2024-01-09 RX ORDER — TIRZEPATIDE 2.5 MG/.5ML
2.5 INJECTION, SOLUTION SUBCUTANEOUS WEEKLY
Qty: 2 ML | Refills: 0 | Status: SHIPPED | OUTPATIENT
Start: 2024-01-09 | End: 2024-02-06

## 2024-01-10 ENCOUNTER — PATIENT MESSAGE (OUTPATIENT)
Dept: FAMILY MEDICINE CLINIC | Facility: CLINIC | Age: 59
End: 2024-01-10

## 2024-01-16 DIAGNOSIS — F41.1 GAD (GENERALIZED ANXIETY DISORDER): ICD-10-CM

## 2024-01-16 RX ORDER — ALPRAZOLAM 0.5 MG/1
0.5 TABLET ORAL DAILY PRN
Qty: 30 TABLET | Refills: 0 | Status: SHIPPED | OUTPATIENT
Start: 2024-01-16

## 2024-01-16 RX ORDER — ZOLPIDEM TARTRATE 10 MG/1
10 TABLET ORAL
Qty: 30 TABLET | Refills: 0 | Status: SHIPPED | OUTPATIENT
Start: 2024-01-16

## 2024-01-17 RX ORDER — BUSPIRONE HYDROCHLORIDE 7.5 MG/1
7.5 TABLET ORAL 2 TIMES DAILY
Qty: 60 TABLET | Refills: 0 | Status: SHIPPED | OUTPATIENT
Start: 2024-01-17

## 2024-01-30 ENCOUNTER — PATIENT MESSAGE (OUTPATIENT)
Dept: FAMILY MEDICINE CLINIC | Facility: CLINIC | Age: 59
End: 2024-01-30

## 2024-02-01 ENCOUNTER — TELEPHONE (OUTPATIENT)
Age: 59
End: 2024-02-01

## 2024-02-01 ENCOUNTER — TELEPHONE (OUTPATIENT)
Dept: DERMATOLOGY | Facility: CLINIC | Age: 59
End: 2024-02-01

## 2024-02-01 ENCOUNTER — OFFICE VISIT (OUTPATIENT)
Dept: FAMILY MEDICINE CLINIC | Facility: CLINIC | Age: 59
End: 2024-02-01
Payer: COMMERCIAL

## 2024-02-01 VITALS
RESPIRATION RATE: 16 BRPM | WEIGHT: 265 LBS | HEART RATE: 52 BPM | DIASTOLIC BLOOD PRESSURE: 80 MMHG | BODY MASS INDEX: 45.24 KG/M2 | TEMPERATURE: 97.2 F | HEIGHT: 64 IN | SYSTOLIC BLOOD PRESSURE: 132 MMHG | OXYGEN SATURATION: 99 %

## 2024-02-01 DIAGNOSIS — H60.391 OTITIS, EXTERNA, INFECTIVE, RIGHT: Primary | ICD-10-CM

## 2024-02-01 PROCEDURE — 99213 OFFICE O/P EST LOW 20 MIN: CPT | Performed by: FAMILY MEDICINE

## 2024-02-01 RX ORDER — OFLOXACIN 3 MG/ML
10 SOLUTION AURICULAR (OTIC) DAILY
Qty: 10 ML | Refills: 0 | Status: SHIPPED | OUTPATIENT
Start: 2024-02-01 | End: 2024-02-08

## 2024-02-01 NOTE — TELEPHONE ENCOUNTER
Called patient to advise her appointment with Dr. Barrett this afternoon needs to be rescheduled, as provider is out sick. Left a message for patient to call the office to reschedule for next available appt.

## 2024-02-01 NOTE — PROGRESS NOTES
"Assessment/Plan:       Problem List Items Addressed This Visit    None  Visit Diagnoses     Otitis, externa, infective, right    -  Primary    floxin otic x 7 d  avoid water in ear  supportive care for uri sx    Relevant Medications    ofloxacin (FLOXIN) 0.3 % otic solution               Subjective:     Lisa is a 58 y.o. female here today with chief complaint below:  Chief Complaint   Patient presents with   • Earache     R ear pain and pressure for the last few days. Denies any changes to hearing and drainage from the ear. Has not tried anything otc. Also c/o throat pain on the R side as well that started this morning.    • Sinusitis     C/o sinus pressure the last few days.      - CC above per clinical staff and reviewed.    HPI:  Pt here w concern of R ear pain and pressure.  Sinus pressure for several days.  Uses a headset at work- painful to use on the R side.   Hurts to push on the ear.  No drainage    No fever.     Took some cold medicine this morning.  Coughing at times from post nasal drip.     Some throat pain today.     No ill contacts.         The following portions of the patient's history were reviewed and updated as appropriate: allergies, current medications, past family history, past medical history, past social history, past surgical history and problem list.    ROS:  Review of Systems         /80   Pulse (!) 52   Temp (!) 97.2 °F (36.2 °C)   Resp 16   Ht 5' 4\" (1.626 m)   Wt 120 kg (265 lb)   SpO2 99%   BMI 45.49 kg/m²   BP Readings from Last 3 Encounters:   02/01/24 132/80   12/26/23 110/70   11/01/23 118/78     Wt Readings from Last 3 Encounters:   02/01/24 120 kg (265 lb)   12/26/23 122 kg (268 lb)   09/11/23 121 kg (267 lb)               Physical Exam:   Physical Exam  Vitals and nursing note reviewed.   Constitutional:       General: She is not in acute distress.     Appearance: Normal appearance. She is not ill-appearing or toxic-appearing.   HENT:      Head: Normocephalic and " atraumatic.      Right Ear: Tympanic membrane normal.      Left Ear: Tympanic membrane and ear canal normal.      Ears:      Comments: T canal edematous without dischage.  Tender canal, tender to tragus palpation     Nose: Congestion present.      Mouth/Throat:      Mouth: Mucous membranes are moist.   Eyes:      Conjunctiva/sclera: Conjunctivae normal.   Cardiovascular:      Rate and Rhythm: Normal rate and regular rhythm.   Pulmonary:      Effort: Pulmonary effort is normal.      Breath sounds: Normal breath sounds.   Lymphadenopathy:      Cervical: Cervical adenopathy (small mobile anterior cervical LN (pea sized)) present.   Neurological:      Mental Status: She is alert.

## 2024-02-01 NOTE — TELEPHONE ENCOUNTER
PA for tirzepatide (Zepbound) 2.5 mg/0.5 mL auto-injector Approved     Date(s) approved 12-1-2023 to 7-        Patient advised by [x] [place] Message                      [] Phone call       Pharmacy advised by [x]Fax                                     []Phone call    Approval letter scanned into Media Yes

## 2024-02-06 DIAGNOSIS — F51.01 PRIMARY INSOMNIA: ICD-10-CM

## 2024-02-06 DIAGNOSIS — F41.1 GAD (GENERALIZED ANXIETY DISORDER): ICD-10-CM

## 2024-02-07 RX ORDER — ALPRAZOLAM 0.5 MG/1
0.5 TABLET ORAL DAILY PRN
Qty: 30 TABLET | Refills: 0 | Status: SHIPPED | OUTPATIENT
Start: 2024-02-07

## 2024-02-07 RX ORDER — BUSPIRONE HYDROCHLORIDE 7.5 MG/1
7.5 TABLET ORAL 2 TIMES DAILY
Qty: 60 TABLET | Refills: 0 | Status: SHIPPED | OUTPATIENT
Start: 2024-02-07

## 2024-02-07 RX ORDER — ZOLPIDEM TARTRATE 10 MG/1
10 TABLET ORAL
Qty: 30 TABLET | Refills: 0 | Status: SHIPPED | OUTPATIENT
Start: 2024-02-07

## 2024-02-26 ENCOUNTER — PATIENT MESSAGE (OUTPATIENT)
Dept: FAMILY MEDICINE CLINIC | Facility: CLINIC | Age: 59
End: 2024-02-26

## 2024-02-26 DIAGNOSIS — E66.01 MORBID (SEVERE) OBESITY DUE TO EXCESS CALORIES (HCC): Primary | ICD-10-CM

## 2024-02-26 RX ORDER — TIRZEPATIDE 5 MG/.5ML
5 INJECTION, SOLUTION SUBCUTANEOUS WEEKLY
Qty: 2 ML | Refills: 0 | Status: SHIPPED | OUTPATIENT
Start: 2024-02-26

## 2024-03-04 DIAGNOSIS — F41.1 GAD (GENERALIZED ANXIETY DISORDER): ICD-10-CM

## 2024-03-04 DIAGNOSIS — F51.01 PRIMARY INSOMNIA: ICD-10-CM

## 2024-03-04 RX ORDER — SERTRALINE HYDROCHLORIDE 100 MG/1
150 TABLET, FILM COATED ORAL DAILY
Qty: 45 TABLET | Refills: 5 | Status: SHIPPED | OUTPATIENT
Start: 2024-03-04

## 2024-03-05 RX ORDER — ZOLPIDEM TARTRATE 10 MG/1
10 TABLET ORAL
Qty: 30 TABLET | Refills: 0 | Status: SHIPPED | OUTPATIENT
Start: 2024-03-05

## 2024-03-05 RX ORDER — BUSPIRONE HYDROCHLORIDE 7.5 MG/1
7.5 TABLET ORAL 2 TIMES DAILY
Qty: 60 TABLET | Refills: 3 | Status: SHIPPED | OUTPATIENT
Start: 2024-03-05

## 2024-03-05 RX ORDER — ALPRAZOLAM 0.5 MG/1
0.5 TABLET ORAL DAILY PRN
Qty: 30 TABLET | Refills: 0 | Status: SHIPPED | OUTPATIENT
Start: 2024-03-05

## 2024-03-10 DIAGNOSIS — I10 ESSENTIAL HYPERTENSION: ICD-10-CM

## 2024-03-10 DIAGNOSIS — G89.29 CHRONIC PAIN OF BOTH KNEES: ICD-10-CM

## 2024-03-10 DIAGNOSIS — F51.01 PRIMARY INSOMNIA: ICD-10-CM

## 2024-03-10 DIAGNOSIS — M25.562 CHRONIC PAIN OF BOTH KNEES: ICD-10-CM

## 2024-03-10 DIAGNOSIS — M25.561 CHRONIC PAIN OF BOTH KNEES: ICD-10-CM

## 2024-03-10 RX ORDER — ZOLPIDEM TARTRATE 10 MG/1
10 TABLET ORAL
Qty: 30 TABLET | Refills: 0 | Status: CANCELLED | OUTPATIENT
Start: 2024-03-10

## 2024-03-10 RX ORDER — GABAPENTIN 800 MG/1
800 TABLET ORAL 3 TIMES DAILY
Qty: 90 TABLET | Refills: 1 | Status: SHIPPED | OUTPATIENT
Start: 2024-03-10

## 2024-03-10 RX ORDER — LISINOPRIL 10 MG/1
10 TABLET ORAL DAILY
Qty: 90 TABLET | Refills: 1 | Status: SHIPPED | OUTPATIENT
Start: 2024-03-10

## 2024-03-28 DIAGNOSIS — G89.29 CHRONIC PAIN OF BOTH KNEES: ICD-10-CM

## 2024-03-28 DIAGNOSIS — F41.1 GAD (GENERALIZED ANXIETY DISORDER): ICD-10-CM

## 2024-03-28 DIAGNOSIS — M25.561 CHRONIC PAIN OF BOTH KNEES: ICD-10-CM

## 2024-03-28 DIAGNOSIS — M25.562 CHRONIC PAIN OF BOTH KNEES: ICD-10-CM

## 2024-03-28 DIAGNOSIS — E66.01 MORBID (SEVERE) OBESITY DUE TO EXCESS CALORIES (HCC): ICD-10-CM

## 2024-03-28 RX ORDER — TIRZEPATIDE 7.5 MG/.5ML
7.5 INJECTION, SOLUTION SUBCUTANEOUS WEEKLY
Qty: 2 ML | Refills: 0 | Status: SHIPPED | OUTPATIENT
Start: 2024-03-28

## 2024-03-28 RX ORDER — TIRZEPATIDE 5 MG/.5ML
5 INJECTION, SOLUTION SUBCUTANEOUS WEEKLY
Qty: 2 ML | Refills: 0 | Status: CANCELLED | OUTPATIENT
Start: 2024-03-28

## 2024-03-28 RX ORDER — GABAPENTIN 800 MG/1
800 TABLET ORAL 3 TIMES DAILY
Qty: 90 TABLET | Refills: 5 | Status: SHIPPED | OUTPATIENT
Start: 2024-03-28

## 2024-03-28 RX ORDER — ALPRAZOLAM 0.5 MG/1
0.5 TABLET ORAL DAILY PRN
Qty: 30 TABLET | Refills: 0 | Status: SHIPPED | OUTPATIENT
Start: 2024-03-28

## 2024-04-02 DIAGNOSIS — F51.01 PRIMARY INSOMNIA: ICD-10-CM

## 2024-04-02 DIAGNOSIS — F41.1 GAD (GENERALIZED ANXIETY DISORDER): ICD-10-CM

## 2024-04-03 RX ORDER — ALPRAZOLAM 0.5 MG/1
0.5 TABLET ORAL DAILY PRN
Qty: 30 TABLET | Refills: 0 | Status: SHIPPED | OUTPATIENT
Start: 2024-04-03

## 2024-04-03 RX ORDER — ZOLPIDEM TARTRATE 10 MG/1
TABLET ORAL
Qty: 30 TABLET | Refills: 0 | Status: SHIPPED | OUTPATIENT
Start: 2024-04-03

## 2024-04-28 DIAGNOSIS — F41.1 GAD (GENERALIZED ANXIETY DISORDER): ICD-10-CM

## 2024-04-28 DIAGNOSIS — E66.01 MORBID (SEVERE) OBESITY DUE TO EXCESS CALORIES (HCC): ICD-10-CM

## 2024-04-28 RX ORDER — BUSPIRONE HYDROCHLORIDE 7.5 MG/1
7.5 TABLET ORAL 2 TIMES DAILY
Qty: 60 TABLET | Refills: 0 | Status: SHIPPED | OUTPATIENT
Start: 2024-04-28

## 2024-04-29 RX ORDER — TIRZEPATIDE 7.5 MG/.5ML
7.5 INJECTION, SOLUTION SUBCUTANEOUS WEEKLY
Qty: 2 ML | Refills: 0 | Status: SHIPPED | OUTPATIENT
Start: 2024-04-29

## 2024-04-29 RX ORDER — ALPRAZOLAM 0.5 MG/1
0.5 TABLET ORAL DAILY PRN
Qty: 30 TABLET | Refills: 0 | Status: SHIPPED | OUTPATIENT
Start: 2024-04-29

## 2024-05-06 DIAGNOSIS — F51.01 PRIMARY INSOMNIA: ICD-10-CM

## 2024-05-06 RX ORDER — ZOLPIDEM TARTRATE 10 MG/1
10 TABLET ORAL
Qty: 30 TABLET | Refills: 0 | Status: SHIPPED | OUTPATIENT
Start: 2024-05-06

## 2024-05-30 ENCOUNTER — OFFICE VISIT (OUTPATIENT)
Dept: BARIATRICS | Facility: CLINIC | Age: 59
End: 2024-05-30
Payer: COMMERCIAL

## 2024-05-30 VITALS
WEIGHT: 272.4 LBS | BODY MASS INDEX: 48.27 KG/M2 | HEIGHT: 63 IN | TEMPERATURE: 97.9 F | RESPIRATION RATE: 16 BRPM | DIASTOLIC BLOOD PRESSURE: 86 MMHG | HEART RATE: 56 BPM | SYSTOLIC BLOOD PRESSURE: 140 MMHG

## 2024-05-30 DIAGNOSIS — R73.03 PREDIABETES: ICD-10-CM

## 2024-05-30 DIAGNOSIS — I10 ESSENTIAL HYPERTENSION: ICD-10-CM

## 2024-05-30 DIAGNOSIS — E78.2 MIXED HYPERLIPIDEMIA: ICD-10-CM

## 2024-05-30 DIAGNOSIS — E66.01 MORBID OBESITY WITH BMI OF 45.0-49.9, ADULT (HCC): Primary | ICD-10-CM

## 2024-05-30 PROCEDURE — 99203 OFFICE O/P NEW LOW 30 MIN: CPT | Performed by: INTERNAL MEDICINE

## 2024-05-30 RX ORDER — BUPROPION HYDROCHLORIDE 100 MG/1
100 TABLET, EXTENDED RELEASE ORAL 2 TIMES DAILY
Qty: 60 TABLET | Refills: 1 | Status: SHIPPED | OUTPATIENT
Start: 2024-05-30

## 2024-05-30 RX ORDER — TIRZEPATIDE 10 MG/.5ML
10 INJECTION, SOLUTION SUBCUTANEOUS WEEKLY
Qty: 2 ML | Refills: 0 | Status: SHIPPED | OUTPATIENT
Start: 2024-05-30 | End: 2024-06-27

## 2024-05-30 NOTE — PATIENT INSTRUCTIONS
General Recommendations:  Nutrition:  Eat breakfast daily.  Do not skip meals.     Food log (ie.) www.myfitnesspal.com, sparkpeople.com, loseit.com, calorieking.com, etc.    Practice mindful eating.  Be sure to set aside time to eat, eat slowly, and savor your food.    Hydration:    At least 64oz of water daily.  No sugar sweetened beverages.  No juice (eat the fruit instead).    Exercise:  Studies have shown that the ideal exercise goal is somewhere between 150 to 300 minutes of moderate intensity exercise a week.  Start with exercising 10 minutes every other day and gradually increase physical activity with a goal of at least 150 minutes of moderate intensity exercise a week, divided over at least 3 days a week.  An example of this would be exercising 30 minutes a day, 5 days a week.  Resistance training can increase muscle mass and increase our resting metabolic rate.   FULL BODY resistance training is recommended 2-3 times a week.  Do not do this on consecutive days to allow for muscle recovery.    Aim for a bare minimum 5000 steps, even on days you do not exercise.    Monitoring:   Weigh yourself daily.  If this causes undue stress, then just weigh yourself once a week.  Weigh yourself the same time of the day with the same amount of clothing on.  Preferably this should be done after waking up, before you eat, and with no clothing or minimal clothing on.    Specific Goals:  START HealthyCORE program    Food log (ie.) www.myfitnesspal.com,sparkpeople.com,loseit.com,calorieking.com,etc.    Do this at least 3 days prior to meeting with dietician.     No sugary beverages. At least 64oz of water daily.    Increase physical activity by 10 minutes daily    Increase Zepbound to 10mg weekly.  - Side effects of Zepbound include nausea, vomiting, diarrhea, or constipation. Keep an eye on your heart rate while on Zepbound. If you resting heart rate is greater than 100 beats per minutes, please notify me. If you develop  "severe abdominal pain, stop Zepbound and go to the emergency room, as that could be a sign of pancreatitis.      - Please notify me if you have surgery, upper endoscopy, or colonoscopy scheduled, as we typically hold Zepbound for one week prior to the procedure.       Start buproprion 100mg twice a day.  You are being started on \"contrave\" or on the generic versions of its components at approximated dosages.  If you develop abdominal upset, headache, dizziness, trouble sleeping, increased blood pressure, ormood change, then you must contact the office right away.  If you develop thoughts of harming yourself or others then stop the medication right away and go to the Emergency Room.      Nurse visit on a Thursday in 4 months  Return visit:  3 months in [place]    "

## 2024-05-30 NOTE — PROGRESS NOTES
Assessment/Plan:  Lisa was seen today for consult.    Diagnoses and all orders for this visit:    Morbid obesity with BMI of 45.0-49.9, adult (HCC)  -     buPROPion (Wellbutrin SR) 100 mg 12 hr tablet; Take 1 tablet (100 mg total) by mouth 2 (two) times a day  -     tirzepatide (Zepbound) 10 mg/0.5 mL auto-injector; Inject 0.5 mL (10 mg total) under the skin once a week for 28 days    Prediabetes  -     Hemoglobin A1C; Future    Essential hypertension    Mixed hyperlipidemia       - Discussed options of HealthyCORE-Intensive Lifestyle Intervention Program, Very Low Calorie Diet-VLCD, Conservative Program, and Revisional Surgery and the role of weight loss medications.  - Explained the importance of making lifestyle changes.  - Patient is interested in pursuing Conservative Program  - Initial weight loss goal of 5-10% weight loss for improved health as studies have shown this is where we see the greatest impact on improving health and decreasing risk of obesity related conditions.  - Weight loss can improve patient's co-morbid conditions and/or prevent weight-related complications.  - Labs reviewed: As below.      General Recommendations:  Nutrition:  Eat breakfast daily.  Do not skip meals.     Food log (ie.) www.StumbleUpon.com, sparkpeople.com, loseit.com, calorieking.com, etc.    Practice mindful eating.  Be sure to set aside time to eat, eat slowly, and savor your food.    Hydration:    At least 64oz of water daily.  No sugar sweetened beverages.  No juice (eat the fruit instead).    Exercise:  Studies have shown that the ideal exercise goal is somewhere between 150 to 300 minutes of moderate intensity exercise a week.  Start with exercising 10 minutes every other day and gradually increase physical activity with a goal of at least 150 minutes of moderate intensity exercise a week, divided over at least 3 days a week.  An example of this would be exercising 30 minutes a day, 5 days a week.  Resistance  "training can increase muscle mass and increase our resting metabolic rate.   FULL BODY resistance training is recommended 2-3 times a week.  Do not do this on consecutive days to allow for muscle recovery.    Aim for a bare minimum 5000 steps, even on days you do not exercise.    Monitoring:   Weigh yourself daily.  If this causes undue stress, then just weigh yourself once a week.  Weigh yourself the same time of the day with the same amount of clothing on.  Preferably this should be done after waking up, before you eat, and with no clothing or minimal clothing on.    Specific Goals:  START HealthyCORE program    Food log (ie.) www.[employer].com,sparkpeople.com,loseit.com,LiveSchool.com,etc.    Do this at least 3 days prior to meeting with dietician.     No sugary beverages. At least 64oz of water daily.    Increase physical activity by 10 minutes daily    Increase Zepbound to 10mg weekly.  - Side effects of Zepbound include nausea, vomiting, diarrhea, or constipation. Keep an eye on your heart rate while on Zepbound. If you resting heart rate is greater than 100 beats per minutes, please notify me. If you develop severe abdominal pain, stop Zepbound and go to the emergency room, as that could be a sign of pancreatitis.      - Please notify me if you have surgery, upper endoscopy, or colonoscopy scheduled, as we typically hold Zepbound for one week prior to the procedure.       Start buproprion 100mg twice a day.  You are being started on \"contrave\" or on the generic versions of its components at approximated dosages.  If you develop abdominal upset, headache, dizziness, trouble sleeping, increased blood pressure, ormood change, then you must contact the office right away.  If you develop thoughts of harming yourself or others then stop the medication right away and go to the Emergency Room.      Nurse visit on a Thursday in 4 months  Return visit:  3 months in [place]      Total time spent reviewing chart, " interviewing patient, examining patient, discussing plan, answering all questions, and documentin min.       ______________________________________________________________________        Subjective:   Chief Complaint   Patient presents with    Consult     MWM- Postop, Wt gain, Consult, GW 175lb- Stop bang       HPI: Lisa Tamayo  is a 58 y.o. female with history of s/p Vertical Sleeve Gastrectomy with Dr Tello in 2016  and excess weight, here to pursue weight loss management.  Previous notes and records have been reviewed.    HPI  Wt Readings from Last 20 Encounters:   24 124 kg (272 lb 6.4 oz)   24 120 kg (265 lb)   23 122 kg (268 lb)   23 121 kg (267 lb)   23 118 kg (260 lb)   22 118 kg (260 lb)   22 118 kg (260 lb)   20 104 kg (230 lb)   19 99.8 kg (220 lb)     Excess Weight:  Highest weight: 340  Lowest Weight: 211  Current weight: 272  Goal 175  Ozempic start weight 260 (23).  Stopped in  (weight 268lbs)   6 years ago her mother and  passed. Slowly started to regain at that time.  Previously on Ozempic at the highest dose with no effect.  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  Denies any hx of glaucoma, seizures, kidney  "stones, gallstones.  Denies Hx of CAD, PAD, palpitations, arrhythmia.   Denies uncontrolled anxiety or depression, suicidal behavior or thinking , insomnia or sleep disturbance.   Past Surgical History:   Procedure Laterality Date    BACK SURGERY  1998    ESOPHAGOGASTRODUODENOSCOPY  10/17/2016    HERNIA REPAIR  10/2016    HIATAL HERNIA REPAIR  10/17/2016    CIRO Tello DO       LUMBAR DISC SURGERY  05/1998    Approx    PARTIAL GASTRECTOMY  10/17/2016    CIRO Tello DO       SLEEVE GASTROPLASTY  10/2016    SPINE SURGERY      25 years ago    VAGINAL DELIVERY      x1     The following portions of the patient's history were reviewed and updated as appropriate: allergies, current medications, past family history, past medical history, past social history, past surgical history, and problem list.    Review Of Systems:  Review of Systems   Constitutional:  Negative for activity change, appetite change, fatigue and fever.   Respiratory:  Negative for cough and shortness of breath.    Cardiovascular:  Negative for chest pain, palpitations and leg swelling.   Gastrointestinal:  Negative for abdominal pain, constipation, diarrhea, nausea and vomiting.   Endocrine: Negative for cold intolerance and heat intolerance.   Genitourinary:  Negative for difficulty urinating and dysuria.   Musculoskeletal:  Negative for arthralgias, back pain and gait problem.   Skin:  Negative for pallor and rash.   Neurological:  Negative for headaches.   Psychiatric/Behavioral:  Negative for dysphoric mood, sleep disturbance and suicidal ideas (or HI). The patient is not nervous/anxious.        Objective:  /86   Pulse 56   Temp 97.9 °F (36.6 °C)   Resp 16   Ht 5' 2.5\" (1.588 m)   Wt 124 kg (272 lb 6.4 oz)   BMI 49.03 kg/m²   Physical Exam  Vitals and nursing note reviewed.   Constitutional:       General: She is not in acute distress.     Appearance: Normal appearance. She is not ill-appearing or diaphoretic.   Eyes:      " "General: No scleral icterus.  Cardiovascular:      Rate and Rhythm: Normal rate and regular rhythm.      Pulses: Normal pulses.      Heart sounds: No murmur heard.  Pulmonary:      Effort: Pulmonary effort is normal. No respiratory distress.      Breath sounds: Normal breath sounds. No wheezing or rhonchi.   Abdominal:      General: Bowel sounds are normal. There is no distension.      Palpations: Abdomen is soft. There is no mass.      Tenderness: There is no abdominal tenderness.   Musculoskeletal:      Cervical back: Neck supple.      Right lower leg: No edema.      Left lower leg: No edema.   Lymphadenopathy:      Cervical: No cervical adenopathy.   Skin:     Capillary Refill: Capillary refill takes less than 2 seconds.      Findings: No lesion or rash.   Neurological:      Mental Status: She is alert and oriented to person, place, and time.      Gait: Gait normal.   Psychiatric:         Mood and Affect: Mood normal.         Behavior: Behavior normal.         Labs and Imaging  Recent labs and imaging have been personally reviewed.  Lab Results   Component Value Date    WBC 9.95 11/01/2023    HGB 11.6 11/01/2023    HCT 36.1 11/01/2023     (H) 11/01/2023     (H) 11/01/2023     Lab Results   Component Value Date    SODIUM 138 11/01/2023    K 4.7 11/01/2023     11/01/2023    CO2 26 11/01/2023    AGAP 7 11/01/2023    BUN 21 11/01/2023    CREATININE 0.89 11/01/2023    GLUC 87 11/01/2023    CALCIUM 9.4 11/01/2023    AST 14 04/27/2023    ALT 13 04/27/2023    ALKPHOS 67 04/27/2023    TP 6.8 04/27/2023    TBILI 0.3 04/27/2023    EGFR 70 11/01/2023     Lab Results   Component Value Date    HGBA1C 5.7 (H) 04/27/2023     Lab Results   Component Value Date    TSH 3.54 04/27/2023     No results found for: \"CHOLESTEROL\"  No results found for: \"HDL\"  No results found for: \"TRIG\"  No results found for: \"LDLCALC\"    "

## 2024-05-31 DIAGNOSIS — F41.1 GAD (GENERALIZED ANXIETY DISORDER): ICD-10-CM

## 2024-05-31 DIAGNOSIS — F51.01 PRIMARY INSOMNIA: ICD-10-CM

## 2024-05-31 RX ORDER — BUSPIRONE HYDROCHLORIDE 7.5 MG/1
7.5 TABLET ORAL 2 TIMES DAILY
Qty: 60 TABLET | Refills: 5 | Status: SHIPPED | OUTPATIENT
Start: 2024-05-31

## 2024-05-31 RX ORDER — ALPRAZOLAM 0.5 MG/1
0.5 TABLET ORAL DAILY PRN
Qty: 30 TABLET | Refills: 0 | Status: SHIPPED | OUTPATIENT
Start: 2024-05-31

## 2024-05-31 RX ORDER — ZOLPIDEM TARTRATE 10 MG/1
10 TABLET ORAL
Qty: 30 TABLET | Refills: 0 | Status: SHIPPED | OUTPATIENT
Start: 2024-05-31

## 2024-06-03 ENCOUNTER — TELEPHONE (OUTPATIENT)
Dept: CARDIOLOGY CLINIC | Facility: CLINIC | Age: 59
End: 2024-06-03

## 2024-06-03 NOTE — TELEPHONE ENCOUNTER
Pt has a one year ovs on 8/5/24 at 4:20 with Dr. Sarah. Provider will not be in office: left vm asking pt to call back to reschedule~CD 6/3/24

## 2024-06-06 ENCOUNTER — TELEPHONE (OUTPATIENT)
Dept: CARDIOLOGY CLINIC | Facility: CLINIC | Age: 59
End: 2024-06-06

## 2024-06-06 NOTE — TELEPHONE ENCOUNTER
Pt has a one year ovs on 8/5/24 at 4:20 with Dr. Sarah. Provider will not be in office: left 2nd  asking pt to call back to reschedule

## 2024-06-09 DIAGNOSIS — M25.562 CHRONIC PAIN OF BOTH KNEES: ICD-10-CM

## 2024-06-09 DIAGNOSIS — M25.561 CHRONIC PAIN OF BOTH KNEES: ICD-10-CM

## 2024-06-09 DIAGNOSIS — G89.29 CHRONIC PAIN OF BOTH KNEES: ICD-10-CM

## 2024-06-09 RX ORDER — GABAPENTIN 800 MG/1
800 TABLET ORAL 3 TIMES DAILY
Qty: 90 TABLET | Refills: 1 | Status: SHIPPED | OUTPATIENT
Start: 2024-06-09

## 2024-06-10 ENCOUNTER — TELEPHONE (OUTPATIENT)
Dept: BARIATRICS | Facility: CLINIC | Age: 59
End: 2024-06-10

## 2024-06-19 DIAGNOSIS — E66.01 MORBID OBESITY WITH BMI OF 45.0-49.9, ADULT (HCC): Primary | ICD-10-CM

## 2024-06-19 RX ORDER — TIRZEPATIDE 12.5 MG/.5ML
12.5 INJECTION, SOLUTION SUBCUTANEOUS WEEKLY
Qty: 2 ML | Refills: 0 | Status: SHIPPED | OUTPATIENT
Start: 2024-06-19 | End: 2024-07-17

## 2024-06-20 ENCOUNTER — TELEPHONE (OUTPATIENT)
Dept: BARIATRICS | Facility: CLINIC | Age: 59
End: 2024-06-20

## 2024-06-26 DIAGNOSIS — E66.01 MORBID OBESITY WITH BMI OF 45.0-49.9, ADULT (HCC): ICD-10-CM

## 2024-06-26 DIAGNOSIS — M25.561 CHRONIC PAIN OF BOTH KNEES: ICD-10-CM

## 2024-06-26 DIAGNOSIS — G89.29 CHRONIC PAIN OF BOTH KNEES: ICD-10-CM

## 2024-06-26 DIAGNOSIS — F41.1 GAD (GENERALIZED ANXIETY DISORDER): ICD-10-CM

## 2024-06-26 DIAGNOSIS — M25.562 CHRONIC PAIN OF BOTH KNEES: ICD-10-CM

## 2024-06-26 DIAGNOSIS — F51.01 PRIMARY INSOMNIA: ICD-10-CM

## 2024-06-27 DIAGNOSIS — I48.0 PAF (PAROXYSMAL ATRIAL FIBRILLATION) (HCC): ICD-10-CM

## 2024-06-27 RX ORDER — ALPRAZOLAM 0.5 MG/1
0.5 TABLET ORAL DAILY PRN
Qty: 30 TABLET | Refills: 0 | Status: SHIPPED | OUTPATIENT
Start: 2024-06-27

## 2024-06-27 RX ORDER — RIVAROXABAN 20 MG/1
TABLET, FILM COATED ORAL
Qty: 90 TABLET | Refills: 0 | Status: SHIPPED | OUTPATIENT
Start: 2024-06-27

## 2024-06-27 RX ORDER — FLECAINIDE ACETATE 50 MG/1
TABLET ORAL
Qty: 180 TABLET | Refills: 0 | Status: SHIPPED | OUTPATIENT
Start: 2024-06-27

## 2024-06-27 RX ORDER — ZOLPIDEM TARTRATE 10 MG/1
10 TABLET ORAL
Qty: 30 TABLET | Refills: 0 | Status: SHIPPED | OUTPATIENT
Start: 2024-06-27

## 2024-06-27 RX ORDER — BUPROPION HYDROCHLORIDE 100 MG/1
100 TABLET, EXTENDED RELEASE ORAL 2 TIMES DAILY
Qty: 60 TABLET | Refills: 1 | Status: SHIPPED | OUTPATIENT
Start: 2024-06-27

## 2024-06-27 RX ORDER — GABAPENTIN 800 MG/1
800 TABLET ORAL 3 TIMES DAILY
Qty: 90 TABLET | Refills: 5 | Status: SHIPPED | OUTPATIENT
Start: 2024-06-27

## 2024-06-27 NOTE — TELEPHONE ENCOUNTER
Refill must be reviewed and completed by the office or provider. The refill is unable to be approved or denied by the medication management team.      Alprazolam and zolpidem cannot be delegated - Please review to see if the refill is appropriate.      This refill cannot be delegated

## 2024-06-28 NOTE — TELEPHONE ENCOUNTER
PA for Zepbound 10mg    Submitted via    [x]CMM-KEY- [id number]- PA Case ID: [id number]  []Surescripts-Case ID #   []Faxed to plan   []Other website   []Phone call Case ID #     Office notes sent, clinical questions answered. Awaiting determination    Turnaround time for your insurance to make a decision on your Prior Authorization can take 7-21 business days.

## 2024-07-18 ENCOUNTER — CONSULT (OUTPATIENT)
Dept: DERMATOLOGY | Facility: CLINIC | Age: 59
End: 2024-07-18
Payer: COMMERCIAL

## 2024-07-18 VITALS — TEMPERATURE: 97.7 F | BODY MASS INDEX: 45 KG/M2 | WEIGHT: 250 LBS

## 2024-07-18 DIAGNOSIS — L98.9 SKIN LESIONS: ICD-10-CM

## 2024-07-18 DIAGNOSIS — D22.71 MULTIPLE BENIGN MELANOCYTIC NEVI OF BOTH UPPER EXTREMITIES, BOTH LOWER EXTREMITIES, AND TRUNK: ICD-10-CM

## 2024-07-18 DIAGNOSIS — L81.4 SOLAR LENTIGO: ICD-10-CM

## 2024-07-18 DIAGNOSIS — D18.01 CHERRY ANGIOMA: ICD-10-CM

## 2024-07-18 DIAGNOSIS — D22.72 MULTIPLE BENIGN MELANOCYTIC NEVI OF BOTH UPPER EXTREMITIES, BOTH LOWER EXTREMITIES, AND TRUNK: ICD-10-CM

## 2024-07-18 DIAGNOSIS — L82.0 SEBORRHEIC KERATOSES, INFLAMED: ICD-10-CM

## 2024-07-18 DIAGNOSIS — Z12.83 SKIN EXAM, SCREENING FOR CANCER: Primary | ICD-10-CM

## 2024-07-18 DIAGNOSIS — D22.62 MULTIPLE BENIGN MELANOCYTIC NEVI OF BOTH UPPER EXTREMITIES, BOTH LOWER EXTREMITIES, AND TRUNK: ICD-10-CM

## 2024-07-18 DIAGNOSIS — L82.1 SEBORRHEIC KERATOSES: ICD-10-CM

## 2024-07-18 DIAGNOSIS — D22.61 MULTIPLE BENIGN MELANOCYTIC NEVI OF BOTH UPPER EXTREMITIES, BOTH LOWER EXTREMITIES, AND TRUNK: ICD-10-CM

## 2024-07-18 DIAGNOSIS — D22.5 MULTIPLE BENIGN MELANOCYTIC NEVI OF BOTH UPPER EXTREMITIES, BOTH LOWER EXTREMITIES, AND TRUNK: ICD-10-CM

## 2024-07-18 PROCEDURE — 99204 OFFICE O/P NEW MOD 45 MIN: CPT | Performed by: DERMATOLOGY

## 2024-07-18 PROCEDURE — 17110 DESTRUCTION B9 LES UP TO 14: CPT | Performed by: DERMATOLOGY

## 2024-07-18 NOTE — PROGRESS NOTES
"[facility] Note     Patient Name: Lisa Tamayo  Encounter Date: 7/18/2024     Have you been cared for by a [facility] Dermatologist in the last 3 years and, if so, which description applies to you?    NO.   I am considered a \"new\" patient and must complete all patient intake questions. I am FEMALE/of child-bearing potential.    REVIEW OF SYSTEMS:  Have you recently had or currently have any of the following? Recent fever or chills? No  Any non-healing wound? No  Are you pregnant or planning to become pregnant? No  Are you currently or planning to be nursing or breast feeding? No   PAST MEDICAL HISTORY:  Have you personally ever had or currently have any of the following?  If \"YES,\" then please provide more detail. Skin cancer (such as Melanoma, Basal Cell Carcinoma, Squamous Cell Carcinoma?  No  Tuberculosis, HIV/AIDS, Hepatitis B or C: No  Radiation Treatment No   HISTORY OF IMMUNOSUPPRESSION:   Do you have a history of any of the following:  Systemic Immunosuppression such as Diabetes, Biologic or Immunotherapy, Chemotherapy, Organ Transplantation, Bone Marrow Transplantation?  No    Answering \"YES\" requires the addition of the dotphrase \"IMMUNOSUPPRESSED\" as the first diagnosis of the patient's visit.   FAMILY HISTORY:  Any \"first degree relatives\" (parent, brother, sister, or child) with the following?    Skin Cancer, Pancreatic or Other Cancer? No   PATIENT EXPERIENCE:    Do you want the Dermatologist to perform a COMPLETE skin exam today including a clinical examination under the \"bra and underwear\" areas?  Yes  If necessary, do we have your permission to call and leave a detailed message on your Preferred Phone number that includes your specific medical information?  Yes      Allergies   Allergen Reactions    Fentanyl Itching      Current Outpatient Medications:     ALPRAZolam (XANAX) 0.5 mg tablet, Take 1 tablet (0.5 mg total) by mouth daily as needed for anxiety, Disp: 30 tablet, Rfl: " 0    buPROPion (Wellbutrin SR) 100 mg 12 hr tablet, Take 1 tablet (100 mg total) by mouth 2 (two) times a day, Disp: 60 tablet, Rfl: 1    busPIRone (BUSPAR) 7.5 mg tablet, Take 1 tablet (7.5 mg total) by mouth 2 (two) times a day, Disp: 60 tablet, Rfl: 5    flecainide (TAMBOCOR) 50 mg tablet, TAKE ONE TABLET BY MOUTH TWICE A DAY, Disp: 180 tablet, Rfl: 0    gabapentin (NEURONTIN) 800 mg tablet, Take 1 tablet (800 mg total) by mouth 3 (three) times a day, Disp: 90 tablet, Rfl: 5    lisinopril (ZESTRIL) 10 mg tablet, TAKE ONE TABLET BY MOUTH EVERY DAY, Disp: 90 tablet, Rfl: 1    metoprolol succinate (TOPROL-XL) 25 mg 24 hr tablet, TAKE ONE TABLET BY MOUTH EVERY DAY, Disp: 90 tablet, Rfl: 3    rivaroxaban (Xarelto) 20 mg tablet, TAKE ONE TABLET BY MOUTH EVERY DAY, Disp: 90 tablet, Rfl: 0    sertraline (ZOLOFT) 100 mg tablet, Take 1.5 tablets (150 mg total) by mouth daily, Disp: 45 tablet, Rfl: 5    zolpidem (AMBIEN) 10 mg tablet, Take 1 tablet (10 mg total) by mouth daily at bedtime as needed for sleep, Disp: 30 tablet, Rfl: 0        Whom besides the patient is providing clinical information about today's encounter?   NO ADDITIONAL HISTORIAN (patient alone provided history)    Physical Exam and Assessment/Plan by Diagnosis:    SEBORRHEIC KERATOSES  - Relevant exam: Scattered over the trunk/extremities are waxy brown to black plaques and papules with stuck on appearance and consistent dermoscopy  - Exam and clinical history consistent with seborrheic keratoses  - Counseled that these are benign growths that do not require treatment    MELANOCYTIC NEVI  -Relevant exam: Scattered over the trunk/extremities are homogenously pigmented brown macules and papules. ELM performed and without concerning findings. No outliers unless otherwise noted in today's note  - Exam and clinical history consistent with melanocytic nevi  - Counseled to return to clinic prior to scheduled appointment should any of these lesions change or  should any new lesions of concern arise  - Counseled on use of sun protection daily. Reviewed latest FDA sunscreen guidelines, including use of broad spectrum (UVA and UVB blocking) sunscreen or sun protective clothing with SPF 30-50 every 2-3 hours and reapplied after exposure to water    LENTIGINES  OTHER SKIN CHANGES DUE TO CHRONIC EXPOSURE TO NONIONIZING RADIATION  - Relevant exam: Over sun exposed areas are brown macules. ELM performed and without concerning findings.  - Exam and clinical history consistent with lentigines.  - Counseled to return to clinic prior to scheduled appointment should any of these lesions change or should any new lesions of concern arise.  - Recommended use of sunscreen as above and below.    CHERRY ANGIOMAS  - Relevant exam: Scattered over the trunk/extremities are red papules  - Exam and clinical history consistent with cherry angiomas  - Educated that these are benign    INFLAMED SEBORRHEIC KERATOSIS    Physical Exam:  Anatomic Location Affected & Morphological Description:  Waxy well demarcated sessile stuck on brown papule/s with erythema/crusting on the bra line on back and lower abdomen fold      Additional History of Present Condition:  Present for years, recently has become itching, painful, irritated, bleeding    Assessment and Plan:  Based on a thorough discussion of this condition and the management approach to it (including a comprehensive discussion of the known risks, side effects and potential benefits of treatment), the patient (family) agrees to implement the following specific plan:  Cryotherapy given symptoms and inflammation  After care discussed    PROCEDURE:  DESTRUCTION OF BENIGN LESIONS  After a thorough discussion of treatment options and risk/benefits/alternatives (including but not limited to local pain, scarring, dyspigmentation, blistering, and possible superinfection), verbal and written consent were obtained and the aforementioned lesions were treated on  "with cryotherapy using liquid nitrogen x 1 cycle for 5-10 seconds.    TOTAL NUMBER of 13 lesions were treated today on the ANATOMIC LOCATION: bra line on back x12, lower abdomen fold x1    The patient tolerated the procedure well, and after-care instructions were provided.    STATIS DERMATITIS (\"VENUS ECZEMA\")    Physical Exam:  Anatomic Location Affected:  left lower leg  Morphological Description:  light brown to erythematous chronic appearing stasis changes on lower leg/ankle extending to mid shin. No active erythema/inflammation      Additional History of Present Condition: Patient suffered a fall at [facility] resulting in 2 week hospitalization. had a fall and leg took a while to heal. No she notes there is hyperpigmentation. She would like to make sure there is nothing wrong with the skin so she can have knee replacement.    Assessment and Plan:  Based on a thorough discussion of this condition and the management approach to it (including a comprehensive discussion of the known risks, side effects and potential benefits of treatment), the patient (family) agrees to implement the following specific plan:  Reassure benign. Advised should not cause any problems for surgery.  For any active episodes of inflammation, she can contact me for steroid    What is venous eczema?  Venous eczema is a common form of eczema/dermatitis that affects one or both lower legs in association with venous insufficiency. It is also called gravitational dermatitis.    Who gets venous eczema?  Venous eczema is most often seen in middle-aged and elderly patients -- it is reported to affect 20% of those over 70 years. It is associated with:  History of deep venous thrombosis in an affected limb  History of cellulitis in an affected limb  Chronic swelling of the lower leg, aggravated by hot weather and prolonged standing  Varicose veins  Venous leg ulcers.    What causes venous eczema?  Venous eczema appears to be due to fluid collecting in the " tissues and activation of the innate immune response.    Normally during walking the leg muscles pump blood upwards and valves in the veins prevent pooling. A clot in the deep leg veins (deep venous thrombosis or DVT) or varicose veins may damage the valves. As a result back pressure develops and fluid collects in the tissues. An inflammatory reaction occurs.    What are the clinical features of venous eczema?  Venous eczema can form discrete patches or become confluent and circumferential. Features include:  Itchy red, blistered and crusted plaques; or dry fissured and scaly plaques on one or both lower legs  Orange-brown macular pigmentation due to haemosiderin deposition  Atrophie myles (white irregular scars surrounded by red spots)  'Champagne bottle' shape of the lower leg -- narrowing at the ankles and induration (lipodermatosclerosis)    What are the complications of venous eczema?  Impetiginisation -- secondary infection with Staphylococcus aureus resulting in yellowish crusts  Cellulitis -- infection with Streptococcus pyogenes: there may be redness, swelling, pain, fever, a red streak up the leg and swollen nodes in the groin  Secondary eczema -- the eczema spreads to other areas on the body  Contact allergy to one or more components of the ointments or creams used    How is venous eczema diagnosed?  The diagnosis of venous eczema is clinical.  Patch tests may be undertaken if there is suspicion of contact allergy.    What is the treatment for venous eczema?  Reduce swelling in the leg  Don't stand for long periods.  Take regular walks.  Elevate your feet when sitting: if your legs are swollen they need to be above your hips to drain effectively.  Elevate the foot of your bed overnight.  During the acute phase of eczema, bandaging is important to reduce swelling.  When eczema has settled, wear graduated compression socks or stockings long term. Fitted moderate to high compression socks can be obtained  from a surgical supplies company. Light compression using travel socks may be adequate, and these are easy to put on. They can be bought at pharmacies, travel and sports stores. More compression is obtained by wearing two pairs.  Horse chestnut extract appears to be of benefit for at least some patients with venous disease.    Treat the eczema  Dry up oozing patches with Condy's solution (potassium permanganate) or dilute vinegar on gauze as compresses.  Oral antibiotics such as flucloxacillin may be prescribed for a secondary infection.  Apply a prescribed topical steroid: start with a potent steroid cream applied accurately daily to the patches until they have flattened out. After a few days, change to a milder steroid cream (eg. hydrocortisone) until the itchy patches have resolved (maintenance treatment). Check with your doctor if you are using steroid creams for more than a few weeks. Overuse can thin the skin, but short courses of stronger preparations can be used from time to time if necessary to control dermatitis. Coal tar ointment may also help.  Use a moisturizing cream frequently to keep the skin on the legs smooth and soft. If the skin is very scaly, urea cream may be especially effective.  Protect your skin from injury: this can result in infection or ulceration that may be difficult to heal.    Treatment for varicose veins  Seek the opinion of a vascular surgeon regarding varicose veins  These can be treated surgically or sclerotherapy.  Varicose veins may develop again after an apparently successful operation because venous disease is progressive.    How can venous eczema be prevented?  Venous eczema cannot be completely prevented but the number and severity of flare-ups can be reduced by the following measures.  Avoid prolonged standing or sitting with legs down  Wear compression socks or stockings  Avoid and treat leg swelling  Apply emollients frequently and regularly to dry skin  Avoid soap; use  water alone or non-soap cleansers when bathing    What is the outlook for venous eczema?  Venous eczema tends to be a recurring or chronic disorder lifelong. Treat recurrence promptly with topical steroids.      Scribe Attestation      I,:  Karol Valentin am acting as a scribe while in the presence of the attending physician.:       I,:  Donna Barrett MD personally performed the services described in this documentation    as scribed in my presence.:

## 2024-07-18 NOTE — PATIENT INSTRUCTIONS
"SEBORRHEIC KERATOSES  - Relevant exam: Scattered over the trunk/extremities are waxy brown to black plaques and papules with stuck on appearance and consistent dermoscopy  - Exam and clinical history consistent with seborrheic keratoses  - Counseled that these are benign growths that do not require treatment    MELANOCYTIC NEVI  -Relevant exam: Scattered over the trunk/extremities are homogenously pigmented brown macules and papules. ELM performed and without concerning findings. No outliers unless otherwise noted in today's note  - Exam and clinical history consistent with melanocytic nevi  - Counseled to return to clinic prior to scheduled appointment should any of these lesions change or should any new lesions of concern arise  - Counseled on use of sun protection daily. Reviewed latest FDA sunscreen guidelines, including use of broad spectrum (UVA and UVB blocking) sunscreen or sun protective clothing with SPF 30-50 every 2-3 hours and reapplied after exposure to water    LENTIGINES  OTHER SKIN CHANGES DUE TO CHRONIC EXPOSURE TO NONIONIZING RADIATION  - Relevant exam: Over sun exposed areas are brown macules. ELM performed and without concerning findings.  - Exam and clinical history consistent with lentigines.  - Counseled to return to clinic prior to scheduled appointment should any of these lesions change or should any new lesions of concern arise.  - Recommended use of sunscreen as above and below.    CHERRY ANGIOMAS  - Relevant exam: Scattered over the trunk/extremities are red papules  - Exam and clinical history consistent with cherry angiomas  - Educated that these are benign    STATIS DERMATITIS (\"VENUS ECZEMA\")    Assessment and Plan:  Based on a thorough discussion of this condition and the management approach to it (including a comprehensive discussion of the known risks, side effects and potential benefits of treatment), the patient (family) agrees to implement the following specific plan:  Reassure " benign. Advised should not cause any problems for surgery.    What is venous eczema?  Venous eczema is a common form of eczema/dermatitis that affects one or both lower legs in association with venous insufficiency. It is also called gravitational dermatitis.    Who gets venous eczema?  Venous eczema is most often seen in middle-aged and elderly patients -- it is reported to affect 20% of those over 70 years. It is associated with:  History of deep venous thrombosis in an affected limb  History of cellulitis in an affected limb  Chronic swelling of the lower leg, aggravated by hot weather and prolonged standing  Varicose veins  Venous leg ulcers.    What causes venous eczema?  Venous eczema appears to be due to fluid collecting in the tissues and activation of the innate immune response.    Normally during walking the leg muscles pump blood upwards and valves in the veins prevent pooling. A clot in the deep leg veins (deep venous thrombosis or DVT) or varicose veins may damage the valves. As a result back pressure develops and fluid collects in the tissues. An inflammatory reaction occurs.    What are the clinical features of venous eczema?  Venous eczema can form discrete patches or become confluent and circumferential. Features include:  Itchy red, blistered and crusted plaques; or dry fissured and scaly plaques on one or both lower legs  Orange-brown macular pigmentation due to haemosiderin deposition  Atrophie myles (white irregular scars surrounded by red spots)  'Champagne bottle' shape of the lower leg -- narrowing at the ankles and induration (lipodermatosclerosis)    What are the complications of venous eczema?  Impetiginisation -- secondary infection with Staphylococcus aureus resulting in yellowish crusts  Cellulitis -- infection with Streptococcus pyogenes: there may be redness, swelling, pain, fever, a red streak up the leg and swollen nodes in the groin  Secondary eczema -- the eczema spreads to  other areas on the body  Contact allergy to one or more components of the ointments or creams used    How is venous eczema diagnosed?  The diagnosis of venous eczema is clinical.  Patch tests may be undertaken if there is suspicion of contact allergy.    What is the treatment for venous eczema?  Reduce swelling in the leg  Don't stand for long periods.  Take regular walks.  Elevate your feet when sitting: if your legs are swollen they need to be above your hips to drain effectively.  Elevate the foot of your bed overnight.  During the acute phase of eczema, bandaging is important to reduce swelling.  When eczema has settled, wear graduated compression socks or stockings long term. Fitted moderate to high compression socks can be obtained from a surgical supplies company. Light compression using travel socks may be adequate, and these are easy to put on. They can be bought at pharmacies, travel and sports stores. More compression is obtained by wearing two pairs.  Horse chestnut extract appears to be of benefit for at least some patients with venous disease.    Treat the eczema  Dry up oozing patches with Condy's solution (potassium permanganate) or dilute vinegar on gauze as compresses.  Oral antibiotics such as flucloxacillin may be prescribed for a secondary infection.  Apply a prescribed topical steroid: start with a potent steroid cream applied accurately daily to the patches until they have flattened out. After a few days, change to a milder steroid cream (eg. hydrocortisone) until the itchy patches have resolved (maintenance treatment). Check with your doctor if you are using steroid creams for more than a few weeks. Overuse can thin the skin, but short courses of stronger preparations can be used from time to time if necessary to control dermatitis. Coal tar ointment may also help.  Use a moisturizing cream frequently to keep the skin on the legs smooth and soft. If the skin is very scaly, urea cream may be  especially effective.  Protect your skin from injury: this can result in infection or ulceration that may be difficult to heal.    Treatment for varicose veins  Seek the opinion of a vascular surgeon regarding varicose veins  These can be treated surgically or sclerotherapy.  Varicose veins may develop again after an apparently successful operation because venous disease is progressive.    How can venous eczema be prevented?  Venous eczema cannot be completely prevented but the number and severity of flare-ups can be reduced by the following measures.  Avoid prolonged standing or sitting with legs down  Wear compression socks or stockings  Avoid and treat leg swelling  Apply emollients frequently and regularly to dry skin  Avoid soap; use water alone or non-soap cleansers when bathing    What is the outlook for venous eczema?  Venous eczema tends to be a recurring or chronic disorder lifelong. Treat recurrence promptly with topical steroids.    INFLAMED SEBORRHEIC KERATOSIS     Physical Exam:  Anatomic Location Affected & Morphological Description:  Waxy well demarcated sessile stuck on brown papule/s with erythema/crusting on the bra line on back and lower abdomen fold        Additional History of Present Condition:  Present for years, recently has become itching, painful, irritated, bleeding     Assessment and Plan:  Based on a thorough discussion of this condition and the management approach to it (including a comprehensive discussion of the known risks, side effects and potential benefits of treatment), the patient (family) agrees to implement the following specific plan:  Cryotherapy given symptoms and inflammation  After care discussed     PROCEDURE:  DESTRUCTION OF BENIGN LESIONS  After a thorough discussion of treatment options and risk/benefits/alternatives (including but not limited to local pain, scarring, dyspigmentation, blistering, and possible superinfection), verbal and written consent were obtained  and the aforementioned lesions were treated on with cryotherapy using liquid nitrogen x 1 cycle for 5-10 seconds.     TOTAL NUMBER of 13 lesions were treated today on the ANATOMIC LOCATION: bra line on back x12, lower abdomen fold x1     The patient tolerated the procedure well, and after-care instructions were provided.

## 2024-07-19 ENCOUNTER — PATIENT MESSAGE (OUTPATIENT)
Dept: FAMILY MEDICINE CLINIC | Facility: CLINIC | Age: 59
End: 2024-07-19

## 2024-07-19 DIAGNOSIS — E66.01 MORBID (SEVERE) OBESITY DUE TO EXCESS CALORIES (HCC): Primary | ICD-10-CM

## 2024-07-22 RX ORDER — TIRZEPATIDE 15 MG/.5ML
15 INJECTION, SOLUTION SUBCUTANEOUS WEEKLY
Qty: 6 ML | Refills: 0 | Status: SHIPPED | OUTPATIENT
Start: 2024-07-22

## 2024-07-28 DIAGNOSIS — F41.1 GAD (GENERALIZED ANXIETY DISORDER): ICD-10-CM

## 2024-07-28 DIAGNOSIS — F51.01 PRIMARY INSOMNIA: ICD-10-CM

## 2024-07-28 DIAGNOSIS — E66.01 MORBID OBESITY WITH BMI OF 45.0-49.9, ADULT (HCC): ICD-10-CM

## 2024-07-28 DIAGNOSIS — Z00.00 PHYSICAL EXAM, ANNUAL: Primary | ICD-10-CM

## 2024-07-29 ENCOUNTER — TELEPHONE (OUTPATIENT)
Dept: BARIATRICS | Facility: CLINIC | Age: 59
End: 2024-07-29

## 2024-07-29 RX ORDER — BUPROPION HYDROCHLORIDE 100 MG/1
100 TABLET, EXTENDED RELEASE ORAL 2 TIMES DAILY
Qty: 60 TABLET | Refills: 1 | Status: SHIPPED | OUTPATIENT
Start: 2024-07-29

## 2024-07-29 RX ORDER — BUPROPION HYDROCHLORIDE 100 MG/1
100 TABLET, EXTENDED RELEASE ORAL 2 TIMES DAILY
Qty: 60 TABLET | Refills: 0 | Status: SHIPPED | OUTPATIENT
Start: 2024-07-29

## 2024-08-01 NOTE — TELEPHONE ENCOUNTER
Please review and advise if refill is appropriate. Patient does not have a follow-up visit scheduled.    FYI, there are two encounters regarding this.

## 2024-08-02 RX ORDER — ZOLPIDEM TARTRATE 10 MG/1
10 TABLET ORAL
Qty: 30 TABLET | Refills: 0 | Status: SHIPPED | OUTPATIENT
Start: 2024-08-02

## 2024-08-02 RX ORDER — ALPRAZOLAM 0.5 MG/1
0.5 TABLET ORAL DAILY PRN
Qty: 30 TABLET | Refills: 0 | Status: SHIPPED | OUTPATIENT
Start: 2024-08-02

## 2024-08-22 ENCOUNTER — VBI (OUTPATIENT)
Dept: ADMINISTRATIVE | Facility: OTHER | Age: 59
End: 2024-08-22

## 2024-08-22 NOTE — TELEPHONE ENCOUNTER
08/22/24 11:28 AM     Chart reviewed for CRC: Colonoscopy, Diabetic Eye Exam, and Mammogram ; nothing is submitted to the patient's insurance at this time.     Miguel A Elliott MA   [facility]

## 2024-08-25 DIAGNOSIS — G89.29 CHRONIC PAIN OF BOTH KNEES: ICD-10-CM

## 2024-08-25 DIAGNOSIS — E66.01 MORBID (SEVERE) OBESITY DUE TO EXCESS CALORIES (HCC): ICD-10-CM

## 2024-08-25 DIAGNOSIS — M25.562 CHRONIC PAIN OF BOTH KNEES: ICD-10-CM

## 2024-08-25 DIAGNOSIS — F41.1 GAD (GENERALIZED ANXIETY DISORDER): ICD-10-CM

## 2024-08-25 DIAGNOSIS — F51.01 PRIMARY INSOMNIA: ICD-10-CM

## 2024-08-25 DIAGNOSIS — M25.561 CHRONIC PAIN OF BOTH KNEES: ICD-10-CM

## 2024-08-25 RX ORDER — GABAPENTIN 800 MG/1
800 TABLET ORAL 3 TIMES DAILY
Qty: 90 TABLET | Refills: 5 | Status: SHIPPED | OUTPATIENT
Start: 2024-08-25

## 2024-08-25 RX ORDER — TIRZEPATIDE 15 MG/.5ML
15 INJECTION, SOLUTION SUBCUTANEOUS WEEKLY
Qty: 6 ML | Refills: 0 | Status: SHIPPED | OUTPATIENT
Start: 2024-08-25

## 2024-08-26 RX ORDER — ZOLPIDEM TARTRATE 10 MG/1
10 TABLET ORAL
Qty: 30 TABLET | Refills: 0 | Status: SHIPPED | OUTPATIENT
Start: 2024-08-26

## 2024-08-26 RX ORDER — ALPRAZOLAM 0.5 MG
0.5 TABLET ORAL DAILY PRN
Qty: 30 TABLET | Refills: 0 | Status: SHIPPED | OUTPATIENT
Start: 2024-08-26

## 2024-08-28 ENCOUNTER — APPOINTMENT (EMERGENCY)
Dept: CT IMAGING | Facility: HOSPITAL | Age: 59
End: 2024-08-28
Payer: COMMERCIAL

## 2024-08-28 ENCOUNTER — HOSPITAL ENCOUNTER (OUTPATIENT)
Facility: HOSPITAL | Age: 59
Setting detail: OBSERVATION
Discharge: HOME | End: 2024-08-30
Attending: EMERGENCY MEDICINE | Admitting: INTERNAL MEDICINE
Payer: COMMERCIAL

## 2024-08-28 DIAGNOSIS — U07.1 COVID: ICD-10-CM

## 2024-08-28 DIAGNOSIS — I48.0 PAF (PAROXYSMAL ATRIAL FIBRILLATION) (HCC): ICD-10-CM

## 2024-08-28 DIAGNOSIS — R29.90 STROKE-LIKE SYMPTOMS: Primary | ICD-10-CM

## 2024-08-28 DIAGNOSIS — G45.9 TIA (TRANSIENT ISCHEMIC ATTACK): ICD-10-CM

## 2024-08-28 LAB
2HR DELTA HS TROPONIN: 0 NG/L
ANION GAP SERPL CALCULATED.3IONS-SCNC: 4 MMOL/L (ref 4–13)
APTT PPP: 42 SECONDS (ref 23–34)
ATRIAL RATE: 57 BPM
BNP SERPL-MCNC: 71 PG/ML (ref 0–100)
BUN SERPL-MCNC: 12 MG/DL (ref 5–25)
CALCIUM SERPL-MCNC: 9.3 MG/DL (ref 8.4–10.2)
CARDIAC TROPONIN I PNL SERPL HS: 3 NG/L
CARDIAC TROPONIN I PNL SERPL HS: 3 NG/L
CHLORIDE SERPL-SCNC: 102 MMOL/L (ref 96–108)
CO2 SERPL-SCNC: 29 MMOL/L (ref 21–32)
CREAT SERPL-MCNC: 0.89 MG/DL (ref 0.6–1.3)
CRP SERPL QL: 9.8 MG/L
D DIMER PPP FEU-MCNC: 0.5 UG/ML FEU
ERYTHROCYTE [DISTWIDTH] IN BLOOD BY AUTOMATED COUNT: 15.4 % (ref 11.6–15.1)
FLUAV RNA RESP QL NAA+PROBE: NEGATIVE
FLUBV RNA RESP QL NAA+PROBE: NEGATIVE
GFR SERPL CREATININE-BSD FRML MDRD: 70 ML/MIN/1.73SQ M
GLUCOSE SERPL-MCNC: 84 MG/DL (ref 65–140)
GLUCOSE SERPL-MCNC: 85 MG/DL (ref 65–140)
HCT VFR BLD AUTO: 36.8 % (ref 34.8–46.1)
HGB BLD-MCNC: 12.2 G/DL (ref 11.5–15.4)
INR PPP: 1.13 (ref 0.85–1.19)
MCH RBC QN AUTO: 33.4 PG (ref 26.8–34.3)
MCHC RBC AUTO-ENTMCNC: 33.2 G/DL (ref 31.4–37.4)
MCV RBC AUTO: 101 FL (ref 82–98)
P AXIS: 33 DEGREES
PLATELET # BLD AUTO: 435 THOUSANDS/UL (ref 149–390)
PMV BLD AUTO: 9.4 FL (ref 8.9–12.7)
POTASSIUM SERPL-SCNC: 4.1 MMOL/L (ref 3.5–5.3)
PR INTERVAL: 174 MS
PROCALCITONIN SERPL-MCNC: <0.05 NG/ML
PROTHROMBIN TIME: 14.7 SECONDS (ref 12.3–15)
QRS AXIS: 33 DEGREES
QRSD INTERVAL: 94 MS
QT INTERVAL: 440 MS
QTC INTERVAL: 428 MS
RBC # BLD AUTO: 3.65 MILLION/UL (ref 3.81–5.12)
RSV RNA RESP QL NAA+PROBE: NEGATIVE
SARS-COV-2 RNA RESP QL NAA+PROBE: POSITIVE
SODIUM SERPL-SCNC: 135 MMOL/L (ref 135–147)
T WAVE AXIS: 38 DEGREES
TSH SERPL DL<=0.05 MIU/L-ACNC: 2.32 UIU/ML (ref 0.45–4.5)
VENTRICULAR RATE: 57 BPM
WBC # BLD AUTO: 6.95 THOUSAND/UL (ref 4.31–10.16)

## 2024-08-28 PROCEDURE — 85027 COMPLETE CBC AUTOMATED: CPT | Performed by: EMERGENCY MEDICINE

## 2024-08-28 PROCEDURE — 93010 ELECTROCARDIOGRAM REPORT: CPT | Performed by: INTERNAL MEDICINE

## 2024-08-28 PROCEDURE — 84484 ASSAY OF TROPONIN QUANT: CPT | Performed by: INTERNAL MEDICINE

## 2024-08-28 PROCEDURE — 80061 LIPID PANEL: CPT | Performed by: INTERNAL MEDICINE

## 2024-08-28 PROCEDURE — 99285 EMERGENCY DEPT VISIT HI MDM: CPT | Performed by: EMERGENCY MEDICINE

## 2024-08-28 PROCEDURE — 84145 PROCALCITONIN (PCT): CPT | Performed by: INTERNAL MEDICINE

## 2024-08-28 PROCEDURE — 80048 BASIC METABOLIC PNL TOTAL CA: CPT | Performed by: EMERGENCY MEDICINE

## 2024-08-28 PROCEDURE — 84443 ASSAY THYROID STIM HORMONE: CPT | Performed by: INTERNAL MEDICINE

## 2024-08-28 PROCEDURE — 85379 FIBRIN DEGRADATION QUANT: CPT | Performed by: INTERNAL MEDICINE

## 2024-08-28 PROCEDURE — 84484 ASSAY OF TROPONIN QUANT: CPT | Performed by: EMERGENCY MEDICINE

## 2024-08-28 PROCEDURE — NC001 PR NO CHARGE: Performed by: PSYCHIATRY & NEUROLOGY

## 2024-08-28 PROCEDURE — 85730 THROMBOPLASTIN TIME PARTIAL: CPT | Performed by: EMERGENCY MEDICINE

## 2024-08-28 PROCEDURE — 70496 CT ANGIOGRAPHY HEAD: CPT

## 2024-08-28 PROCEDURE — 0241U HB NFCT DS VIR RESP RNA 4 TRGT: CPT | Performed by: EMERGENCY MEDICINE

## 2024-08-28 PROCEDURE — 85610 PROTHROMBIN TIME: CPT | Performed by: EMERGENCY MEDICINE

## 2024-08-28 PROCEDURE — 36415 COLL VENOUS BLD VENIPUNCTURE: CPT | Performed by: EMERGENCY MEDICINE

## 2024-08-28 PROCEDURE — 70498 CT ANGIOGRAPHY NECK: CPT

## 2024-08-28 PROCEDURE — 99223 1ST HOSP IP/OBS HIGH 75: CPT | Performed by: INTERNAL MEDICINE

## 2024-08-28 PROCEDURE — 82948 REAGENT STRIP/BLOOD GLUCOSE: CPT

## 2024-08-28 PROCEDURE — 99205 OFFICE O/P NEW HI 60 MIN: CPT | Performed by: PSYCHIATRY & NEUROLOGY

## 2024-08-28 PROCEDURE — 83880 ASSAY OF NATRIURETIC PEPTIDE: CPT | Performed by: INTERNAL MEDICINE

## 2024-08-28 PROCEDURE — 99285 EMERGENCY DEPT VISIT HI MDM: CPT

## 2024-08-28 PROCEDURE — 86140 C-REACTIVE PROTEIN: CPT | Performed by: INTERNAL MEDICINE

## 2024-08-28 PROCEDURE — 93005 ELECTROCARDIOGRAM TRACING: CPT

## 2024-08-28 RX ORDER — DOCUSATE SODIUM 100 MG/1
100 CAPSULE, LIQUID FILLED ORAL 2 TIMES DAILY
Status: DISCONTINUED | OUTPATIENT
Start: 2024-08-28 | End: 2024-08-31 | Stop reason: HOSPADM

## 2024-08-28 RX ORDER — ASPIRIN 81 MG/1
81 TABLET, CHEWABLE ORAL ONCE
Status: COMPLETED | OUTPATIENT
Start: 2024-08-28 | End: 2024-08-28

## 2024-08-28 RX ORDER — METOPROLOL SUCCINATE 25 MG/1
25 TABLET, EXTENDED RELEASE ORAL DAILY
Status: DISCONTINUED | OUTPATIENT
Start: 2024-08-28 | End: 2024-08-31 | Stop reason: HOSPADM

## 2024-08-28 RX ORDER — ONDANSETRON 2 MG/ML
4 INJECTION INTRAMUSCULAR; INTRAVENOUS EVERY 6 HOURS PRN
Status: DISCONTINUED | OUTPATIENT
Start: 2024-08-28 | End: 2024-08-31 | Stop reason: HOSPADM

## 2024-08-28 RX ORDER — BUPROPION HYDROCHLORIDE 150 MG/1
150 TABLET ORAL DAILY
Status: DISCONTINUED | OUTPATIENT
Start: 2024-08-29 | End: 2024-08-31 | Stop reason: HOSPADM

## 2024-08-28 RX ORDER — ALPRAZOLAM 0.5 MG
0.5 TABLET ORAL DAILY PRN
Status: DISCONTINUED | OUTPATIENT
Start: 2024-08-28 | End: 2024-08-31 | Stop reason: HOSPADM

## 2024-08-28 RX ORDER — LORAZEPAM 1 MG/1
1 TABLET ORAL ONCE
Status: DISCONTINUED | OUTPATIENT
Start: 2024-08-28 | End: 2024-08-29

## 2024-08-28 RX ORDER — HYDRALAZINE HYDROCHLORIDE 20 MG/ML
10 INJECTION INTRAMUSCULAR; INTRAVENOUS EVERY 6 HOURS PRN
Status: DISCONTINUED | OUTPATIENT
Start: 2024-08-28 | End: 2024-08-31 | Stop reason: HOSPADM

## 2024-08-28 RX ORDER — ZOLPIDEM TARTRATE 5 MG/1
10 TABLET ORAL
Status: DISCONTINUED | OUTPATIENT
Start: 2024-08-28 | End: 2024-08-31 | Stop reason: HOSPADM

## 2024-08-28 RX ORDER — ATORVASTATIN CALCIUM 40 MG/1
40 TABLET, FILM COATED ORAL EVERY EVENING
Status: DISCONTINUED | OUTPATIENT
Start: 2024-08-28 | End: 2024-08-31 | Stop reason: HOSPADM

## 2024-08-28 RX ORDER — ASPIRIN 81 MG/1
81 TABLET, CHEWABLE ORAL DAILY
Status: DISCONTINUED | OUTPATIENT
Start: 2024-08-29 | End: 2024-08-31 | Stop reason: HOSPADM

## 2024-08-28 RX ORDER — ACETAMINOPHEN 325 MG/1
650 TABLET ORAL EVERY 4 HOURS PRN
Status: DISCONTINUED | OUTPATIENT
Start: 2024-08-28 | End: 2024-08-31 | Stop reason: HOSPADM

## 2024-08-28 RX ORDER — GABAPENTIN 400 MG/1
800 CAPSULE ORAL 3 TIMES DAILY
Status: DISCONTINUED | OUTPATIENT
Start: 2024-08-28 | End: 2024-08-31 | Stop reason: HOSPADM

## 2024-08-28 RX ORDER — ZOLPIDEM TARTRATE 5 MG/1
10 TABLET ORAL
Status: DISCONTINUED | OUTPATIENT
Start: 2024-08-28 | End: 2024-08-28

## 2024-08-28 RX ORDER — FLECAINIDE ACETATE 50 MG/1
50 TABLET ORAL 2 TIMES DAILY
Status: DISCONTINUED | OUTPATIENT
Start: 2024-08-28 | End: 2024-08-31 | Stop reason: HOSPADM

## 2024-08-28 RX ORDER — BUSPIRONE HYDROCHLORIDE 5 MG/1
7.5 TABLET ORAL 2 TIMES DAILY
Status: DISCONTINUED | OUTPATIENT
Start: 2024-08-28 | End: 2024-08-31 | Stop reason: HOSPADM

## 2024-08-28 RX ADMIN — FLECAINIDE ACETATE 50 MG: 50 TABLET ORAL at 14:38

## 2024-08-28 RX ADMIN — ATORVASTATIN CALCIUM 40 MG: 40 TABLET, FILM COATED ORAL at 16:49

## 2024-08-28 RX ADMIN — GABAPENTIN 800 MG: 400 CAPSULE ORAL at 16:50

## 2024-08-28 RX ADMIN — REMDESIVIR 200 MG: 100 INJECTION, POWDER, LYOPHILIZED, FOR SOLUTION INTRAVENOUS at 14:47

## 2024-08-28 RX ADMIN — ZOLPIDEM TARTRATE 10 MG: 5 TABLET, COATED ORAL at 22:05

## 2024-08-28 RX ADMIN — RIVAROXABAN 20 MG: 20 TABLET, FILM COATED ORAL at 14:37

## 2024-08-28 RX ADMIN — IOHEXOL 85 ML: 350 INJECTION, SOLUTION INTRAVENOUS at 11:03

## 2024-08-28 RX ADMIN — FLECAINIDE ACETATE 50 MG: 50 TABLET ORAL at 22:08

## 2024-08-28 RX ADMIN — GABAPENTIN 800 MG: 400 CAPSULE ORAL at 22:05

## 2024-08-28 RX ADMIN — ASPIRIN 81 MG CHEWABLE TABLET 81 MG: 81 TABLET CHEWABLE at 11:47

## 2024-08-28 RX ADMIN — BUSPIRONE HYDROCHLORIDE 7.5 MG: 5 TABLET ORAL at 16:49

## 2024-08-28 RX ADMIN — METOPROLOL SUCCINATE 25 MG: 25 TABLET, EXTENDED RELEASE ORAL at 14:37

## 2024-08-28 RX ADMIN — ACETAMINOPHEN 650 MG: 325 TABLET ORAL at 22:05

## 2024-08-28 NOTE — QUICK NOTE
Patient expressed that she has difficulty with MRIs; stated that she would need to be sedated prior to any attempt at MRI brain.  At that time, 1 mg Ativan tablet was ordered to be given 30 minutes prior to study.  However, patient expressed to nursing that she actually requires anesthesia and would decline MRI brain without being completely put to sleep.    Discussed with MRI, that is not offered here at [facility].  As per neurology, inpatient MRI not required, will substitute repeat CT head tomorrow.  Refer patient to neurology on discharge who may arrange MRI brain with anesthesia if need be.

## 2024-08-28 NOTE — SPEECH THERAPY NOTE
Speech Language/Pathology  Speech/Language Pathology  Assessment    Patient Name: Lisa Tamayo  Today's Date: 8/29/2024     Problem List  Principal Problem:    TIA (transient ischemic attack)  Active Problems:    Dysthymic disorder    Essential hypertension    Mixed hyperlipidemia    PAF (paroxysmal atrial fibrillation) (HCC)    Morbid (severe) obesity due to excess calories (HCC)    COVID    Past Medical History  Past Medical History:   Diagnosis Date    A-fib (HCC)     Allergic     Anemia     Anxiety     Arthritis     Knees and back     Depression     Fall     Headache(784.0)     Hiatal hernia     Hyperlipidemia     Hypertension     Morbid obesity (HCC)     Obesity     Pneumonia      Past Surgical History  Past Surgical History:   Procedure Laterality Date    BACK SURGERY  1998    ESOPHAGOGASTRODUODENOSCOPY  10/17/2016    HERNIA REPAIR  10/2016    HIATAL HERNIA REPAIR  10/17/2016    CIRO Tello DO       LUMBAR DISC SURGERY  05/1998    Approx    PARTIAL GASTRECTOMY  10/17/2016    CIRO Tello DO       SLEEVE GASTROPLASTY  10/2016    SPINE SURGERY      25 years ago    VAGINAL DELIVERY      x1     Pt screened. No ST needs identified. Pt denies any difficulty w/ speech or swallow. MRI pending. CT head neg. Speech clear and fluent. Tolerating diet. Will d/c order. Please reconsult if ST needs arise.     H&P/Admit info/ pertinent provider notes: (PMH noted above)  Reason for Consult / Principal Problem: stroke alert     Patient last known well: 7;30 am  Stroke alert called: 10:49 am  Neurology time of arrival: 10:53 am  HPI: Lisa Tamayo is a 58 y.o. right handed female who presents with double vision, speech difficulties and left arm tingling.  Patient presented with her son daughter-in-law after she describes double vision at 7:30 in the morning which progressed to difficulties with speech and difficulties writing at 9 in the morning.  Around 10 in the morning patient started experiencing left arm  tingling sensation.  Family were taking patient to [facility] emergency department as at 10:30 in the morning patient has significant improvement in her speech.  Upon evaluation her during the stroke alert, patient has 75 to 80% improvement in her clinical presentation with no difficulties with expressing herself or visual dysfunction advised.  Patient is fully ambulatory with no other history of neurological condition advised.  Patient has transient balance dysfunction due to scoliosis and bilateral knee osteoarthritis.  Patient has known history of migraine headaches concerning patient described having severe headache started on August 27 and headache improved this morning with severity decreased from 7/10 to 2/10.  Blood pressure on admission 171/77 mmHg.  Patient has known history of generalized anxiety disorder, primary insomnia, chronic pain both knees, hypertension/hyperlipidemia and paroxysmal A-fib.  Patient has been taking low-dose of metoprolol and flecainide as she is anticoagulated with Xarelto.  Patient has intermittent bradycardia believed to be due to beta-blocker.  Patient has remote history of Bell's palsy with right facial droop reported previously.      Special Studies:  +COVID  8/28/24 CTA:  No large vessel occlusion, high-grade stenosis, or intracranial aneurysm identified on CT angiogram of the head.  No hemodynamically significant stenosis or dissection identified on CT angiogram of the neck.  Enlarged main pulmonary artery indicative of pulmonary hypertension.  8/28 CT stroke:  No acute intracranial abnormality.     EGD 9/11/23:  IMPRESSION:  The esophagus, duodenal bulb, 1st part of the duodenum and 2nd part of the duodenum appeared normal.  The gastric suture site appeared normal.  Mild erythematous mucosa in the pylorus  RECOMMENDATION:  Await pathology results  PPI daily  Valerio's surveillance in 5 years due to h/o sleeve     Procalcitonin<=0.25ng/ml    WBC  4.31-10.16 Thousand/uL   6.95

## 2024-08-28 NOTE — ASSESSMENT & PLAN NOTE
- pt with BMI of Body mass index is 45.99 kg/m².  - pt counseled on risks associated with obesity and it's contribution to other health issues  - advise portion control, healthy food choices, drinking water instead of juice/soda  - advise beginning light exercise program (i.e. Walking 30min 4-5x/wk)  - advise keeping food diary to help identify problem foods/times/stressors  - pt advised that weight loss of ~ 1lb/wk is a good goal and not to become frustrated if loss is slower

## 2024-08-28 NOTE — ASSESSMENT & PLAN NOTE
Mrs. Tamayo has presented to [facility] ED for evaluation of double vision, dysarthric speech and left arm sensory dysfunction which started 730 am 8/28/2024.  Stroke alert was called at 10:49 am as at that time patient described near complete resolution of dysarthric speech and her other symptoms.    CT scan of the head and CT angiogram head and neck was personally discussed with radiology, patient has no acute intracranial pathology, no large vessel occlusion.    Patient was not a candidate for TNK due to improving symptoms and being anticoagulated for A-fib.    Patient has COVID 19 infection and she is on remdesivir.    NO new focal neurologic deficit described since last evaluation.     PLAN:  Continue Stroke pathway:    · Recommended MRI brain is pending, premedication with Diazepam 10 mg IV advised due to claustrophobia     · Echo done and with no acute pathology, LVEF 61 %    · Recommend the following Labs completed :  mg/dL with a goal <70    · Recommend continue aspirin 81 mg and continue Xarelto    · Recommend atorvastatin 40 mg daily, again with a goal of LDL <70 mg/dL    · Permissive HTN, allow for SBP up to 220 x 24 hours or Goal MAP> 100 from onset of stroke like symptoms, then goal normotension. Goal normotension sooner if MRI brain completed and does not reveal acute infarct.    · Euglycemic, normothermic goal    · Continue telemetry.    · PT/OT/ST    · Stroke education    · Frequent neuro checks. Continue to monitor and notify neurology with any changes.    · STAT CT head for any acute change in neuro exam    Medical management and supportive care per primary team. Correction of any metabolic or infectious disturbances.  Patient describes gradually improving headache, patient may benefit from Tylenol 1 g if clinically indicated.

## 2024-08-28 NOTE — ED PROVIDER NOTES
History  Chief Complaint   Patient presents with    CVA/TIA-like Symptoms     Pt c/o tingling in left arm, double vision, and garbling words when talking at work. Per visitor, pt seems like she is word searching and speaking slower. Pt reports double vision and tingling has resolved. Onset of symptoms around 0730.     59 yo F with HTN, h/o afib on xarelto, brought to the ED by her family members, for onset of stroke like symptoms.  She considers her last normal to be 7:30 am, when she was experiencing double, vision, and headache, at work she experienced left arm tingling, and onset of speech disturbance, with some slurring, about 9am while still at work.  She notified her family, and was giving them times by text message.  When they picked her up, they affirm he speech was still affected about 10am.  On arrival, she says the symptoms have nearly completely resolved.  She denies h/o migraine, and she denies similar symptoms previously.  I activated stroke alert based on symptoms and onset          Prior to Admission Medications   Prescriptions Last Dose Informant Patient Reported? Taking?   ALPRAZolam (XANAX) 0.5 mg tablet 8/27/2024  No Yes   Sig: Take 1 tablet (0.5 mg total) by mouth daily as needed for anxiety   buPROPion (WELLBUTRIN SR) 100 mg 12 hr tablet 8/28/2024  No Yes   Sig: TAKE ONE TABLET BY MOUTH TWICE A DAY   buPROPion (Wellbutrin SR) 100 mg 12 hr tablet 8/28/2024  No Yes   Sig: Take 1 tablet (100 mg total) by mouth 2 (two) times a day   busPIRone (BUSPAR) 7.5 mg tablet 8/28/2024  No Yes   Sig: Take 1 tablet (7.5 mg total) by mouth 2 (two) times a day   flecainide (TAMBOCOR) 50 mg tablet 8/27/2024  No Yes   Sig: TAKE ONE TABLET BY MOUTH TWICE A DAY   gabapentin (NEURONTIN) 800 mg tablet 8/27/2024  No Yes   Sig: Take 1 tablet (800 mg total) by mouth 3 (three) times a day   lisinopril (ZESTRIL) 10 mg tablet 8/27/2024  No Yes   Sig: TAKE ONE TABLET BY MOUTH EVERY DAY   metoprolol succinate (TOPROL-XL) 25  mg 24 hr tablet 8/27/2024  No Yes   Sig: TAKE ONE TABLET BY MOUTH EVERY DAY   rivaroxaban (Xarelto) 20 mg tablet 8/27/2024  No Yes   Sig: TAKE ONE TABLET BY MOUTH EVERY DAY   sertraline (ZOLOFT) 100 mg tablet 8/28/2024  No Yes   Sig: Take 1.5 tablets (150 mg total) by mouth daily   tirzepatide (Zepbound) 15 mg/0.5 mL auto-injector Past Week  No Yes   Sig: Inject 0.5 mL (15 mg total) under the skin once a week   zolpidem (AMBIEN) 10 mg tablet 8/27/2024  No Yes   Sig: Take 1 tablet (10 mg total) by mouth daily at bedtime as needed for sleep      Facility-Administered Medications: None       Past Medical History:   Diagnosis Date    A-fib (HCC)     Allergic     Anemia     Anxiety     Arthritis     Knees and back     Depression     Fall     Headache(784.0)     Hiatal hernia     Hyperlipidemia     Hypertension     Morbid obesity (HCC)     Obesity     Pneumonia        Past Surgical History:   Procedure Laterality Date    BACK SURGERY  1998    ESOPHAGOGASTRODUODENOSCOPY  10/17/2016    HERNIA REPAIR  10/2016    HIATAL HERNIA REPAIR  10/17/2016    CIRO Tello DO       LUMBAR DISC SURGERY  05/1998    Approx    PARTIAL GASTRECTOMY  10/17/2016    CIRO Tello DO       SLEEVE GASTROPLASTY  10/2016    SPINE SURGERY      25 years ago    VAGINAL DELIVERY      x1       Family History   Problem Relation Age of Onset    Arthritis Mother     COPD Mother     Diabetes Mother     Obesity Mother     Coronary artery disease Mother     Coronary artery disease Father     Diabetes Father     Heart attack Father     Heart disease Father     Obesity Father     Early death Father     Alzheimer's disease Paternal Grandfather     Dementia Paternal Grandfather      I have reviewed and agree with the history as documented.    E-Cigarette/Vaping    E-Cigarette Use Never User      E-Cigarette/Vaping Substances    Nicotine No     THC No     CBD No     Flavoring No     Other No     Unknown No      Social History     Tobacco Use    Smoking  status: Never    Smokeless tobacco: Never   Vaping Use    Vaping status: Never Used   Substance Use Topics    Alcohol use: Yes     Comment: On occasion … not on a regular basis    Drug use: Never       Review of Systems    Physical Exam  Physical Exam  Vitals and nursing note reviewed.   Constitutional:       General: She is not in acute distress.     Appearance: Normal appearance. She is well-developed. She is not toxic-appearing or diaphoretic.   HENT:      Head: Normocephalic and atraumatic. No raccoon eyes, De La Vega's sign, abrasion, contusion, right periorbital erythema, left periorbital erythema or laceration.      Right Ear: Tympanic membrane and external ear normal. No laceration.      Left Ear: Tympanic membrane and external ear normal. No laceration.      Nose: Nose normal. No nasal deformity, septal deviation or laceration.      Mouth/Throat:      Mouth: Mucous membranes are moist.      Dentition: Normal dentition.   Eyes:      Conjunctiva/sclera: Conjunctivae normal.      Pupils: Pupils are equal, round, and reactive to light.   Neck:      Trachea: Trachea and phonation normal.      Meningeal: Brudzinski's sign and Kernig's sign absent.   Cardiovascular:      Rate and Rhythm: Normal rate and regular rhythm.      Pulses: Normal pulses.      Heart sounds: Normal heart sounds. No murmur heard.  Pulmonary:      Effort: Pulmonary effort is normal. No tachypnea or respiratory distress.      Breath sounds: Normal breath sounds. No decreased breath sounds or wheezing.   Chest:      Chest wall: No deformity, tenderness or crepitus.   Abdominal:      General: Bowel sounds are normal. There is no distension.      Palpations: Abdomen is soft. Abdomen is not rigid.      Tenderness: There is no abdominal tenderness. There is no guarding or rebound.   Musculoskeletal:         General: Normal range of motion.      Right shoulder: Normal.      Left shoulder: Normal.      Right elbow: Normal.      Left elbow: Normal.       Right wrist: Normal.      Left wrist: Normal.      Cervical back: Full passive range of motion without pain, normal range of motion and neck supple. No spinous process tenderness (Cspine cleared).      Thoracic back: Normal.      Lumbar back: Normal.      Right hip: Normal.      Left hip: Normal.      Right knee: Normal.      Left knee: Normal.      Right lower leg: No swelling.      Left lower leg: No swelling.      Right ankle: Normal.      Left ankle: Normal.      Right foot: Normal.      Left foot: Normal.   Lymphadenopathy:      Cervical: No cervical adenopathy.   Skin:     General: Skin is warm and dry.      Capillary Refill: Capillary refill takes less than 2 seconds.      Coloration: Skin is not pale.      Findings: No rash.   Neurological:      Mental Status: She is alert and oriented to person, place, and time.      GCS: GCS eye subscore is 4. GCS verbal subscore is 5. GCS motor subscore is 6.      Cranial Nerves: No cranial nerve deficit.      Sensory: No sensory deficit.      Coordination: Coordination normal.      Gait: Gait normal.      Deep Tendon Reflexes: Reflexes are normal and symmetric.      Comments: No pronator drift  BUE strength 6/6  BLE strength 6/6  Symmetrical  strength  Bilateral symmetrical rapid alternating movements that cross midline  Bilateral normal finger nose and crosses midline  No nystagmus  CN 2-12 intact      Psychiatric:         Speech: Speech normal.         Behavior: Behavior normal.         Thought Content: Thought content normal.         Judgment: Judgment normal.         Vital Signs  ED Triage Vitals   Temperature Pulse Respirations Blood Pressure SpO2   08/28/24 1147 08/28/24 1042 08/28/24 1041 08/28/24 1042 08/28/24 1042   97.7 °F (36.5 °C) 55 18 (!) 171/77 99 %      Temp Source Heart Rate Source Patient Position - Orthostatic VS BP Location FiO2 (%)   08/28/24 1147 08/28/24 1041 08/28/24 1041 08/28/24 1041 --   Oral Monitor Lying Right arm       Pain Score        08/28/24 1045       5           Vitals:    08/29/24 0900 08/29/24 0903 08/29/24 1201 08/29/24 1630   BP:  145/71 140/82 160/72   Pulse: 56 (!) 54 60 58   Patient Position - Orthostatic VS:  Sitting Sitting Sitting         Visual Acuity  Visual Acuity      Flowsheet Row Most Recent Value   L Pupil Size (mm) 3   R Pupil Size (mm) 3   L Pupil Shape Round   R Pupil Shape Round            ED Medications  Medications   ALPRAZolam (XANAX) tablet 0.5 mg (has no administration in time range)   buPROPion (WELLBUTRIN XL) 24 hr tablet 150 mg (150 mg Oral Given 8/29/24 0905)   busPIRone (BUSPAR) tablet 7.5 mg (7.5 mg Oral Given 8/29/24 1749)   flecainide (TAMBOCOR) tablet 50 mg (50 mg Oral Given 8/29/24 1312)   gabapentin (NEURONTIN) capsule 800 mg (800 mg Oral Given 8/29/24 1749)   metoprolol succinate (TOPROL-XL) 24 hr tablet 25 mg (25 mg Oral Given 8/29/24 0905)   rivaroxaban (XARELTO) tablet 20 mg (20 mg Oral Given 8/29/24 0906)   sertraline (ZOLOFT) tablet 150 mg (150 mg Oral Given 8/29/24 0905)   acetaminophen (TYLENOL) tablet 650 mg (650 mg Oral Given 8/29/24 1438)   docusate sodium (COLACE) capsule 100 mg (100 mg Oral Not Given 8/29/24 1749)   ondansetron (ZOFRAN) injection 4 mg (has no administration in time range)   atorvastatin (LIPITOR) tablet 40 mg (40 mg Oral Given 8/29/24 1749)   aspirin chewable tablet 81 mg (81 mg Oral Given 8/29/24 0905)   hydrALAZINE (APRESOLINE) injection 10 mg (has no administration in time range)   remdesivir (Veklury) 200 mg in sodium chloride 0.9 % 290 mL IVPB (200 mg Intravenous New Bag 8/28/24 1447)     Followed by   remdesivir (Veklury) 100 mg in sodium chloride 0.9 % 270 mL IVPB (100 mg Intravenous New Bag 8/29/24 1312)   zolpidem (AMBIEN) tablet 10 mg (10 mg Oral Given 8/28/24 2201)   diazepam (VALIUM) injection 10 mg (has no administration in time range)   diazepam (VALIUM) injection 5 mg (has no administration in time range)   iohexol (OMNIPAQUE) 350 MG/ML injection (SINGLE-DOSE)  85 mL (85 mL Intravenous Given 8/28/24 1103)   aspirin chewable tablet 81 mg (81 mg Oral Given 8/28/24 1147)       Diagnostic Studies  Results Reviewed       Procedure Component Value Units Date/Time    Lipid Panel with Direct LDL reflex [[id number]]  (Abnormal) Collected: 08/28/24 1050    Lab Status: Final result Specimen: Blood from Arm, Left Updated: 08/29/24 0416     Cholesterol 273 mg/dL      Triglycerides 194 mg/dL      HDL, Direct 38 mg/dL      LDL Calculated 196 mg/dL     Procalcitonin [[id number]]  (Normal) Collected: 08/28/24 1050    Lab Status: Final result Specimen: Blood from Arm, Left Updated: 08/28/24 1437     Procalcitonin <0.05 ng/ml     C-reactive protein [[id number]]  (Abnormal) Collected: 08/28/24 1050    Lab Status: Final result Specimen: Blood from Arm, Left Updated: 08/28/24 1437     CRP 9.8 mg/L     HS Troponin I 2hr [[id number]]  (Normal) Collected: 08/28/24    Lab Status: Final result Specimen: Blood Updated: 08/28/24 1411     hs TnI 2hr 3 ng/L      Delta 2hr hsTnI 0 ng/L     B-Type Natriuretic Peptide(BNP) [[id number]]  (Normal) Collected: 08/28/24 1050    Lab Status: Final result Specimen: Blood from Arm, Left Updated: 08/28/24 1410     BNP 71 pg/mL     D-dimer, quantitative [[id number]]  (Abnormal) Collected: 08/28/24 1050    Lab Status: Final result Specimen: Blood from Arm, Left Updated: 08/28/24 1404     D-Dimer, Quant 0.50 ug/ml FEU     Narrative:      In the evaluation for possible pulmonary embolism, in the appropriate (Well's Score of 4 or less) patient, the age adjusted d-dimer cutoff for this patient can be calculated as:    Age x 0.01 (in ug/mL) for Age-adjusted D-dimer exclusion threshold for a patient over 50 years.    TSH, 3rd generation with Free T4 reflex [[id number]]  (Normal) Collected: 08/28/24 1050    Lab Status: Final result Specimen: Blood from Arm, Left Updated: 08/28/24 1338     TSH 3RD GENERATON 2.318 uIU/mL     FLU/RSV/COVID - if FLU/RSV clinically relevant [[id number]]   (Abnormal) Collected: 08/28/24 1050    Lab Status: Final result Specimen: Nares from Nose Updated: 08/28/24 1218     SARS-CoV-2 Positive     INFLUENZA A PCR Negative     INFLUENZA B PCR Negative     RSV PCR Negative    Narrative:      This test has been performed using the CoV-2/Flu/RSV plus assay on the [employer] GeneXpert platform. This test has been validated by the  and verified by the performing laboratory.     This test is designed to amplify and detect the following: nucleocapsid (N), envelope (E), and RNA-dependent RNA polymerase (RdRP) genes of the SARS-CoV-2 genome; matrix (M), basic polymerase (PB2), and acidic protein (PA) segments of the influenza A genome; matrix (M) and non-structural protein (NS) segments of the influenza B genome, and the nucleocapsid genes of RSV A and RSV B.     Positive results are indicative of the presence of Flu A, Flu B, RSV, and/or SARS-CoV-2 RNA. Positive results for SARS-CoV-2 or suspected novel influenza should be reported to state, local, or federal health departments according to local reporting requirements.      All results should be assessed in conjunction with clinical presentation and other laboratory markers for clinical management.     FOR PEDIATRIC PATIENTS - copy/paste COVID Guidelines URL to browser: [email or web address]       HS Troponin 0hr (reflex protocol) [[id number]]  (Normal) Collected: 08/28/24 1050    Lab Status: Final result Specimen: Blood from Arm, Left Updated: 08/28/24 1124     hs TnI 0hr 3 ng/L     Basic metabolic panel [[id number]] Collected: 08/28/24 1050    Lab Status: Final result Specimen: Blood from Arm, Left Updated: 08/28/24 1122     Sodium 135 mmol/L      Potassium 4.1 mmol/L      Chloride 102 mmol/L      CO2 29 mmol/L      ANION GAP 4 mmol/L      BUN 12 mg/dL      Creatinine 0.89 mg/dL      Glucose 84 mg/dL      Calcium 9.3 mg/dL      eGFR 70 ml/min/1.73sq m     Narrative:       National Kidney Disease Foundation guidelines for Chronic Kidney Disease (CKD):     Stage 1 with normal or high GFR (GFR > 90 mL/min/1.73 square meters)    Stage 2 Mild CKD (GFR = 60-89 mL/min/1.73 square meters)    Stage 3A Moderate CKD (GFR = 45-59 mL/min/1.73 square meters)    Stage 3B Moderate CKD (GFR = 30-44 mL/min/1.73 square meters)    Stage 4 Severe CKD (GFR = 15-29 mL/min/1.73 square meters)    Stage 5 End Stage CKD (GFR <15 mL/min/1.73 square meters)  Note: GFR calculation is accurate only with a steady state creatinine    Protime-INR [[id number]]  (Normal) Collected: 08/28/24 1050    Lab Status: Final result Specimen: Blood from Arm, Left Updated: 08/28/24 1113     Protime 14.7 seconds      INR 1.13    Narrative:      INR Therapeutic Range    Indication                                             INR Range      Atrial Fibrillation                                               2.0-3.0  Hypercoagulable State                                    2.0.2.3  Left Ventricular Asist Device                            2.0-3.0  Mechanical Heart Valve                                  -    Aortic(with afib, MI, embolism, HF, LA enlargement,    and/or coagulopathy)                                     2.0-3.0 (2.5-3.5)     Mitral                                                             2.5-3.5  Prosthetic/Bioprosthetic Heart Valve               2.0-3.0  Venous thromboembolism (VTE: VT, PE        2.0-3.0    APTT [[id number]]  (Abnormal) Collected: 08/28/24 1050    Lab Status: Final result Specimen: Blood from Arm, Left Updated: 08/28/24 1113     PTT 42 seconds     CBC and Platelet [[id number]]  (Abnormal) Collected: 08/28/24 1050    Lab Status: Final result Specimen: Blood from Arm, Left Updated: 08/28/24 1056     WBC 6.95 Thousand/uL      RBC 3.65 Million/uL      Hemoglobin 12.2 g/dL      Hematocrit 36.8 %       fL      MCH 33.4 pg      MCHC 33.2 g/dL      RDW 15.4 %      Platelets 435 Thousands/uL      MPV 9.4  fL     Fingerstick Glucose (POCT) [[id number]]  (Normal) Collected: 08/28/24 1042    Lab Status: Final result Specimen: Blood Updated: 08/28/24 1045     POC Glucose 85 mg/dl                    CT head wo contrast   Final Result by Hoang Del Toro MD (08/29 1130)      No mass effect, acute intracranial hemorrhage or evidence of recent infarction.                  Workstation performed: [id number]         CTA stroke alert (head/neck)   Final Result by Ryan Rowland MD (08/28 1111)      No large vessel occlusion, high-grade stenosis, or intracranial aneurysm identified on CT angiogram of the head.      No hemodynamically significant stenosis or dissection identified on CT angiogram of the neck.      Enlarged main pulmonary artery indicative of pulmonary hypertension.         I personally discussed this study with Dr. Haley on 8/28/2024 at 11:09 AM.            Workstation performed: [id number]         CT stroke alert brain   Final Result by Ryan Rowland MD (08/28 1112)      No acute intracranial abnormality.      Findings were directly discussed with Dr. Bertha Gabriel  at approximately 11:04 a.m on 8/28/2024..      Workstation performed: [id number]         MRI inpatient order    (Results Pending)              Procedures  ECG 12 Lead Documentation Only    Date/Time: 8/28/2024 10:46 PM    Performed by: Alonso Marshall MD  Authorized by: Alonso Marshall MD    Rate:     ECG rate:  57  Rhythm:     Rhythm: sinus rhythm    Ectopy:     Ectopy: none    QRS:     QRS axis:  Normal    QRS intervals:  Normal  Conduction:     Conduction: normal    ST segments:     ST segments:  Normal  T waves:     T waves: normal             ED Course  ED Course as of 08/29/24 1851   Wed Aug 28, 2024   1108 CTH/CTA reviewed by Dr. Medellin, considered it negative.  She is examining now, having taken history and is recommending admission for stroke pathway                    Stroke Assessment       Row Name 08/28/24 104              NIH Stroke Scale    Interval Baseline      Level of Consciousness (1a.) 0      LOC Questions (1b.) 0      LOC Commands (1c.) 0      Best Gaze (2.) 0      Visual (3.) 0      Facial Palsy (4.) 0      Motor Arm, Left (5a.) 0      Motor Arm, Right (5b.) 0      Motor Leg, Left (6a.) 0      Motor Leg, Right (6b.) 0      Limb Ataxia (7.) 0      Sensory (8.) 0      Best Language (9.) 0      Dysarthria (10.) 0      Extinction and Inattention (11.) (Formerly Neglect) 0      Total 0                    Flowsheet Row Most Recent Value   Thrombolytic Decision Options    Thrombolytic Decision Patient not a candidate.   Patient is not a candidate options Symptoms resolved/clearly non disabling., Bleeding risk.                      SBIRT 22yo+      Flowsheet Row Most Recent Value   Initial Alcohol Screen: US AUDIT-C     1. How often do you have a drink containing alcohol? 0 Filed at: 08/28/2024 1043   2. How many drinks containing alcohol do you have on a typical day you are drinking?  0 Filed at: 08/28/2024 1043   3b. FEMALE Any Age, or MALE 65+: How often do you have 4 or more drinks on one occassion? 0 Filed at: 08/28/2024 1043   Audit-C Score 0 Filed at: 08/28/2024 1043   BOB: How many times in the past year have you...    Used an illegal drug or used a prescription medication for non-medical reasons? Never Filed at: 08/28/2024 1043                      Medical Decision Making  Amount and/or Complexity of Data Reviewed  Labs: ordered.  Radiology: ordered.    Risk  OTC drugs.  Prescription drug management.  Decision regarding hospitalization.                 Disposition  Final diagnoses:   Stroke-like symptoms     Time reflects when diagnosis was documented in both MDM as applicable and the Disposition within this note       Time User Action Codes Description Comment    8/28/2024 10:49 AM Karla Argueta Add [R29.90] Stroke-like symptoms           ED Disposition       ED Disposition   Admit    Condition   Stable    Date/Time    Wed Aug 28, 2024 1140    Comment   Case was discussed with Dr. Adler and the patient's admission status was agreed to be Admission Status: observation status to the service of Dr. Adler .               Follow-up Information    None         Current Discharge Medication List        CONTINUE these medications which have NOT CHANGED    Details   ALPRAZolam (XANAX) 0.5 mg tablet Take 1 tablet (0.5 mg total) by mouth daily as needed for anxiety  Qty: 30 tablet, Refills: 0    Associated Diagnoses: ANNABELLA (generalized anxiety disorder)      !! buPROPion (WELLBUTRIN SR) 100 mg 12 hr tablet TAKE ONE TABLET BY MOUTH TWICE A DAY  Qty: 60 tablet, Refills: 1    Associated Diagnoses: Morbid obesity with BMI of 45.0-49.9, adult ([facility])      !! buPROPion (Wellbutrin SR) 100 mg 12 hr tablet Take 1 tablet (100 mg total) by mouth 2 (two) times a day  Qty: 60 tablet, Refills: 0    Associated Diagnoses: Morbid obesity with BMI of 45.0-49.9, adult ([facility])      busPIRone (BUSPAR) 7.5 mg tablet Take 1 tablet (7.5 mg total) by mouth 2 (two) times a day  Qty: 60 tablet, Refills: 5    Associated Diagnoses: ANNABELLA (generalized anxiety disorder)      flecainide (TAMBOCOR) 50 mg tablet TAKE ONE TABLET BY MOUTH TWICE A DAY  Qty: 180 tablet, Refills: 0    Associated Diagnoses: PAF (paroxysmal atrial fibrillation) ([facility])      gabapentin (NEURONTIN) 800 mg tablet Take 1 tablet (800 mg total) by mouth 3 (three) times a day  Qty: 90 tablet, Refills: 5    Associated Diagnoses: Chronic pain of both knees      lisinopril (ZESTRIL) 10 mg tablet TAKE ONE TABLET BY MOUTH EVERY DAY  Qty: 90 tablet, Refills: 1    Associated Diagnoses: Essential hypertension      metoprolol succinate (TOPROL-XL) 25 mg 24 hr tablet TAKE ONE TABLET BY MOUTH EVERY DAY  Qty: 90 tablet, Refills: 3    Associated Diagnoses: PAF (paroxysmal atrial fibrillation) ([facility])      rivaroxaban (Xarelto) 20 mg tablet TAKE ONE TABLET BY MOUTH EVERY DAY  Qty: 90 tablet, Refills: 0    Associated Diagnoses:  PAF (paroxysmal atrial fibrillation) (HCC)      sertraline (ZOLOFT) 100 mg tablet Take 1.5 tablets (150 mg total) by mouth daily  Qty: 45 tablet, Refills: 5    Associated Diagnoses: ANNABELLA (generalized anxiety disorder)      tirzepatide (Zepbound) 15 mg/0.5 mL auto-injector Inject 0.5 mL (15 mg total) under the skin once a week  Qty: 6 mL, Refills: 0    Associated Diagnoses: Morbid (severe) obesity due to excess calories ([facility])      zolpidem (AMBIEN) 10 mg tablet Take 1 tablet (10 mg total) by mouth daily at bedtime as needed for sleep  Qty: 30 tablet, Refills: 0    Associated Diagnoses: Primary insomnia       !! - Potential duplicate medications found. Please discuss with provider.          No discharge procedures on file.    PDMP Review         Value Time User    PDMP Reviewed  Yes 8/26/2024 12:26 PM Ann Duarte DO            ED Provider  Electronically Signed by             Alonso Marshall MD  08/29/24 8961

## 2024-08-28 NOTE — LETTER
[place] [id number]  [address] [facility] [address]  Dept: [phone]    August 30, 2024     Patient: Lisa Tamayo   YOB: 1965   Date of Visit: 8/28/2024       To Whom it May Concern:    Lisa Tamayo is under my professional care. She was seen in the hospital from 8/28/2024 to 08/30/24. She may return to work on 9/3/24 without limitations.    If you have any questions or concerns, please don't hesitate to call.         Sincerely,          Cher Seaman PA-C

## 2024-08-28 NOTE — ASSESSMENT & PLAN NOTE
Heart rate slightly bradycardic, but stable at 55 bpm on admission  Currently rate controlled with Toprol and flecainide  Anticoagulated with Xarelto  Monitor on telemetry

## 2024-08-28 NOTE — CONSULTS
Consultation - Stroke   Lisa Tamayo 58 y.o. female MRN: [id number]  Unit/Bed#: ED-16 Encounter: [id number]      Assessment & Plan     TIA (transient ischemic attack)  Assessment & Plan  Mrs. Tamayo has presented to [facility] ED for evaluation of double vision, dysarthric speech and left arm sensory dysfunction which started 730 this morning.  Stroke alert was called at 10:49 am as at that time patient described near complete resolution of dysarthric speech and her other symptoms.    CT scan of the head and CT angiogram head and neck was personally discussed with radiology, patient has no acute intracranial pathology, no large vessel occlusion.    Patient was not a candidate for TNK due to improving symptoms and being anticoagulated for A-fib.    Considering patient has high risk for stroke with hypertension/hyperlipidemia/cardiac dysfunction patient was advised admission under stroke path.     PLAN:  Admit to Stroke pathway:    · Recommend MRI brain    · Consider Echo    · Recommend the following Labs:    ? Lipid Panel    ? Hemoglobin A1c    ? TSH    ? UA    · Recommend starting aspirin 81 mg and continue Xarelto    · Recommend atorvastatin 40 mg daily    · Permissive HTN, allow for SBP up to 220 x 24 hours or Goal MAP> 100 from onset of stroke like symptoms, then goal normotension. Goal normotension sooner if MRI brain completed and does not reveal acute infarct.    · Euglycemic, normothermic goal    · Continue telemetry.    · PT/OT/ST    · Stroke education    · Frequent neuro checks. Continue to monitor and notify neurology with any changes.    · STAT CT head for any acute change in neuro exam    Medical management and supportive care per primary team. Correction of any metabolic or infectious disturbances.  Patient describes gradually improving headache, patient may benefit from Tylenol 1 g if clinically indicated.            Thrombolytic Decision: Patient not a candidate. Symptoms  resolved/clearly non disabling. and patient is on AC.      Lisa Tamayo will not need outpatient follow up with Neurology. She will not require outpatient neurological testing.    History of Present Illness     Reason for Consult / Principal Problem: stroke alert    Patient last known well: 7;30 am  Stroke alert called: 10:49 am  Neurology time of arrival: 10:53 am  HPI: Lisa Tamayo is a 58 y.o. right handed female who presents with double vision, speech difficulties and left arm tingling.  Patient presented with her son daughter-in-law after she describes double vision at 7:30 in the morning which progressed to difficulties with speech and difficulties writing at 9 in the morning.  Around 10 in the morning patient started experiencing left arm tingling sensation.  Family were taking patient to [place] emergency department as at 10:30 in the morning patient has significant improvement in her speech.  Upon evaluation her during the stroke alert, patient has 75 to 80% improvement in her clinical presentation with no difficulties with expressing herself or visual dysfunction advised.  Patient is fully ambulatory with no other history of neurological condition advised.  Patient has transient balance dysfunction due to scoliosis and bilateral knee osteoarthritis.  Patient has known history of migraine headaches concerning patient described having severe headache started on August 27 and headache improved this morning with severity decreased from 7/10 to 2/10.  Blood pressure on admission 171/77 mmHg.  Patient has known history of generalized anxiety disorder, primary insomnia, chronic pain both knees, hypertension/hyperlipidemia and paroxysmal A-fib.  Patient has been taking low-dose of metoprolol and flecainide as she is anticoagulated with Xarelto.  Patient has intermittent bradycardia believed to be due to beta-blocker.  Patient has remote history of Bell's palsy with right facial droop reported  previously.    Consults    Review of Systems    Historical Information   Past Medical History:   Diagnosis Date    A-fib (HCC)     Allergic     Anemia     Anxiety     Arthritis     Knees and back     Depression     Fall     Headache(784.0)     Hiatal hernia     Hyperlipidemia     Hypertension     Morbid obesity (HCC)     Obesity     Pneumonia      Past Surgical History:   Procedure Laterality Date    BACK SURGERY  1998    ESOPHAGOGASTRODUODENOSCOPY  10/17/2016    HERNIA REPAIR  10/2016    HIATAL HERNIA REPAIR  10/17/2016    CIRO Connor Tello DO       LUMBAR DISC SURGERY  05/1998    Approx    PARTIAL GASTRECTOMY  10/17/2016    CIRO Raymundoel Omer DO       SLEEVE GASTROPLASTY  10/2016    SPINE SURGERY      25 years ago    VAGINAL DELIVERY      x1     Social History   Social History     Substance and Sexual Activity   Alcohol Use Yes    Comment: On occasion … not on a regular basis     Social History     Substance and Sexual Activity   Drug Use Never     E-Cigarette/Vaping    E-Cigarette Use Never User      E-Cigarette/Vaping Substances    Nicotine No     THC No     CBD No     Flavoring No     Other No     Unknown No      Social History     Tobacco Use   Smoking Status Never   Smokeless Tobacco Never     Family History: non-contributory    Review of previous medical records was  completed.     Meds/Allergies   all current active meds have been reviewed    Allergies   Allergen Reactions    Fentanyl Itching       Objective   Vitals:Blood pressure 133/57, pulse 56, temperature 97.7 °F (36.5 °C), temperature source Oral, resp. rate (!) 28, weight 116 kg (255 lb 8.2 oz), SpO2 98%.,Body mass index is 45.99 kg/m².  No intake or output data in the 24 hours ending 08/28/24 1148    Invasive Devices:   Invasive Devices       Peripheral Intravenous Line  Duration             Peripheral IV 08/28/24 Left Antecubital <1 day                    Physical Exam  Neurologic Exam    NIHSS:  1a.Level of Consciousness: 0 = Alert   1b. LOC  "Questions: 0 = Answers both correctly   1c. LOC Commands: 0 = Obeys both correctly   2. Best Gaze: 0 = Normal   3. Visual: 0 = No visual field loss   4. Facial Palsy: 0=Normal symmetric movement   5a. Motor Right Arm: 0=No drift, limb holds 90 (or 45) degrees for full 10 seconds   5b. Motor Left Arm: 0=No drift, limb holds 90 (or 45) degrees for full 10 seconds   6a. Motor Right Le=No drift, limb holds 90 (or 45) degrees for full 10 seconds   6b. Motor Left Le=No drift, limb holds 90 (or 45) degrees for full 10 seconds   7. Limb Ataxia:  0=Absent   8. Sensory: 0=Normal; no sensory loss   9. Best Language:  0=No aphasia, normal   10. Dysarthria: 0=Normal articulation   11. Extinction and Inattention (formerly Neglect): 0=No abnormality   Total Score:      Time NIHSS was completed: 11:10 am    Modified Todd Score:  0 (No baseline symptoms/disability)    Lab Results: I have personally reviewed pertinent reports.  , CBC:   Results from last 7 days   Lab Units 24  1050   WBC Thousand/uL 6.95   RBC Million/uL 3.65*   HEMOGLOBIN g/dL 12.2   HEMATOCRIT % 36.8   MCV fL 101*   PLATELETS Thousands/uL 435*   , BMP/CMP:   Results from last 7 days   Lab Units 24  1050   SODIUM mmol/L 135   POTASSIUM mmol/L 4.1   CHLORIDE mmol/L 102   CO2 mmol/L 29   BUN mg/dL 12   CREATININE mg/dL 0.89   CALCIUM mg/dL 9.3   EGFR ml/min/1.73sq m 70   , HgBA1C:   , TSH:   , Coagulation:   Results from last 7 days   Lab Units 24  1050   INR  1.13   , Lipid Profile:   , Urinalysis:       Invalid input(s): \"URIBILINOGEN\"  Imaging Studies: I have personally reviewed pertinent reports.   and I have personally reviewed pertinent films in PACS  EKG, Pathology, and Other Studies: I have personally reviewed pertinent films in PACS with a Radiologist.  VTE Prophylaxis: Sequential compression device (Venodyne)     Code Status: No Order  Advance Directive and Living Will:      Power of :    POLST:      Counseling / " Coordination of Care  Total time spent today 45 minutes. Greater than 50% of total time was spent with the patient and / or family counseling and / or coordination of care. A description of the counseling / coordination of care: discussing with the patient , family and a primary team further plan of care.

## 2024-08-28 NOTE — ASSESSMENT & PLAN NOTE
"- pt presents with left arm tingling, double vision, and slurred speech/word finding activity that began at approximately 730 on morning of admission  - CT head in ED shows no acute intracranial abnormality  -CTA head neck shows: \"No large vessel occlusion, high-grade stenosis, or intracranial aneurysm identified on CT angiogram of the head. No hemodynamically significant stenosis or dissection identified on CT angiogram of the neck.\"  - /57 (Allow permissive HTN).  Keep BP < 220/110  - start ASA 81mg, high-dose statin (Lipitor 40mg qd)  - check MRI brain  - consider Echo with bubble  - PT/OT/speech  - admit on stroke pathway  -lipid panel, A1c, replete metabolites  -placed on telemetry  -consult to neurology; appreciate recommendations    "

## 2024-08-28 NOTE — ASSESSMENT & PLAN NOTE
Blood pressure currently well-controlled at 133/57; however supportive stroke pathway, allow for permissive hypertension  Hold antihypertensives next 24 hours  As needed hydralazine for SBP greater than 220

## 2024-08-28 NOTE — ASSESSMENT & PLAN NOTE
Patient presents from work with strokelike symptoms  In the ED, found to have COVID-19 positive lab  Placed in contact and airborne isolation  Saturating well on room air; placed on mild pathway  Will check CBC and CMP daily  Obtain COVID pathway labs  Start 3-day course of remdesivir  Anticoagulation as per COVID pathway  Ambulation and OOB t.i.d.

## 2024-08-28 NOTE — PLAN OF CARE
Problem: DISCHARGE PLANNING  Goal: Discharge to home or other facility with appropriate resources  Description: INTERVENTIONS:  - Identify barriers to discharge w/patient and caregiver  - Arrange for needed discharge resources and transportation as appropriate  - Identify discharge learning needs (meds, wound care, etc.)  - Arrange for interpretive services to assist at discharge as needed  - Refer to Case Management Department for coordinating discharge planning if the patient needs post-hospital services based on physician/advanced practitioner order or complex needs related to functional status, cognitive ability, or social support system  Outcome: Progressing     Problem: Knowledge Deficit  Goal: Patient/family/caregiver demonstrates understanding of disease process, treatment plan, medications, and discharge instructions  Description: Complete learning assessment and assess knowledge base.  Interventions:  - Provide teaching at level of understanding  - Provide teaching via preferred learning methods  Outcome: Progressing     Problem: NEUROSENSORY - ADULT  Goal: Achieves stable or improved neurological status  Description: INTERVENTIONS  - Monitor and report changes in neurological status  - Monitor vital signs such as temperature, blood pressure, glucose, and any other labs ordered   - Initiate measures to prevent increased intracranial pressure  - Monitor for seizure activity and implement precautions if appropriate      Outcome: Progressing

## 2024-08-28 NOTE — ASSESSMENT & PLAN NOTE
Initiate Lipitor 40 mg daily  ASCVD score unavailable secondary to no lipid labs  Obtain lipid panel

## 2024-08-28 NOTE — CONSULTS
Duplicate order. Please see consult completed by Dr. Haley at the time of the stroke alert. Neurology will continue to follow.

## 2024-08-28 NOTE — H&P
Formerly [facility], [facility]  H&P  Name: Lisa Tamayo 58 y.o. female I MRN: [id number]  Unit/Bed#: E4 -01 I Date of Admission: 8/28/2024   Date of Service: 8/28/2024 I Hospital Day: 0      Assessment & Plan   Dysthymic disorder  Assessment & Plan  Continue home medications: Denies SI/HI    COVID  Assessment & Plan  Patient presents from work with strokelike symptoms  In the ED, found to have COVID-19 positive lab  Placed in contact and airborne isolation  Saturating well on room air; placed on mild pathway  Will check CBC and CMP daily  Obtain COVID pathway labs  Start 3-day course of remdesivir  Anticoagulation as per COVID pathway  Ambulation and OOB t.i.d.      Morbid (severe) obesity due to excess calories (HCC)  Assessment & Plan  - pt with BMI of Body mass index is 45.99 kg/m².  - pt counseled on risks associated with obesity and it's contribution to other health issues  - advise portion control, healthy food choices, drinking water instead of juice/soda  - advise beginning light exercise program (i.e. Walking 30min 4-5x/wk)  - advise keeping food diary to help identify problem foods/times/stressors  - pt advised that weight loss of ~ 1lb/wk is a good goal and not to become frustrated if loss is slower      PAF (paroxysmal atrial fibrillation) (HCC)  Assessment & Plan  Heart rate slightly bradycardic, but stable at 55 bpm on admission  Currently rate controlled with Toprol and flecainide  Anticoagulated with Xarelto  Monitor on telemetry    Mixed hyperlipidemia  Assessment & Plan  Initiate Lipitor 40 mg daily  ASCVD score unavailable secondary to no lipid labs  Obtain lipid panel      Essential hypertension  Assessment & Plan  Blood pressure currently well-controlled at 133/57; however supportive stroke pathway, allow for permissive hypertension  Hold antihypertensives next 24 hours  As needed hydralazine for SBP greater than 220    * TIA (transient ischemic attack)  Assessment & Plan  -  "pt presents with left arm tingling, double vision, and slurred speech/word finding activity that began at approximately 730 on morning of admission  - CT head in ED shows no acute intracranial abnormality  -CTA head neck shows: \"No large vessel occlusion, high-grade stenosis, or intracranial aneurysm identified on CT angiogram of the head. No hemodynamically significant stenosis or dissection identified on CT angiogram of the neck.\"  - /57 (Allow permissive HTN).  Keep BP < 220/110  - start ASA 81mg, high-dose statin (Lipitor 40mg qd)  - check MRI brain  - consider Echo with bubble  - PT/OT/speech  - admit on stroke pathway  -lipid panel, A1c, replete metabolites  -placed on telemetry  -consult to neurology; appreciate recommendations             VTE Pharmacologic Prophylaxis:   Moderate Risk (Score 3-4) - Pharmacological DVT Prophylaxis Ordered: rivaroxaban (Xarelto).  Code Status: Level 1 - Full Code   Discussion with family: Updated  (son and daughter) at bedside.    Anticipated Length of Stay: Patient will be admitted on an inpatient basis with an anticipated length of stay of greater than 2 midnights secondary to TIA.    Total Time Spent on Date of Encounter in care of patient: 60 mins. This time was spent on one or more of the following: performing physical exam; counseling and coordination of care; obtaining or reviewing history; documenting in the medical record; reviewing/ordering tests, medications or procedures; communicating with other healthcare professionals and discussing with patient's family/caregivers.    Chief Complaint: Strokelike symptoms    History of Present Illness:  Lisa Tamayo is a 58 y.o. female with a PMH of dysthymic disorder, hypertension, hyperlipidemia, proximal atrial fibrillation (anticoagulated with Xarelto) who presents with left arm tingling, double vision, and dysarthria.  At approximately 730 on morning of admission, patient was noted to have dysarthria " by work colleagues.  Family picked the patient up and stated that they noticed the symptoms around 10 AM on day of admission.  Patient, on exam, reports arm tingling and double vision have resolved; family at bedside report that word finding and slurred speech have also resolved.  Patient noted to have COVID in the ED and will be placed on mild COVID pathway.  ED provider consulted neurology who recommended that patient be placed on stroke pathway for possible TIA.  Patient denies any other problems such as headaches, nausea/vomiting, fever/chills, shortness of breath, abdominal pain, chest pain, etc.    Review of Systems:  Review of Systems   Constitutional:  Positive for activity change. Negative for chills and fever.   HENT:  Negative for ear pain and sore throat.    Eyes:  Negative for pain and visual disturbance.   Respiratory:  Negative for cough and shortness of breath.    Cardiovascular:  Negative for chest pain and palpitations.   Gastrointestinal:  Negative for abdominal pain and vomiting.   Genitourinary:  Negative for dysuria and hematuria.   Musculoskeletal:  Negative for arthralgias and back pain.   Skin:  Negative for color change and rash.   Neurological:  Positive for speech difficulty and numbness. Negative for seizures and syncope.   All other systems reviewed and are negative.      Past Medical and Surgical History:   Past Medical History:   Diagnosis Date    A-fib (HCC)     Allergic     Anemia     Anxiety     Arthritis     Knees and back     Depression     Fall     Headache(784.0)     Hiatal hernia     Hyperlipidemia     Hypertension     Morbid obesity (HCC)     Obesity     Pneumonia        Past Surgical History:   Procedure Laterality Date    BACK SURGERY  1998    ESOPHAGOGASTRODUODENOSCOPY  10/17/2016    HERNIA REPAIR  10/2016    HIATAL HERNIA REPAIR  10/17/2016    CIRO Tello DO       LUMBAR DISC SURGERY  05/1998    Approx    PARTIAL GASTRECTOMY  10/17/2016    CIRO Tello DO        SLEEVE GASTROPLASTY  10/2016    SPINE SURGERY      25 years ago    VAGINAL DELIVERY      x1       Meds/Allergies:  Prior to Admission medications    Medication Sig Start Date End Date Taking? Authorizing Provider   ALPRAZolam (XANAX) 0.5 mg tablet Take 1 tablet (0.5 mg total) by mouth daily as needed for anxiety 8/26/24  Yes Ann Duarte DO   buPROPion (WELLBUTRIN SR) 100 mg 12 hr tablet TAKE ONE TABLET BY MOUTH TWICE A DAY 7/29/24  Yes Sarah Marie PA-C   buPROPion (Wellbutrin SR) 100 mg 12 hr tablet Take 1 tablet (100 mg total) by mouth 2 (two) times a day 7/29/24  Yes Sarah Marie PA-C   busPIRone (BUSPAR) 7.5 mg tablet Take 1 tablet (7.5 mg total) by mouth 2 (two) times a day 5/31/24  Yes Ann Duarte DO   flecainide (TAMBOCOR) 50 mg tablet TAKE ONE TABLET BY MOUTH TWICE A DAY 6/27/24  Yes Girish Sarah MD   gabapentin (NEURONTIN) 800 mg tablet Take 1 tablet (800 mg total) by mouth 3 (three) times a day 8/25/24  Yes Ann Duarte DO   lisinopril (ZESTRIL) 10 mg tablet TAKE ONE TABLET BY MOUTH EVERY DAY 3/10/24  Yes Ann Duarte DO   metoprolol succinate (TOPROL-XL) 25 mg 24 hr tablet TAKE ONE TABLET BY MOUTH EVERY DAY 12/27/23  Yes Girish Sarah MD   rivaroxaban (Xarelto) 20 mg tablet TAKE ONE TABLET BY MOUTH EVERY DAY 6/27/24  Yes Girish Sarah MD   sertraline (ZOLOFT) 100 mg tablet Take 1.5 tablets (150 mg total) by mouth daily 3/4/24  Yes Ann Duarte DO   tirzepatide (Zepbound) 15 mg/0.5 mL auto-injector Inject 0.5 mL (15 mg total) under the skin once a week 8/25/24  Yes Ann Duarte DO   zolpidem (AMBIEN) 10 mg tablet Take 1 tablet (10 mg total) by mouth daily at bedtime as needed for sleep 8/26/24  Yes Ann Duarte,      I have reviewed home medications using recent Epic encounter.    Allergies:   Allergies   Allergen Reactions    Fentanyl Itching       Social History:  Marital Status:    Occupation:   Patient Pre-hospital Living Situation: Home  Patient Pre-hospital Level of Mobility: walks  Patient  "Pre-hospital Diet Restrictions: None  Substance Use History:   Social History     Substance and Sexual Activity   Alcohol Use Yes    Comment: On occasion … not on a regular basis     Social History     Tobacco Use   Smoking Status Never   Smokeless Tobacco Never     Social History     Substance and Sexual Activity   Drug Use Never       Family History:  Family History   Problem Relation Age of Onset    Arthritis Mother     COPD Mother     Diabetes Mother     Obesity Mother     Coronary artery disease Mother     Coronary artery disease Father     Diabetes Father     Heart attack Father     Heart disease Father     Obesity Father     Early death Father     Alzheimer's disease Paternal Grandfather     Dementia Paternal Grandfather        Physical Exam:     Vitals:   Blood Pressure: (!) 175/90 (08/28/24 1400)  Pulse: 55 (08/28/24 1400)  Temperature: 97.5 °F (36.4 °C) (08/28/24 1300)  Temp Source: Temporal (08/28/24 1300)  Respirations: 18 (08/28/24 1400)  Height: 5' 2\" (157.5 cm) (08/28/24 1215)  Weight - Scale: 116 kg (255 lb 8.2 oz) (08/28/24 1215)  SpO2: 98 % (08/28/24 1400)    Physical Exam  Vitals and nursing note reviewed.   Constitutional:       General: She is not in acute distress.     Appearance: She is well-developed.   HENT:      Head: Normocephalic and atraumatic.   Eyes:      Conjunctiva/sclera: Conjunctivae normal.   Cardiovascular:      Rate and Rhythm: Normal rate.   Pulmonary:      Effort: Pulmonary effort is normal. No respiratory distress.      Breath sounds: Normal breath sounds.   Abdominal:      Palpations: Abdomen is soft.      Tenderness: There is no abdominal tenderness.   Musculoskeletal:         General: No swelling.      Cervical back: Neck supple.   Skin:     General: Skin is warm and dry.   Neurological:      Mental Status: She is alert.   Psychiatric:         Mood and Affect: Mood normal.            Additional Data:     Lab Results:  Results from last 7 days   Lab Units 08/28/24  1050 "   WBC Thousand/uL 6.95   HEMOGLOBIN g/dL 12.2   HEMATOCRIT % 36.8   PLATELETS Thousands/uL 435*     Results from last 7 days   Lab Units 08/28/24  1050   SODIUM mmol/L 135   POTASSIUM mmol/L 4.1   CHLORIDE mmol/L 102   CO2 mmol/L 29   BUN mg/dL 12   CREATININE mg/dL 0.89   ANION GAP mmol/L 4   CALCIUM mg/dL 9.3   GLUCOSE RANDOM mg/dL 84     Results from last 7 days   Lab Units 08/28/24  1050   INR  1.13     Results from last 7 days   Lab Units 08/28/24  1042   POC GLUCOSE mg/dl 85     Lab Results   Component Value Date    HGBA1C 5.7 (H) 04/27/2023    HGBA1C 5.7 04/27/2023           Lines/Drains:  Invasive Devices       Peripheral Intravenous Line  Duration             Peripheral IV 08/28/24 Left Antecubital <1 day                        Imaging: Reviewed radiology reports from this admission including: CT head  CTA stroke alert (head/neck)   Final Result by Ryan Rowland MD (08/28 1111)      No large vessel occlusion, high-grade stenosis, or intracranial aneurysm identified on CT angiogram of the head.      No hemodynamically significant stenosis or dissection identified on CT angiogram of the neck.      Enlarged main pulmonary artery indicative of pulmonary hypertension.         I personally discussed this study with Dr. Haley on 8/28/2024 at 11:09 AM.            Workstation performed: [id number]         CT stroke alert brain   Final Result by Ryan Rowland MD (08/28 1112)      No acute intracranial abnormality.      Findings were directly discussed with Dr. Bertha Gabriel  at approximately 11:04 a.m on 8/28/2024..      Workstation performed: [id number]         MRI Inpatient Order    (Results Pending)       EKG and Other Studies Reviewed on Admission:   EKG: Sinus Bradycardia. HR 58.    ** Please Note: This note has been constructed using a voice recognition system. **

## 2024-08-29 ENCOUNTER — APPOINTMENT (OUTPATIENT)
Dept: CT IMAGING | Facility: HOSPITAL | Age: 59
End: 2024-08-29
Payer: COMMERCIAL

## 2024-08-29 ENCOUNTER — APPOINTMENT (OUTPATIENT)
Dept: NON INVASIVE DIAGNOSTICS | Facility: HOSPITAL | Age: 59
End: 2024-08-29
Payer: COMMERCIAL

## 2024-08-29 LAB
ALBUMIN SERPL BCG-MCNC: 3.6 G/DL (ref 3.5–5)
ALP SERPL-CCNC: 59 U/L (ref 34–104)
ALT SERPL W P-5'-P-CCNC: 9 U/L (ref 7–52)
ANION GAP SERPL CALCULATED.3IONS-SCNC: 5 MMOL/L (ref 4–13)
AORTIC ROOT: 3.1 CM
AORTIC VALVE MEAN VELOCITY: 8.5 M/S
APICAL FOUR CHAMBER EJECTION FRACTION: 61 %
ASCENDING AORTA: 3.4 CM
AST SERPL W P-5'-P-CCNC: 12 U/L (ref 13–39)
AV AREA BY CONTINUOUS VTI: 3.1 CM2
AV AREA PEAK VELOCITY: 2.5 CM2
AV LVOT MEAN GRADIENT: 2 MMHG
AV LVOT PEAK GRADIENT: 4 MMHG
AV MEAN GRADIENT: 3 MMHG
AV PEAK GRADIENT: 8 MMHG
AV VALVE AREA: 3.1 CM2
AV VELOCITY RATIO: 0.73
BASOPHILS # BLD AUTO: 0.03 THOUSANDS/ÂΜL (ref 0–0.1)
BASOPHILS NFR BLD AUTO: 1 % (ref 0–1)
BILIRUB SERPL-MCNC: 0.25 MG/DL (ref 0.2–1)
BSA FOR ECHO PROCEDURE: 2.12 M2
BUN SERPL-MCNC: 18 MG/DL (ref 5–25)
CALCIUM SERPL-MCNC: 8.9 MG/DL (ref 8.4–10.2)
CHLORIDE SERPL-SCNC: 107 MMOL/L (ref 96–108)
CHOLEST SERPL-MCNC: 273 MG/DL
CO2 SERPL-SCNC: 28 MMOL/L (ref 21–32)
CREAT SERPL-MCNC: 0.73 MG/DL (ref 0.6–1.3)
DOP CALC AO PEAK VEL: 1.38 M/S
DOP CALC AO VTI: 30.88 CM
DOP CALC LVOT AREA: 3.46 CM2
DOP CALC LVOT CARDIAC INDEX: 2.56 L/MIN/M2
DOP CALC LVOT CARDIAC OUTPUT: 5.42 L/MIN
DOP CALC LVOT DIAMETER: 2.1 CM
DOP CALC LVOT PEAK VEL VTI: 27.65 CM
DOP CALC LVOT PEAK VEL: 1.01 M/S
DOP CALC LVOT STROKE INDEX: 44.8 ML/M2
DOP CALC LVOT STROKE VOLUME: 95.72
E WAVE DECELERATION TIME: 151 MS
E/A RATIO: 1.48
EOSINOPHIL # BLD AUTO: 0.09 THOUSAND/ÂΜL (ref 0–0.61)
EOSINOPHIL NFR BLD AUTO: 2 % (ref 0–6)
ERYTHROCYTE [DISTWIDTH] IN BLOOD BY AUTOMATED COUNT: 15.3 % (ref 11.6–15.1)
EST. AVERAGE GLUCOSE BLD GHB EST-MCNC: 105 MG/DL
FRACTIONAL SHORTENING: 23 (ref 28–44)
GFR SERPL CREATININE-BSD FRML MDRD: 89 ML/MIN/1.73SQ M
GLUCOSE SERPL-MCNC: 86 MG/DL (ref 65–140)
HBA1C MFR BLD: 5.3 %
HCT VFR BLD AUTO: 33.7 % (ref 34.8–46.1)
HDLC SERPL-MCNC: 38 MG/DL
HGB BLD-MCNC: 11.3 G/DL (ref 11.5–15.4)
IMM GRANULOCYTES # BLD AUTO: 0.03 THOUSAND/UL (ref 0–0.2)
IMM GRANULOCYTES NFR BLD AUTO: 1 % (ref 0–2)
INTERVENTRICULAR SEPTUM IN DIASTOLE (PARASTERNAL SHORT AXIS VIEW): 1.1 CM
INTERVENTRICULAR SEPTUM: 1.1 CM (ref 0.6–1.1)
IVC: 19 MM
LAAS-AP2: 25.4 CM2
LAAS-AP4: 28.4 CM2
LDLC SERPL CALC-MCNC: 196 MG/DL (ref 0–100)
LEFT ATRIUM SIZE: 3.5 CM
LEFT ATRIUM VOLUME (MOD BIPLANE): 91 ML
LEFT ATRIUM VOLUME INDEX (MOD BIPLANE): 42.9 ML/M2
LEFT INTERNAL DIMENSION IN SYSTOLE: 4.1 CM (ref 2.1–4)
LEFT VENTRICULAR INTERNAL DIMENSION IN DIASTOLE: 5.3 CM (ref 3.5–6)
LEFT VENTRICULAR POSTERIOR WALL IN END DIASTOLE: 1 CM
LEFT VENTRICULAR STROKE VOLUME: 63 ML
LVSV (TEICH): 63 ML
LYMPHOCYTES # BLD AUTO: 1.43 THOUSANDS/ÂΜL (ref 0.6–4.47)
LYMPHOCYTES NFR BLD AUTO: 26 % (ref 14–44)
MAGNESIUM SERPL-MCNC: 2 MG/DL (ref 1.9–2.7)
MCH RBC QN AUTO: 33.1 PG (ref 26.8–34.3)
MCHC RBC AUTO-ENTMCNC: 33.5 G/DL (ref 31.4–37.4)
MCV RBC AUTO: 99 FL (ref 82–98)
MONOCYTES # BLD AUTO: 0.65 THOUSAND/ÂΜL (ref 0.17–1.22)
MONOCYTES NFR BLD AUTO: 12 % (ref 4–12)
MV E'TISSUE VEL-LAT: 10 CM/S
MV E'TISSUE VEL-SEP: 12 CM/S
MV PEAK A VEL: 0.54 M/S
MV PEAK E VEL: 80 CM/S
MV STENOSIS PRESSURE HALF TIME: 44 MS
MV VALVE AREA P 1/2 METHOD: 5
NEUTROPHILS # BLD AUTO: 3.36 THOUSANDS/ÂΜL (ref 1.85–7.62)
NEUTS SEG NFR BLD AUTO: 58 % (ref 43–75)
NRBC BLD AUTO-RTO: 0 /100 WBCS
PHOSPHATE SERPL-MCNC: 3.3 MG/DL (ref 2.3–4.1)
PLATELET # BLD AUTO: 375 THOUSANDS/UL (ref 149–390)
PMV BLD AUTO: 9.5 FL (ref 8.9–12.7)
POTASSIUM SERPL-SCNC: 4.3 MMOL/L (ref 3.5–5.3)
PROT SERPL-MCNC: 6.2 G/DL (ref 6.4–8.4)
RA PRESSURE ESTIMATED: 3 MMHG
RBC # BLD AUTO: 3.41 MILLION/UL (ref 3.81–5.12)
RIGHT ATRIAL 2D VOLUME: 85 ML
RIGHT ATRIUM AREA SYSTOLE A4C: 25.3 CM2
RIGHT VENTRICLE ID DIMENSION: 4.1 CM
RV PSP: 38 MMHG
SL CV LEFT ATRIUM LENGTH A2C: 6.5 CM
SL CV LV EF: 61
SL CV PED ECHO LEFT VENTRICLE DIASTOLIC VOLUME (MOD BIPLANE) 2D: 137 ML
SL CV PED ECHO LEFT VENTRICLE SYSTOLIC VOLUME (MOD BIPLANE) 2D: 74 ML
SODIUM SERPL-SCNC: 140 MMOL/L (ref 135–147)
TR MAX PG: 35 MMHG
TR PEAK VELOCITY: 3 M/S
TRICUSPID ANNULAR PLANE SYSTOLIC EXCURSION: 3.2 CM
TRICUSPID VALVE PEAK REGURGITATION VELOCITY: 2.95 M/S
TRIGL SERPL-MCNC: 194 MG/DL
WBC # BLD AUTO: 5.59 THOUSAND/UL (ref 4.31–10.16)

## 2024-08-29 PROCEDURE — 97165 OT EVAL LOW COMPLEX 30 MIN: CPT

## 2024-08-29 PROCEDURE — 99232 SBSQ HOSP IP/OBS MODERATE 35: CPT | Performed by: PHYSICIAN ASSISTANT

## 2024-08-29 PROCEDURE — 93306 TTE W/DOPPLER COMPLETE: CPT | Performed by: INTERNAL MEDICINE

## 2024-08-29 PROCEDURE — 93306 TTE W/DOPPLER COMPLETE: CPT

## 2024-08-29 PROCEDURE — 70450 CT HEAD/BRAIN W/O DYE: CPT

## 2024-08-29 PROCEDURE — 83036 HEMOGLOBIN GLYCOSYLATED A1C: CPT | Performed by: INTERNAL MEDICINE

## 2024-08-29 PROCEDURE — 80053 COMPREHEN METABOLIC PANEL: CPT | Performed by: INTERNAL MEDICINE

## 2024-08-29 PROCEDURE — 84100 ASSAY OF PHOSPHORUS: CPT | Performed by: INTERNAL MEDICINE

## 2024-08-29 PROCEDURE — 99204 OFFICE O/P NEW MOD 45 MIN: CPT | Performed by: PSYCHIATRY & NEUROLOGY

## 2024-08-29 PROCEDURE — 83735 ASSAY OF MAGNESIUM: CPT | Performed by: INTERNAL MEDICINE

## 2024-08-29 PROCEDURE — 85025 COMPLETE CBC W/AUTO DIFF WBC: CPT | Performed by: INTERNAL MEDICINE

## 2024-08-29 RX ORDER — DIAZEPAM 10 MG/2ML
5 INJECTION, SOLUTION INTRAMUSCULAR; INTRAVENOUS ONCE AS NEEDED
Status: COMPLETED | OUTPATIENT
Start: 2024-08-29 | End: 2024-08-30

## 2024-08-29 RX ORDER — DIAZEPAM 10 MG/2ML
10 INJECTION, SOLUTION INTRAMUSCULAR; INTRAVENOUS ONCE
Status: DISCONTINUED | OUTPATIENT
Start: 2024-08-29 | End: 2024-08-29

## 2024-08-29 RX ORDER — DIAZEPAM 10 MG/2ML
10 INJECTION, SOLUTION INTRAMUSCULAR; INTRAVENOUS ONCE
Status: DISCONTINUED | OUTPATIENT
Start: 2024-08-29 | End: 2024-08-31 | Stop reason: HOSPADM

## 2024-08-29 RX ADMIN — ACETAMINOPHEN 650 MG: 325 TABLET ORAL at 19:54

## 2024-08-29 RX ADMIN — METOPROLOL SUCCINATE 25 MG: 25 TABLET, EXTENDED RELEASE ORAL at 09:05

## 2024-08-29 RX ADMIN — BUPROPION HYDROCHLORIDE 150 MG: 150 TABLET, EXTENDED RELEASE ORAL at 09:05

## 2024-08-29 RX ADMIN — RIVAROXABAN 20 MG: 20 TABLET, FILM COATED ORAL at 09:06

## 2024-08-29 RX ADMIN — BUSPIRONE HYDROCHLORIDE 7.5 MG: 5 TABLET ORAL at 17:49

## 2024-08-29 RX ADMIN — FLECAINIDE ACETATE 50 MG: 50 TABLET ORAL at 22:12

## 2024-08-29 RX ADMIN — GABAPENTIN 800 MG: 400 CAPSULE ORAL at 22:12

## 2024-08-29 RX ADMIN — BUSPIRONE HYDROCHLORIDE 7.5 MG: 5 TABLET ORAL at 09:06

## 2024-08-29 RX ADMIN — ACETAMINOPHEN 650 MG: 325 TABLET ORAL at 14:38

## 2024-08-29 RX ADMIN — GABAPENTIN 800 MG: 400 CAPSULE ORAL at 17:49

## 2024-08-29 RX ADMIN — ASPIRIN 81 MG CHEWABLE TABLET 81 MG: 81 TABLET CHEWABLE at 09:05

## 2024-08-29 RX ADMIN — ZOLPIDEM TARTRATE 10 MG: 5 TABLET, COATED ORAL at 22:11

## 2024-08-29 RX ADMIN — ATORVASTATIN CALCIUM 40 MG: 40 TABLET, FILM COATED ORAL at 17:49

## 2024-08-29 RX ADMIN — FLECAINIDE ACETATE 50 MG: 50 TABLET ORAL at 13:12

## 2024-08-29 RX ADMIN — GABAPENTIN 800 MG: 400 CAPSULE ORAL at 09:05

## 2024-08-29 RX ADMIN — REMDESIVIR 100 MG: 100 INJECTION, POWDER, LYOPHILIZED, FOR SOLUTION INTRAVENOUS at 13:12

## 2024-08-29 RX ADMIN — SERTRALINE HYDROCHLORIDE 150 MG: 100 TABLET ORAL at 09:05

## 2024-08-29 NOTE — PROGRESS NOTES
"[facility]  Progress Note  Name: Lisa Tamayo I  MRN: [id number]  Unit/Bed#: E4 -01 I Date of Admission: 8/28/2024   Date of Service: 8/29/2024 I Hospital Day: 0    Assessment & Plan   * TIA (transient ischemic attack)  Assessment & Plan  Presented with left arm tingling, double vision, and slurred speech/word finding activity that began at approximately 7:30 on morning of admission  CT head:no acute intracranial abnormality  CTA head neck: \"No large vessel occlusion, high-grade stenosis, or intracranial aneurysm identified on CT angiogram of the head. No hemodynamically significant stenosis or dissection identified on CT angiogram of the neck.\"  Allow permissive HTN Keep BP < 220/110  Started ASA 81mg, statin (Lipitor 40mg qd)  MRI brain pending  Echocardiogram: EF 61%, diastolic function normal  At baseline in regards to in regards to PT/OT  Continue to monitor on telemetry  Neurology following  Await additional imaging for further neurology recommendations    COVID  Assessment & Plan  Patient presents from work with strokelike symptoms  In the ED, found to have test positive for COVID   Placed in contact and airborne isolation  Saturating well on room air; placed on mild pathway  Check CBC and CMP daily  Start 3-day course of remdesivir #2 out of 3  Already on anticoagulation  Ambulation and OOB t.i.d.    Morbid (severe) obesity due to excess calories (HCC)  Assessment & Plan  - pt with BMI of Body mass index is 46.64 kg/m².  - pt counseled on risks associated with obesity and it's contribution to other health issues  - advise portion control, healthy food choices, drinking water instead of juice/soda  - advise beginning light exercise program (i.e. Walking 30min 4-5x/wk)  - advise keeping food diary to help identify problem foods/times/stressors  - pt advised that weight loss of ~ 1lb/wk is a good goal and not to become frustrated if loss is slower    PAF (paroxysmal atrial " fibrillation) (HCC)  Assessment & Plan  Heart rate slightly bradycardic, but stable at 55 bpm on admission  Currently rate controlled with Toprol and flecainide  Anticoagulated with Xarelto    Mixed hyperlipidemia  Assessment & Plan  Initiated Lipitor 40 mg daily in the setting of stroke pathway  Lipid panel obtained here and cholesterol 273, triglycerides 194, HDL 38     Essential hypertension  Assessment & Plan  Home regimen metoprolol succinate 25 mg daily, lisinopril 10 mg daily  Blood pressure medications was held in the setting of stroke pathway  Continue to monitor pressures and resume as able.    Dysthymic disorder  Assessment & Plan  Maintained on BuSpar, Ambien, Zoloft, Xanax as needed        VTE Pharmacologic Prophylaxis:   Pharmacologic: Rivaroxaban (Xarelto)  Mechanical VTE Prophylaxis in Place: Yes    AM-PAC Basic Mobility:  Basic Mobility Inpatient Raw Score: 24  JH-HLM Achieved: 8: Walk 250 feet ot more  JH-HLM Goal: 8: Walk 250 feet or more    Discharge Plan: With need for continued inpatient stay for pending MRI and COVID with treatment of remdesivir    Discussions with Specialists or Other Care Team Provider: Nursing    Education and Discussions with Family / Patient: Patient    Time Spent for Care: This time was spent on one or more of the following: performing physical exam; counseling and coordination of care; obtaining or reviewing history; documenting in the medical record; reviewing/ordering tests, medications, or procedures; communicating with other healthcare professionals and discussing with patient's family/caregivers.    Current Length of Stay: 0 day(s)  Current Patient Status: Observation   Code Status: Level 1 - Full Code    Subjective:   Patient resting in bed.  She reports having a headache.  She is anxiously awaiting MRI.  She does report her strokelike symptoms have completely resolved and she is speaking as she normally would.  Patient lives at home alone.  She denies  alcohol consumption or smoking cigarettes.    Objective:     Vitals:   Temp (24hrs), Av °F (36.7 °C), Min:97.7 °F (36.5 °C), Max:98.3 °F (36.8 °C)    Temp:  [97.7 °F (36.5 °C)-98.3 °F (36.8 °C)] 98.3 °F (36.8 °C)  HR:  [54-60] 60  Resp:  [16-18] 16  BP: (101-145)/(66-82) 140/82  SpO2:  [97 %-99 %] 98 %  Body mass index is 46.64 kg/m².     Input and Output Summary (last 24 hours):       Intake/Output Summary (Last 24 hours) at 2024 1535  Last data filed at 2024 1100  Gross per 24 hour   Intake 240 ml   Output --   Net 240 ml       Physical Exam:     Physical Exam  Vitals and nursing note reviewed.   Constitutional:       General: She is not in acute distress.     Appearance: She is obese. She is not ill-appearing, toxic-appearing or diaphoretic.   HENT:      Head: Normocephalic and atraumatic.   Eyes:      General: No scleral icterus.  Cardiovascular:      Rate and Rhythm: Normal rate and regular rhythm.   Pulmonary:      Effort: Pulmonary effort is normal. No respiratory distress.      Breath sounds: Normal breath sounds. No stridor. No wheezing or rhonchi.   Abdominal:      General: Bowel sounds are normal. There is no distension.      Palpations: Abdomen is soft. There is no mass.      Tenderness: There is no abdominal tenderness.      Hernia: No hernia is present.   Musculoskeletal:         General: No swelling.      Cervical back: Neck supple.   Skin:     General: Skin is warm and dry.   Neurological:      Mental Status: She is alert and oriented to person, place, and time. Mental status is at baseline.   Psychiatric:         Mood and Affect: Mood normal.         Behavior: Behavior normal.         Additional Data:     Labs:    Results from last 7 days   Lab Units 24  0619   WBC Thousand/uL 5.59   HEMOGLOBIN g/dL 11.3*   HEMATOCRIT % 33.7*   PLATELETS Thousands/uL 375   SEGS PCT % 58   LYMPHO PCT % 26   MONO PCT % 12   EOS PCT % 2     Results from last 7 days   Lab Units 24    POTASSIUM mmol/L 4.3   CHLORIDE mmol/L 107   CO2 mmol/L 28   BUN mg/dL 18   CREATININE mg/dL 0.73   CALCIUM mg/dL 8.9   ALK PHOS U/L 59   ALT U/L 9   AST U/L 12*     Results from last 7 days   Lab Units 08/28/24  1050   INR  1.13       * I Have Reviewed All Lab Data Listed Above.  * Additional Pertinent Lab Tests Reviewed: All Labs For Current Hospital Admission Reviewed    Imaging:    Imaging Reports Reviewed Today Include: echo  Imaging Personally Reviewed by Myself Includes:      Recent Cultures (last 7 days):           Lines/Drains:  Invasive Devices       Peripheral Intravenous Line  Duration             Peripheral IV 08/28/24 Left Antecubital 1 day                    Last 24 Hours Medication List:   Current Facility-Administered Medications   Medication Dose Route Frequency Provider Last Rate    acetaminophen  650 mg Oral Q4H PRN Luis Adler MD      ALPRAZolam  0.5 mg Oral Daily PRN Luis Adler MD      aspirin  81 mg Oral Daily Luis Adler MD      atorvastatin  40 mg Oral QPM Luis Adler MD      buPROPion  150 mg Oral Daily Luis Adler MD      busPIRone  7.5 mg Oral BID Luis Adler MD      diazepam  10 mg Intravenous Once Aria Tarca, CRNP      diazepam  5 mg Intravenous Once PRN Aria Tarca, CRNP      docusate sodium  100 mg Oral BID Luis Adler MD      flecainide  50 mg Oral BID Luis Adler MD      gabapentin  800 mg Oral TID Luis Adler MD      hydrALAZINE  10 mg Intravenous Q6H PRN Luis Adler MD      metoprolol succinate  25 mg Oral Daily Luis Adler MD      ondansetron  4 mg Intravenous Q6H PRN Luis Adler MD      remdesivir  100 mg Intravenous Q24H Luis Adler MD      rivaroxaban  20 mg Oral Daily With Breakfast Lusi Adler MD      sertraline  150 mg Oral Daily Luis Adler MD      zolpidem  10 mg Oral HS Luis Adler MD          Today, Patient Was Seen By: Syeda Pepper PA-C    ** Please Note: This note has been constructed using a voice recognition system. **

## 2024-08-29 NOTE — ASSESSMENT & PLAN NOTE
Patient is doing well from infection standpoint  Remdesivir started, question neurologic manifestation might be due to COVID -19 related hypercoagulability, continue aspirin 81 and Xarelto as originally prescribed

## 2024-08-29 NOTE — OCCUPATIONAL THERAPY NOTE
Occupational Therapy Evaluation     Patient Name: Lisa Tamayo  Today's Date: 8/29/2024  Problem List  Principal Problem:    TIA (transient ischemic attack)  Active Problems:    Dysthymic disorder    Essential hypertension    Mixed hyperlipidemia    PAF (paroxysmal atrial fibrillation) (HCC)    Morbid (severe) obesity due to excess calories (HCC)    COVID    Past Medical History  Past Medical History:   Diagnosis Date    A-fib (HCC)     Allergic     Anemia     Anxiety     Arthritis     Knees and back     Depression     Fall     Headache(784.0)     Hiatal hernia     Hyperlipidemia     Hypertension     Morbid obesity (HCC)     Obesity     Pneumonia      Past Surgical History  Past Surgical History:   Procedure Laterality Date    BACK SURGERY  1998    ESOPHAGOGASTRODUODENOSCOPY  10/17/2016    HERNIA REPAIR  10/2016    HIATAL HERNIA REPAIR  10/17/2016    CIRO Tello DO       LUMBAR DISC SURGERY  05/1998    Approx    PARTIAL GASTRECTOMY  10/17/2016    CIRO Tello DO       SLEEVE GASTROPLASTY  10/2016    SPINE SURGERY      25 years ago    VAGINAL DELIVERY      x1           08/29/24 0840   OT Last Visit   OT Visit Date 08/29/24   Note Type   Note type Evaluation   Pain Assessment   Pain Assessment Tool 0-10   Pain Score No Pain   Restrictions/Precautions   Weight Bearing Precautions Per Order No   Other Precautions Contact/isolation;Airborne/isolation;Telemetry   Home Living   Type of Home Apartment   Home Layout One level  (1 MIGUEL A)   Bathroom Shower/Tub Tub/shower unit   Bathroom Toilet Standard   Home Equipment Cane   Additional Comments Pt lives alone in a one level apt with 1 MIGUEL A.   Prior Function   Level of [place] Independent with ADLs;Independent with functional mobility;Independent with IADLS   Lives With Alone   IADLs Independent with driving;Independent with meal prep;Independent with medication management   Falls in the last 6 months 0   Vocational Full time employment   Comments At  baseline, pt was I w/ ADLs, IADLs, and functional transfers/mobility w/o use of AD. (+) . FT employed. Denies falls PTA.   Lifestyle   Autonomy At baseline, pt was I w/ ADLs, IADLs, and functional transfers/mobility w/o use of AD. (+) . FT employed. Denies falls PTA.   Service to Others FT employed   ADL   Where Assessed Edge of bed   Eating Assistance 7  Independent   Grooming Assistance 7  Independent   UB Bathing Assistance 7  Independent   LB Bathing Assistance 6  Modified Independent   UB Dressing Assistance 7  Independent   LB Dressing Assistance 6  Modified independent   Toileting Assistance  7  Independent   Bed Mobility   Supine to Sit 6  Modified independent   Additional items HOB elevated;Bedrails;Increased time required   Sit to Supine Unable to assess   Additional Comments Pt seated EOB at end of session. Call bell and phone within reach. All needs met and pt reports no further questions for OT at this time.   Transfers   Sit to Stand 7  Independent   Stand to Sit 7  Independent   Functional Mobility   Functional Mobility 7  Independent   Additional Comments w/o use of AD   Balance   Static Sitting Normal   Dynamic Sitting Good   Static Standing Fair +   Dynamic Standing Fair +   Ambulatory Fair +   Activity Tolerance   Activity Tolerance Patient tolerated treatment well   RUE Assessment   RUE Assessment WFL  (5/5 throughout)   LUE Assessment   LUE Assessment WFL  (5/5 throughout)   Hand Function   Gross Motor Coordination Functional   Fine Motor Coordination Functional   Sensation   Light Touch No apparent deficits   Proprioception   Proprioception No apparent deficits   Vision-Basic Assessment   Current Vision Wears glasses all the time   Vision - Complex Assessment   Ocular Range of Motion Intact   Acuity Able to read clock/calendar on wall without difficulty;Able to read employee name badge without difficulty   Psychosocial   Psychosocial (WDL) WDL   Perception   Inattention/Neglect  Appears intact   Cognition   Overall Cognitive Status WFL   Arousal/Participation Alert;Cooperative   Attention Within functional limits   Orientation Level Oriented X4   Memory Within functional limits   Following Commands Follows all commands and directions without difficulty   Assessment   Prognosis Good   Assessment Pt is a 58 y.o. female seen for OT evaluation s/p adm to [facility] on 8/28/2024 w/ TIA. Comorbidities affecting pt’s functional performance include a significant PMH of dysthymic disorder, HTN, HLD, A-Fib. Pt with active OT orders and activity orders for Up and OOB as tolerated. Pt lives alone in a one level apt with 1 MIGUEL A. At baseline, pt was I w/ ADLs, IADLs, and functional transfers/mobility w/o use of AD. (+) . FT employed. Denies falls PTA. Upon evaluation, pt currently functioning at a Mod I level for ADLs, Mod I for bed mobility, and Independent for functional mobility/transfers 2* the following deficits impacting occupational performance: limited functional reach. Pt with the following personal factors of: MIGUEL A home environment and limited home support. Despite above mentioned deficits and personal factors, pt is functioning near baseline level of performance. Limited ADL deficits. No further acute OT needs identified at this time. Recommend continued mobilization with hospital staff and restorative program while in the hospital to increase pt’s endurance and strength upon D/C. The patient's raw score on the AM-PAC Daily Activity Inpatient Short Form is 24. A raw score of greater than or equal to 19 suggests the patient may benefit from discharge to home. Please refer to the recommendation of the Occupational Therapist for safe discharge planning. D/C pt from OT caseload at this time.   Plan   OT Frequency Eval only  (D/C OT)   Discharge Recommendation   Rehab Resource Intensity Level, OT No post-acute rehabilitation needs   AM-PAC Daily Activity Inpatient   Lower Body  Dressing 4   Bathing 4   Toileting 4   Upper Body Dressing 4   Grooming 4   Eating 4   Daily Activity Raw Score 24   Daily Activity Standardized Score (Calc for Raw Score >=11) 57.54   AM-PAC Applied Cognition Inpatient   Following a Speech/Presentation 4   Understanding Ordinary Conversation 4   Taking Medications 4   Remembering Where Things Are Placed or Put Away 4   Remembering List of 4-5 Errands 4   Taking Care of Complicated Tasks 4   Applied Cognition Raw Score 24   Applied Cognition Standardized Score 62.21       Kristin Whitney, OTR/L

## 2024-08-29 NOTE — ASSESSMENT & PLAN NOTE
Initiated Lipitor 40 mg daily in the setting of stroke pathway  Lipid panel obtained here and cholesterol 273, triglycerides 194, HDL 38

## 2024-08-29 NOTE — ASSESSMENT & PLAN NOTE
Heart rate slightly bradycardic, but stable at 55 bpm on admission  Currently rate controlled with Toprol and flecainide  Anticoagulated with Xarelto

## 2024-08-29 NOTE — PHYSICAL THERAPY NOTE
PHYSICAL THERAPY NOTE          Patient Name: Lisa Tamayo  Today's Date: 8/29/2024 08/29/24 1104   PT Last Visit   PT Visit Date 08/29/24   Note Type   Note type Screen   Additional Comments PT consult received. Chart reviewed. Per OT, pt functioning at baseline. Pt completely I w/ overall functional mobility. PLease see OT eval for details. PT SCREEN complete. Will D/C PT.     Erick Rodgers

## 2024-08-29 NOTE — ASSESSMENT & PLAN NOTE
"Presented with left arm tingling, double vision, and slurred speech/word finding activity that began at approximately 7:30 on morning of admission  CT head:no acute intracranial abnormality  CTA head neck: \"No large vessel occlusion, high-grade stenosis, or intracranial aneurysm identified on CT angiogram of the head. No hemodynamically significant stenosis or dissection identified on CT angiogram of the neck.\"  Allow permissive HTN Keep BP < 220/110  Started ASA 81mg, statin (Lipitor 40mg qd)  MRI brain pending  Echocardiogram: EF 61%, diastolic function normal  At baseline in regards to in regards to PT/OT  Continue to monitor on telemetry  Neurology following  Await additional imaging for further neurology recommendations  "

## 2024-08-29 NOTE — ASSESSMENT & PLAN NOTE
Home regimen metoprolol succinate 25 mg daily, lisinopril 10 mg daily  Blood pressure medications was held in the setting of stroke pathway  Continue to monitor pressures and resume as able.

## 2024-08-29 NOTE — CONSULTS
Progress Note - Neurology   Lisa Tamayo 58 y.o. female MRN: [id number]  Unit/Bed#: E4 -01 Encounter: [id number]      Assessment & Plan     COVID  Assessment & Plan  Patient is doing well from infection standpoint  Remdesivir started, question neurologic manifestation might be due to COVID -19 related hypercoagulability, continue aspirin 81 and Xarelto as originally prescribed    * TIA (transient ischemic attack)  Assessment & Plan  Mrs. Tamayo has presented to [facility] ED for evaluation of double vision, dysarthric speech and left arm sensory dysfunction which started 730 am 8/28/2024.  Stroke alert was called at 10:49 am as at that time patient described near complete resolution of dysarthric speech and her other symptoms.    CT scan of the head and CT angiogram head and neck was personally discussed with radiology, patient has no acute intracranial pathology, no large vessel occlusion.    Patient was not a candidate for TNK due to improving symptoms and being anticoagulated for A-fib.    Patient has COVID 19 infection and she is on remdesivir.    NO new focal neurologic deficit described since last evaluation.     PLAN:  Continue Stroke pathway:    · Recommended MRI brain is pending, premedication with Diazepam 10 mg IV advised due to claustrophobia     · Echo done and with no acute pathology, LVEF 61 %    · Recommend the following Labs completed :  mg/dL with a goal <70    · Recommend continue aspirin 81 mg and continue Xarelto    · Recommend atorvastatin 40 mg daily, again with a goal of LDL <70 mg/dL    · Permissive HTN, allow for SBP up to 220 x 24 hours or Goal MAP> 100 from onset of stroke like symptoms, then goal normotension. Goal normotension sooner if MRI brain completed and does not reveal acute infarct.    · Euglycemic, normothermic goal    · Continue telemetry.    · PT/OT/ST    · Stroke education    · Frequent neuro checks. Continue to monitor and notify neurology with  "any changes.    · STAT CT head for any acute change in neuro exam    Medical management and supportive care per primary team. Correction of any metabolic or infectious disturbances.  Patient describes gradually improving headache, patient may benefit from Tylenol 1 g if clinically indicated.              Lisa Tamayo will not need outpatient follow up with Neurology. She will not require outpatient neurological testing.    Subjective:   Patient did not have any new focal neurologic deficit overnight.  Patient was diagnosed with COVID 19 and was started Remdesivir  Patient has been taking aspirin 81 mg with Xarelto 20 mg    ECHO: Left Ventricle: Left ventricular cavity size is normal. Wall thickness is normal. The left ventricular ejection fraction is 61% by single dimension measurement. Systolic function is normal. Wall motion is normal. Diastolic function is normal.  Left atrial filling pressure is normal.    Right Ventricle: Right ventricular cavity size is mildly dilated.    Left Atrium: The atrium is moderately dilated (42-48 mL/m2).    Right Atrium: The atrium is mildly dilated.    Tricuspid Valve: There is mild regurgitation. The right ventricular systolic pressure is mildly elevated. The estimated right ventricular systolic pressure is 38.00 mmHg.    MRI is still pending    ROS:  Negative except mild headaches   Slurred speech    Vitals: Blood pressure 140/82, pulse 60, temperature 98.3 °F (36.8 °C), temperature source Temporal, resp. rate 16, height 5' 2\" (1.575 m), weight 116 kg (255 lb), SpO2 98%.,Body mass index is 46.64 kg/m².    Physical Exam: /68 (BP Location: Right arm)   Pulse 58   Temp 98 °F (36.7 °C) (Temporal)   Resp 18   Ht 5' 2\" (1.575 m)   Wt 116 kg (255 lb)   SpO2 97%   BMI 46.64 kg/m²     General Appearance:    Alert, cooperative, no distress, appears stated age   Head:    Normocephalic, without obvious abnormality, atraumatic   Eyes:    PERRL, conjunctiva/corneas clear, " EOM's intact, fundi     benign, both eyes   Ears:    Normal TM's and external ear canals, both ears   Nose:   Nares normal, septum midline, mucosa normal, no drainage    or sinus tenderness   Throat:   Lips, mucosa, and tongue normal; teeth and gums normal   Neck:   Supple, symmetrical, trachea midline, no adenopathy;     thyroid:  no enlargement/tenderness/nodules; no carotid    bruit or JVD   Back:     Symmetric, no curvature, ROM normal, no CVA tenderness   Lungs:     Clear to auscultation bilaterally, respirations unlabored   Chest Wall:    No tenderness or deformity    Heart:    Regular rate and rhythm, S1 and S2 normal, no murmur, rub   or gallop   Breast Exam:    No tenderness, masses, or nipple abnormality   Abdomen:     Soft, non-tender, bowel sounds active all four quadrants,     no masses, no organomegaly   Genitalia:    Normal female without lesion, discharge or tenderness   Rectal:    Normal tone, no masses or tenderness; guaiac negative stool   Extremities:   Extremities normal, atraumatic, no cyanosis or edema   Pulses:   2+ and symmetric all extremities   Skin:   Skin color, texture, turgor normal, no rashes or lesions   Lymph nodes:   Cervical, supraclavicular, and axillary nodes normal   Neurologic:   CNII-XII intact, normal strength, sensation and reflexes     throughout       Lab, Imaging and other studies: I have personally reviewed pertinent reports.  , CBC:   Results from last 7 days   Lab Units 08/29/24  0619 08/28/24  1050   WBC Thousand/uL 5.59 6.95   RBC Million/uL 3.41* 3.65*   HEMOGLOBIN g/dL 11.3* 12.2   HEMATOCRIT % 33.7* 36.8   MCV fL 99* 101*   PLATELETS Thousands/uL 375 435*   , BMP/CMP:   Results from last 7 days   Lab Units 08/29/24  0619 08/28/24  1050   SODIUM mmol/L 140 135   POTASSIUM mmol/L 4.3 4.1   CHLORIDE mmol/L 107 102   CO2 mmol/L 28 29   BUN mg/dL 18 12   CREATININE mg/dL 0.73 0.89   CALCIUM mg/dL 8.9 9.3   AST U/L 12*  --    ALT U/L 9  --    ALK PHOS U/L 59  --   "  EGFR ml/min/1.73sq m 89 70   , Vitamin B12:   , HgBA1C:   Results from last 7 days   Lab Units 08/29/24  0619   HEMOGLOBIN A1C % 5.3   , TSH:   Results from last 7 days   Lab Units 08/28/24  1050   TSH 3RD GENERATON uIU/mL 2.318   , Coagulation:   Results from last 7 days   Lab Units 08/28/24  1050   INR  1.13   , Lipid Profile:   Results from last 7 days   Lab Units 08/28/24  1050   HDL mg/dL 38*   LDL CALC mg/dL 196*   TRIGLYCERIDES mg/dL 194*   , Ammonia:   , Urinalysis:       Invalid input(s): \"URIBILINOGEN\", Medication Drug Levels:       Invalid input(s): \"CARBAMAZEPINE\", \"OXCARBAZEPINE\"  VTE Prophylaxis: Sequential compression device (Venodyne)     Counseling / Coordination of Care  Total time spent today 10 minutes. Greater than 50% of total time was spent with the patient and / or family counseling and / or coordination of care. A description of the counseling / coordination of care: coordinating imaging studies while in the hospital.       "

## 2024-08-29 NOTE — ASSESSMENT & PLAN NOTE
Patient presents from work with strokelike symptoms  In the ED, found to have test positive for COVID   Placed in contact and airborne isolation  Saturating well on room air; placed on mild pathway  Check CBC and CMP daily  Start 3-day course of remdesivir #2 out of 3  Already on anticoagulation  Ambulation and OOB t.i.d.

## 2024-08-29 NOTE — ASSESSMENT & PLAN NOTE
- pt with BMI of Body mass index is 46.64 kg/m².  - pt counseled on risks associated with obesity and it's contribution to other health issues  - advise portion control, healthy food choices, drinking water instead of juice/soda  - advise beginning light exercise program (i.e. Walking 30min 4-5x/wk)  - advise keeping food diary to help identify problem foods/times/stressors  - pt advised that weight loss of ~ 1lb/wk is a good goal and not to become frustrated if loss is slower

## 2024-08-30 ENCOUNTER — TELEPHONE (OUTPATIENT)
Dept: FAMILY MEDICINE CLINIC | Facility: CLINIC | Age: 59
End: 2024-08-30

## 2024-08-30 ENCOUNTER — APPOINTMENT (OUTPATIENT)
Dept: MRI IMAGING | Facility: HOSPITAL | Age: 59
End: 2024-08-30
Payer: COMMERCIAL

## 2024-08-30 VITALS
RESPIRATION RATE: 18 BRPM | BODY MASS INDEX: 46.93 KG/M2 | HEIGHT: 62 IN | DIASTOLIC BLOOD PRESSURE: 72 MMHG | WEIGHT: 255 LBS | SYSTOLIC BLOOD PRESSURE: 140 MMHG | TEMPERATURE: 98.1 F | HEART RATE: 58 BPM | OXYGEN SATURATION: 99 %

## 2024-08-30 PROBLEM — R29.90 STROKE-LIKE SYMPTOMS: Status: ACTIVE | Noted: 2024-08-28

## 2024-08-30 PROCEDURE — 70551 MRI BRAIN STEM W/O DYE: CPT

## 2024-08-30 PROCEDURE — NC001 PR NO CHARGE: Performed by: PHYSICIAN ASSISTANT

## 2024-08-30 PROCEDURE — 99238 HOSP IP/OBS DSCHRG MGMT 30/<: CPT | Performed by: PHYSICIAN ASSISTANT

## 2024-08-30 RX ORDER — ASPIRIN 81 MG/1
81 TABLET, CHEWABLE ORAL DAILY
Qty: 30 TABLET | Refills: 0 | Status: SHIPPED | OUTPATIENT
Start: 2024-08-31 | End: 2024-09-30

## 2024-08-30 RX ORDER — ATORVASTATIN CALCIUM 40 MG/1
40 TABLET, FILM COATED ORAL EVERY EVENING
Qty: 30 TABLET | Refills: 0 | Status: SHIPPED | OUTPATIENT
Start: 2024-08-31

## 2024-08-30 RX ADMIN — FLECAINIDE ACETATE 50 MG: 50 TABLET ORAL at 13:17

## 2024-08-30 RX ADMIN — ASPIRIN 81 MG CHEWABLE TABLET 81 MG: 81 TABLET CHEWABLE at 08:49

## 2024-08-30 RX ADMIN — BUSPIRONE HYDROCHLORIDE 7.5 MG: 5 TABLET ORAL at 17:48

## 2024-08-30 RX ADMIN — RIVAROXABAN 20 MG: 20 TABLET, FILM COATED ORAL at 08:49

## 2024-08-30 RX ADMIN — BUPROPION HYDROCHLORIDE 150 MG: 150 TABLET, EXTENDED RELEASE ORAL at 08:49

## 2024-08-30 RX ADMIN — ATORVASTATIN CALCIUM 40 MG: 40 TABLET, FILM COATED ORAL at 17:48

## 2024-08-30 RX ADMIN — GABAPENTIN 800 MG: 400 CAPSULE ORAL at 17:48

## 2024-08-30 RX ADMIN — METOPROLOL SUCCINATE 25 MG: 25 TABLET, EXTENDED RELEASE ORAL at 08:49

## 2024-08-30 RX ADMIN — GABAPENTIN 800 MG: 400 CAPSULE ORAL at 08:49

## 2024-08-30 RX ADMIN — REMDESIVIR 100 MG: 100 INJECTION, POWDER, LYOPHILIZED, FOR SOLUTION INTRAVENOUS at 13:17

## 2024-08-30 RX ADMIN — DIAZEPAM 5 MG: 10 INJECTION, SOLUTION INTRAMUSCULAR; INTRAVENOUS at 17:49

## 2024-08-30 RX ADMIN — SERTRALINE HYDROCHLORIDE 150 MG: 100 TABLET ORAL at 08:49

## 2024-08-30 RX ADMIN — BUSPIRONE HYDROCHLORIDE 7.5 MG: 5 TABLET ORAL at 08:49

## 2024-08-30 NOTE — ASSESSMENT & PLAN NOTE
"Presented with left arm tingling, double vision, and slurred speech/word finding activity that began at approximately 7:30 on morning of admission  CT head: no acute intracranial abnormality  CTA head neck: \"No large vessel occlusion, high-grade stenosis, or intracranial aneurysm identified on CT angiogram of the head. No hemodynamically significant stenosis or dissection identified on CT angiogram of the neck.\"  Allow permissive HTN Keep BP < 220/110  Started ASA 81mg, statin (Lipitor 40mg qd)  MRI brain pending  Echocardiogram: EF 61%, diastolic function normal  At baseline in regards to PT/OT  Neurology following  Await additional imaging for further neurology recommendations  "

## 2024-08-30 NOTE — TELEPHONE ENCOUNTER
Fax received from [employer] requesting prior authorization for Zepbound 15mg. Patient instructions are nject 0.5 mL (15 mg total) under the skin once a week     Key [id number]  Please initiate prior authorization.

## 2024-08-30 NOTE — UTILIZATION REVIEW
Initial Clinical Review    Admission: Date/Time/Statement:   Admission Orders (From admission, onward)       Ordered        08/28/24 1141  Place in Observation  Once                          Orders Placed This Encounter   Procedures    Place in Observation     Standing Status:   Standing     Number of Occurrences:   1     Order Specific Question:   Level of Care     Answer:   Med Surg [16]     ED Arrival Information       Expected   -    Arrival   8/28/2024 10:35    Acuity   Emergent              Means of arrival   Wheelchair    Escorted by   Family Member    Service   Hospitalist    Admission type   Emergency              Arrival complaint   Stroke like Symptoms             Chief Complaint   Patient presents with    CVA/TIA-like Symptoms     Pt c/o tingling in left arm, double vision, and garbling words when talking at work. Per visitor, pt seems like she is word searching and speaking slower. Pt reports double vision and tingling has resolved. Onset of symptoms around 0730.       Initial Presentation: 58 y.o. female presents to the ED from work with c/o L arm tingling, double vision, dsyarthria, symptoms resolved.  PMH: dysthymic disorder, HTN, HLD, A Fib on AC.  In the ED pt was found to be covid +, was HTN.  Rated pain 5/10.   Labs - elevated D dimer, CPR. Imaging - no acute stroke, enlarged main pulm artery c/w Pulm HTN.  n exam no deficits.  Admitted to Observation Status  with TIA, covid, HTN - isolation, neuro consult, MRI Brain, covid pathway, IV Remdesivir, poss Echo w/ bubble, tele.   Pt could not do MRI w/o sedation not offered at this campus.  Will substitute CT  head on 8/29.    Anticipated Length of Stay/Certification Statement: Patient will be admitted on an inpatient basis with an anticipated length of stay of greater than 2 midnights secondary to TIA.     8/28 Neuro Consult - TIA, covid HTN - no TNK, stroke pathway, MRI Brain, Echo, start ASA, xarelto, statin. NIHSS 0.  Will see if MRI Brain can be  done with IV Valium - MRI pending.      Date: 8/29   OBS:   TIA, Covid - no further neuro changes, is on IV Remdesivir day 2/3.  Repeat CT head negative for disease.  Pt has no deficits on exam today. Waiting MRI Brain, eager to go home today.     ED Triage Vitals   Temperature Pulse Respirations Blood Pressure SpO2 Pain Score   08/28/24 1147 08/28/24 1042 08/28/24 1041 08/28/24 1042 08/28/24 1042 08/28/24 1045   97.7 °F (36.5 °C) 55 18 (!) 171/77 99 % 5     Weight (last 2 days)       Date/Time Weight    08/29/24 0903 116 (255)    08/28/24 1215 116 (255.51)    08/28/24 1042 116 (255.51)            Vital Signs (last 3 days)       Date/Time Temp Pulse Resp BP MAP (mmHg) SpO2 O2 Device Patient Position - Orthostatic VS Luna Coma Scale Score Pain    08/30/24 1100 -- 58 18 140/72 -- 99 % None (Room air) Sitting -- --    08/30/24 0800 -- -- -- -- -- -- -- -- 15 No Pain    08/30/24 0645 98.1 °F (36.7 °C) 67 18 134/76 -- 99 % None (Room air) Lying -- --    08/30/24 0328 -- -- -- -- -- -- -- -- 14 --    08/30/24 0000 -- -- -- -- -- -- -- -- 14 --    08/29/24 2217 98 °F (36.7 °C) 58 18 130/68 -- 97 % None (Room air) Lying -- --    08/29/24 1954 -- -- -- -- -- -- -- -- -- 3    08/29/24 1900 97.9 °F (36.6 °C) 95 17 136/76 -- 100 % None (Room air) Sitting -- --    08/29/24 1630 98.4 °F (36.9 °C) 58 18 160/72 -- 98 % None (Room air) Sitting -- --    08/29/24 1438 -- -- -- -- -- -- -- -- -- 8    08/29/24 1201 98.3 °F (36.8 °C) 60 16 140/82 -- 98 % None (Room air) Sitting -- --    08/29/24 0903 -- 54 18 145/71 94 -- -- Sitting -- --    08/29/24 0900 98 °F (36.7 °C) 56 16 -- -- 98 % None (Room air) -- -- --    08/29/24 0840 -- -- -- -- -- -- -- -- -- No Pain    08/29/24 0800 -- -- -- -- -- -- -- -- 15 No Pain    08/29/24 0318 -- -- -- -- -- -- -- -- 14 --    08/29/24 0316 98.3 °F (36.8 °C) 60 18 101/66 76 97 % None (Room air) Lying -- --    08/29/24 0000 97.9 °F (36.6 °C) 54 18 110/67 -- 98 % -- Lying 14 --    08/28/24 2205 -- --  -- -- -- -- -- -- -- 3    08/28/24 2200 97.7 °F (36.5 °C) 55 18 132/80 -- 98 % -- Lying 15 --    08/28/24 2000 -- -- -- -- -- -- None (Room air) -- 15 --    08/28/24 1958 97.8 °F (36.6 °C) 57 18 112/72 93 99 % None (Room air) Lying -- --    08/28/24 1957 -- -- -- -- -- -- -- -- -- No Pain    08/28/24 1800 -- 56 18 111/78 -- 99 % None (Room air) Lying 15 --    08/28/24 1600 -- 56 18 120/74 -- -- -- Lying 15 --    08/28/24 1500 97 °F (36.1 °C) 51 18 119/65 -- 97 % None (Room air) Lying 15 --    08/28/24 1400 -- 55 18 175/90 -- 98 % None (Room air) Lying 15 --    08/28/24 1300 97.5 °F (36.4 °C) 50 18 118/78 -- 100 % None (Room air) Lying 15 No Pain    08/28/24 1215 -- 52 18 151/72 103 96 % None (Room air) Sitting -- No Pain    08/28/24 1212 -- -- -- -- -- -- -- -- 15 --    08/28/24 1200 -- 53 17 160/75 108 97 % None (Room air) Lying -- --    08/28/24 1147 97.7 °F (36.5 °C) -- -- -- -- -- -- -- -- --    08/28/24 1142 -- -- -- -- -- -- None (Room air) -- -- --    08/28/24 1126 -- 56 28 -- -- 98 % -- -- -- --    08/28/24 1120 -- 55 25 -- -- 99 % -- -- -- --    08/28/24 1115 -- 56 18 133/57 -- 98 % None (Room air) Lying 15 No Pain    08/28/24 1110 -- 53 23 -- -- 96 % -- -- -- --    08/28/24 1108 -- -- -- 142/67 -- -- -- -- -- --    08/28/24 1105 -- 59 29 -- -- 97 % -- -- -- --    08/28/24 1100 -- 63 18 124/78 -- 96 % None (Room air) Lying 15 3    08/28/24 1055 -- 65 25 -- -- 94 % -- -- -- --    08/28/24 1050 -- 54 28 -- -- 96 % -- -- -- --    08/28/24 1047 -- -- -- 159/59 -- -- -- -- -- --    08/28/24 1045 -- 61 18 159/59 -- 96 % -- -- 15 5    08/28/24 1044 -- -- -- -- -- -- -- -- 15 --    08/28/24 1042 -- 55 -- 171/77 111 99 % None (Room air) -- -- --    08/28/24 1041 -- -- 18 -- -- -- -- Lying -- --              Pertinent Labs/Diagnostic Test Results:   Radiology:  CT head wo contrast   Final Interpretation by Hoang Del Toro MD (08/29 1130)      No mass effect, acute intracranial hemorrhage or evidence of recent infarction.                   Workstation performed: [id number]         CTA stroke alert (head/neck)   Final Interpretation by Ryan Rowland MD (08/28 1111)      No large vessel occlusion, high-grade stenosis, or intracranial aneurysm identified on CT angiogram of the head.      No hemodynamically significant stenosis or dissection identified on CT angiogram of the neck.      Enlarged main pulmonary artery indicative of pulmonary hypertension.         I personally discussed this study with Dr. Haley on 8/28/2024 at 11:09 AM.            Workstation performed: [id number]         CT stroke alert brain   Final Interpretation by Ryan Rowland MD (08/28 1112)      No acute intracranial abnormality.      Findings were directly discussed with Dr. Bertha Gabriel  at approximately 11:04 a.m on 8/28/2024..      Workstation performed: [id number]         MRI brain wo contrast    (Results Pending)        Cardiology:  Echo complete w/ contrast if indicated   Final Result by Dax Billingsley MD (08/29 1230)        Left Ventricle: Left ventricular cavity size is normal. Wall thickness    is normal. The left ventricular ejection fraction is 61% by single    dimension measurement. Systolic function is normal. Wall motion is normal.    Diastolic function is normal.  Left atrial filling pressure is normal.     Right Ventricle: Right ventricular cavity size is mildly dilated.     Left Atrium: The atrium is moderately dilated (42-48 mL/m2).     Right Atrium: The atrium is mildly dilated.     Tricuspid Valve: There is mild regurgitation. The right ventricular    systolic pressure is mildly elevated. The estimated right ventricular    systolic pressure is 38.00 mmHg.           GI:  No orders to display       Results from last 7 days   Lab Units 08/28/24  1050   SARS-COV-2  Positive*     Results from last 7 days   Lab Units 08/29/24  0619 08/28/24  1050   WBC Thousand/uL 5.59 6.95   HEMOGLOBIN g/dL 11.3* 12.2   HEMATOCRIT % 33.7* 36.8   PLATELETS  Thousands/uL 375 435*   TOTAL NEUT ABS Thousands/µL 3.36  --          Results from last 7 days   Lab Units 08/29/24  0619 08/28/24  1050   SODIUM mmol/L 140 135   POTASSIUM mmol/L 4.3 4.1   CHLORIDE mmol/L 107 102   CO2 mmol/L 28 29   ANION GAP mmol/L 5 4   BUN mg/dL 18 12   CREATININE mg/dL 0.73 0.89   EGFR ml/min/1.73sq m 89 70   CALCIUM mg/dL 8.9 9.3   MAGNESIUM mg/dL 2.0  --    PHOSPHORUS mg/dL 3.3  --      Results from last 7 days   Lab Units 08/29/24  0619   AST U/L 12*   ALT U/L 9   ALK PHOS U/L 59   TOTAL PROTEIN g/dL 6.2*   ALBUMIN g/dL 3.6   TOTAL BILIRUBIN mg/dL 0.25     Results from last 7 days   Lab Units 08/28/24  1042   POC GLUCOSE mg/dl 85     Results from last 7 days   Lab Units 08/29/24  0619 08/28/24  1050   GLUCOSE RANDOM mg/dL 86 84         Results from last 7 days   Lab Units 08/29/24  0619   HEMOGLOBIN A1C % 5.3   EAG mg/dl 105       Results from last 7 days   Lab Units 08/28/24  1050 08/28/24  0000   HS TNI 0HR ng/L 3  --    HS TNI 2HR ng/L  --  3   HSTNI D2 ng/L  --  0     Results from last 7 days   Lab Units 08/28/24  1050   D-DIMER QUANTITATIVE ug/ml FEU 0.50*     Results from last 7 days   Lab Units 08/28/24  1050   PROTIME seconds 14.7   INR  1.13   PTT seconds 42*     Results from last 7 days   Lab Units 08/28/24  1050   TSH 3RD GENERATON uIU/mL 2.318     Results from last 7 days   Lab Units 08/28/24  1050   PROCALCITONIN ng/ml <0.05                 Results from last 7 days   Lab Units 08/28/24  1050   BNP pg/mL 71       Results from last 7 days   Lab Units 08/28/24  1050   CRP mg/L 9.8*           Results from last 7 days   Lab Units 08/28/24  1050   INFLUENZA A PCR  Negative   INFLUENZA B PCR  Negative   RSV PCR  Negative     ED Treatment-Medication Administration from 08/28/2024 1035 to 08/28/2024 1250         Date/Time Order Dose Route Action     08/28/2024 1103 iohexol (OMNIPAQUE) 350 MG/ML injection (SINGLE-DOSE) 85 mL 85 mL Intravenous Given     08/28/2024 1147 aspirin chewable  tablet 81 mg 81 mg Oral Given            Past Medical History:   Diagnosis Date    A-fib (HCC)     Allergic     Anemia     Anxiety     Arthritis     Knees and back     Depression     Fall     Headache(784.0)     Hiatal hernia     Hyperlipidemia     Hypertension     Morbid obesity (HCC)     Obesity     Pneumonia      Present on Admission:   TIA (transient ischemic attack)   Dysthymic disorder   Essential hypertension   Mixed hyperlipidemia   PAF (paroxysmal atrial fibrillation) (HCC)   Morbid (severe) obesity due to excess calories (HCC)   COVID      Admitting Diagnosis: Stroke-like symptoms [R29.90]  Stroke-like symptom [R29.90]  Age/Sex: 58 y.o. female  Admission Orders:  Scheduled Medications:  aspirin, 81 mg, Oral, Daily  atorvastatin, 40 mg, Oral, QPM  buPROPion, 150 mg, Oral, Daily  busPIRone, 7.5 mg, Oral, BID  diazepam, 10 mg, Intravenous, Once  docusate sodium, 100 mg, Oral, BID  flecainide, 50 mg, Oral, BID  gabapentin, 800 mg, Oral, TID  metoprolol succinate, 25 mg, Oral, Daily  rivaroxaban, 20 mg, Oral, Daily With Breakfast  sertraline, 150 mg, Oral, Daily  zolpidem, 10 mg, Oral, HS      Continuous IV Infusions:     PRN Meds:  acetaminophen, 650 mg, Oral, Q4H PRN - x 1 8/28, x 2 8/29  ALPRAZolam, 0.5 mg, Oral, Daily PRN  diazepam, 5 mg, Intravenous, Once PRN  hydrALAZINE, 10 mg, Intravenous, Q6H PRN  ondansetron, 4 mg, Intravenous, Q6H PRN    Stroke pathway  Neuro checks Every 1 hour x 4 hours, then every 2 hours x 4, then every 4 hours x 72 hours                 MRI Brain  IP CONSULT TO NEUROLOGY  IP CONSULT TO CASE MANAGEMENT    Network Utilization Review Department  ATTENTION: Please call with any questions or concerns to [phone] and carefully listen to the prompts so that you are directed to the right person. All voicemails are confidential.   For Discharge needs, contact Care Management DC Support Team at [phone] opt. [phone]  Send all requests for admission clinical reviews, approved or denied  determinations and any other requests to dedicated fax number below belonging to the campus where the patient is receiving treatment. List of dedicated fax numbers for the Facilities:  FACILITY NAME UR FAX NUMBER   ADMISSION DENIALS (Administrative/Medical Necessity) [phone]   DISCHARGE SUPPORT TEAM (NETWORK) [phone]   PARENT CHILD HEALTH (Maternity/NICU/Pediatrics) [phone]   [facility] [phone]   [facility] [phone]   [facility] [phone]   [facility] [phone]   [facility] [phone]   [facility] [phone]   [facility] [phone]   [facility] [phone]   [facility] [phone]   [facility] [phone]   [facility] [phone]   [facility] [phone]

## 2024-08-30 NOTE — PROGRESS NOTES
"[facility]  Progress Note  Name: Lisa Tamayo I  MRN: [id number]  Unit/Bed#: E4 -01 I Date of Admission: 8/28/2024   Date of Service: 8/30/2024 I Hospital Day: 0    Assessment & Plan   * TIA (transient ischemic attack)  Assessment & Plan  Presented with left arm tingling, double vision, and slurred speech/word finding activity that began at approximately 7:30 on morning of admission  CT head: no acute intracranial abnormality  CTA head neck: \"No large vessel occlusion, high-grade stenosis, or intracranial aneurysm identified on CT angiogram of the head. No hemodynamically significant stenosis or dissection identified on CT angiogram of the neck.\"  Allow permissive HTN Keep BP < 220/110  Started ASA 81mg, statin (Lipitor 40mg qd)  MRI brain pending  Echocardiogram: EF 61%, diastolic function normal  At baseline in regards to PT/OT  Neurology following  Await additional imaging for further neurology recommendations    COVID  Assessment & Plan  Patient presents from work with strokelike symptoms  In the ED, found to have test positive for COVID   Placed in contact and airborne isolation  Placed on mild pathway  Start 3-day course of remdesivir- completed # day 3/3  Already on anticoagulation  Ambulation and OOB t.i.d.  Saturating well on room air and asymptomatic from a respiratory standpoint    Morbid (severe) obesity due to excess calories (HCC)  Assessment & Plan  - pt with BMI of Body mass index is 46.64 kg/m².  - pt counseled on risks associated with obesity and it's contribution to other health issues  - advise portion control, healthy food choices, drinking water instead of juice/soda  - advise beginning light exercise program (i.e. Walking 30min 4-5x/wk)  - advise keeping food diary to help identify problem foods/times/stressors  - pt advised that weight loss of ~ 1lb/wk is a good goal and not to become frustrated if loss is slower    PAF (paroxysmal atrial fibrillation) " ([facility])  Assessment & Plan  Heart rate slightly bradycardic, but stable at 55 bpm on admission  Currently rate controlled with Toprol and flecainide  Anticoagulated with Xarelto    Mixed hyperlipidemia  Assessment & Plan  Initiated Lipitor 40 mg daily in the setting of stroke pathway  Lipid panel obtained here and cholesterol 273, triglycerides 194, HDL 38     Essential hypertension  Assessment & Plan  Home regimen metoprolol succinate 25 mg daily, lisinopril 10 mg daily  Blood pressure medications was held in the setting of stroke pathway  Continue to monitor pressures and resume as able.    Dysthymic disorder  Assessment & Plan  Maintained on BuSpar, Ambien, Zoloft, Xanax as needed        VTE Pharmacologic Prophylaxis:   Pharmacologic: Rivaroxaban (Xarelto)  Mechanical VTE Prophylaxis in Place: Yes    AM-PAC Basic Mobility:  Basic Mobility Inpatient Raw Score: 24  JH-HLM Achieved: 8: Walk 250 feet ot more  JH-HLM Goal: 8: Walk 250 feet or more    Discharge Plan: With needed inpatient stay for pending MRI brain    Discussions with Specialists or Other Care Team Provider: Nursing, neurology, case management    Education and Discussions with Family / Patient: Patient    Time Spent for Care: This time was spent on one or more of the following: performing physical exam; counseling and coordination of care; obtaining or reviewing history; documenting in the medical record; reviewing/ordering tests, medications, or procedures; communicating with other healthcare professionals and discussing with patient's family/caregivers.    Current Length of Stay: 0 day(s)  Current Patient Status: Observation   Code Status: Level 1 - Full Code    Subjective:   Patient resting in bed.  She reports her symptoms have resolved.  She is anxiously awaiting MRI of brain.  This is scheduled for later tonight.  Expresses eagerness of going home afterwards.    Objective:     Vitals:   Temp (24hrs), Av.1 °F (36.7 °C), Min:97.9 °F (36.6  °C), Max:98.4 °F (36.9 °C)    Temp:  [97.9 °F (36.6 °C)-98.4 °F (36.9 °C)] 98.1 °F (36.7 °C)  HR:  [58-95] 58  Resp:  [17-18] 18  BP: (130-160)/(68-76) 140/72  SpO2:  [97 %-100 %] 99 %  Body mass index is 46.64 kg/m².     Input and Output Summary (last 24 hours):       Intake/Output Summary (Last 24 hours) at 8/30/2024 1538  Last data filed at 8/30/2024 1000  Gross per 24 hour   Intake 240 ml   Output --   Net 240 ml       Physical Exam:     Physical Exam  Vitals and nursing note reviewed.   Constitutional:       General: She is not in acute distress.     Appearance: She is obese. She is not toxic-appearing or diaphoretic.   HENT:      Head: Normocephalic and atraumatic.   Eyes:      General: No scleral icterus.  Cardiovascular:      Rate and Rhythm: Normal rate and regular rhythm.   Pulmonary:      Effort: Pulmonary effort is normal. No respiratory distress.      Breath sounds: Normal breath sounds. No wheezing.   Abdominal:      General: Bowel sounds are normal. There is no distension.      Palpations: Abdomen is soft. There is no mass.      Tenderness: There is no abdominal tenderness.      Hernia: No hernia is present.   Musculoskeletal:         General: No swelling.      Cervical back: Neck supple.   Skin:     General: Skin is warm and dry.   Neurological:      Mental Status: She is alert and oriented to person, place, and time. Mental status is at baseline.   Psychiatric:         Mood and Affect: Mood normal.         Behavior: Behavior normal.         Additional Data:     Labs:    Results from last 7 days   Lab Units 08/29/24  0619   WBC Thousand/uL 5.59   HEMOGLOBIN g/dL 11.3*   HEMATOCRIT % 33.7*   PLATELETS Thousands/uL 375   SEGS PCT % 58   LYMPHO PCT % 26   MONO PCT % 12   EOS PCT % 2     Results from last 7 days   Lab Units 08/29/24  0619   POTASSIUM mmol/L 4.3   CHLORIDE mmol/L 107   CO2 mmol/L 28   BUN mg/dL 18   CREATININE mg/dL 0.73   CALCIUM mg/dL 8.9   ALK PHOS U/L 59   ALT U/L 9   AST U/L 12*      Results from last 7 days   Lab Units 08/28/24  1050   INR  1.13       * I Have Reviewed All Lab Data Listed Above.  * Additional Pertinent Lab Tests Reviewed: All Labs For Current Hospital Admission Reviewed    Imaging:    Imaging Reports Reviewed Today Include:   Imaging Personally Reviewed by Myself Includes:      Recent Cultures (last 7 days):           Lines/Drains:  Invasive Devices       Peripheral Intravenous Line  Duration             Peripheral IV 08/28/24 Left Antecubital 2 days                    Last 24 Hours Medication List:   Current Facility-Administered Medications   Medication Dose Route Frequency Provider Last Rate    acetaminophen  650 mg Oral Q4H PRN Luis Adler MD      ALPRAZolam  0.5 mg Oral Daily PRN Luis Adler MD      aspirin  81 mg Oral Daily Luis Adler MD      atorvastatin  40 mg Oral QPM Luis Adler MD      buPROPion  150 mg Oral Daily Luis Adler MD      busPIRone  7.5 mg Oral BID Luis Adler MD      diazepam  10 mg Intravenous Once Aria Tarca, CRNP      diazepam  5 mg Intravenous Once PRN Aria Tarca, CRNP      docusate sodium  100 mg Oral BID Luis Adler MD      flecainide  50 mg Oral BID Luis Adler MD      gabapentin  800 mg Oral TID Luis Adler MD      hydrALAZINE  10 mg Intravenous Q6H PRN Luis Adler MD      metoprolol succinate  25 mg Oral Daily Luis Adler MD      ondansetron  4 mg Intravenous Q6H PRN Luis Adler MD      rivaroxaban  20 mg Oral Daily With Breakfast Luis Adler MD      sertraline  150 mg Oral Daily Luis Adler MD      zolpidem  10 mg Oral HS Luis Adler MD          Today, Patient Was Seen By: Syeda Pepper PA-C    ** Please Note: This note has been constructed using a voice recognition system. **

## 2024-08-30 NOTE — DISCHARGE SUMMARY
"[facility]  Discharge- Lisa Tamayo 1965, 58 y.o. female MRN: [id number]  Unit/Bed#: E4 -01 Encounter: [id number]  Primary Care Provider: Ann Duarte DO   Date and time admitted to hospital: 8/28/2024 10:40 AM    * TIA (transient ischemic attack)  Assessment & Plan  Presented with left arm tingling, double vision, and slurred speech/word finding activity that began at approximately 7:30 on morning of admission  CT head:no acute intracranial abnormality  CTA head neck: \"No large vessel occlusion, high-grade stenosis, or intracranial aneurysm identified on CT angiogram of the head. No hemodynamically significant stenosis or dissection identified on CT angiogram of the neck.\"  Allow permissive HTN Keep BP < 220/110  Started ASA 81mg, statin (Lipitor 40mg qd)  MRI brain pending  Echocardiogram: EF 61%, diastolic function normal  At baseline in regards to in regards to PT/OT  Continue to monitor on telemetry  Neurology following  Await additional imaging for further neurology recommendations        Consultations During Hospital Stay:  ***    Procedures Performed:   CT head wo contrast  Result Date: 8/29/2024  Impression: No mass effect, acute intracranial hemorrhage or evidence of recent infarction. Workstation performed: [id number]     CT stroke alert brain  Result Date: 8/28/2024  Impression: No acute intracranial abnormality. Findings were directly discussed with Dr. Bertha Gabriel  at approximately 11:04 a.m on 8/28/2024.. Workstation performed: [id number]     CTA stroke alert (head/neck)  Result Date: 8/28/2024  Impression: No large vessel occlusion, high-grade stenosis, or intracranial aneurysm identified on CT angiogram of the head. No hemodynamically significant stenosis or dissection identified on CT angiogram of the neck. Enlarged main pulmonary artery indicative of pulmonary hypertension. I personally discussed this study with Dr. Haley on 8/28/2024 at " "11:09 AM. Workstation performed: [id number]      Significant Findings / Test Results:   ***    Incidental Findings:   ***     Test Results Pending at Discharge (will require follow up):   ***     Outpatient Tests Requested:  ***    Complications:  none    Hospital Course:     Lisa Tamayo is a 58 y.o. female patient who originally presented to the hospital on 8/28/2024 due to ***    Condition at Discharge: stable     Discharge Day Visit / Exam:     Subjective:  ***    Vitals: Blood Pressure: 140/72 (08/30/24 1100)  Pulse: 58 (08/30/24 1100)  Temperature: 98.1 °F (36.7 °C) (08/30/24 0645)  Temp Source: Temporal (08/30/24 0645)  Respirations: 18 (08/30/24 1100)  Height: 5' 2\" (157.5 cm) (08/29/24 0903)  Weight - Scale: 116 kg (255 lb) (08/29/24 0903)  SpO2: 99 % (08/30/24 1100)    Exam:   Physical Exam  Vitals and nursing note reviewed.   Constitutional:       General: She is not in acute distress.     Appearance: She is normal weight. She is not ill-appearing, toxic-appearing or diaphoretic.   HENT:      Head: Normocephalic and atraumatic.   Eyes:      General: No scleral icterus.  Cardiovascular:      Rate and Rhythm: Normal rate and regular rhythm.   Pulmonary:      Effort: Pulmonary effort is normal. No respiratory distress.      Breath sounds: Normal breath sounds. No stridor. No wheezing or rhonchi.   Abdominal:      General: Bowel sounds are normal. There is no distension.      Palpations: Abdomen is soft. There is no mass.      Tenderness: There is no abdominal tenderness.      Hernia: No hernia is present.   Musculoskeletal:         General: No swelling.      Cervical back: Neck supple.   Skin:     General: Skin is warm and dry.   Neurological:      Mental Status: She is alert and oriented to person, place, and time. Mental status is at baseline.   Psychiatric:         Mood and Affect: Mood normal.         Behavior: Behavior normal.       Discharge instructions/Information to patient and family:   See " after visit summary for information provided to patient and family.      Provisions for Follow-Up Care:  See after visit summary for information related to follow-up care and any pertinent home health orders.      Planned Readmission: no     Discharge Statement:  This time was spent on the day of discharge. I had direct contact with the patient on the day of discharge. Greater than 50% of the total time was spent examining patient, answering all patient questions, arranging and discussing plan of care with patient as well as directly providing post-discharge instructions.  Additional time then spent on discharge activities.    Discharge Medications:  See after visit summary for reconciled discharge medications provided to patient and family.      ** Please Note: This note has been constructed using a voice recognition system **

## 2024-08-30 NOTE — ASSESSMENT & PLAN NOTE
Patient presents from work with strokelike symptoms  In the ED, found to have test positive for COVID   Placed in contact and airborne isolation  Placed on mild pathway  Start 3-day course of remdesivir- completed # day 3/3  Already on anticoagulation  Ambulation and OOB t.i.d.  Saturating well on room air and asymptomatic from a respiratory standpoint

## 2024-08-31 NOTE — ASSESSMENT & PLAN NOTE
Heart rate slightly bradycardic, but stable/asymptomatic at 55 bpm on admission  Currently rate controlled with Toprol and flecainide  Anticoagulated with Xarelto

## 2024-08-31 NOTE — DISCHARGE SUMMARY
"[facility]  Discharge- Lisa Tamayo 1965, 58 y.o. female MRN: [id number]  Unit/Bed#: E4 -01 Encounter: [id number]  Primary Care Provider: Ann Duarte DO   Date and time admitted to hospital: 8/28/2024 10:40 AM    * Stroke-like symptoms  Assessment & Plan  Presented with left arm tingling, double vision, and slurred speech/word finding activity that began at approximately 7:30 on morning of admission  Resolved upon admission with no new neurological complaints during hospital course  CT head: no acute intracranial abnormality  CTA head neck: \"No large vessel occlusion, high-grade stenosis, or intracranial aneurysm identified on CT angiogram of the head. No hemodynamically significant stenosis or dissection identified on CT angiogram of the neck.\"  Started ASA 81mg, statin (Lipitor 40mg qd)  MRI brain: No recent infarct, acute intracranial hemorrhage or mass effect.   -Probable minimal microangiopathic changes.   Echocardiogram: EF 61%, diastolic function normal  At baseline in regards to PT/OT    COVID  Assessment & Plan  Patient presents from work with strokelike symptoms  In the ED, found to have test positive for COVID   Placed in contact and airborne isolation  Placed on mild pathway  Start 3-day course of remdesivir- completed # day 3/3  Already on anticoagulation  Ambulation and OOB t.i.d.  Saturating well on room air and asymptomatic from a respiratory standpoint    PAF (paroxysmal atrial fibrillation) ([facility])  Assessment & Plan  Heart rate slightly bradycardic, but stable/asymptomatic at 55 bpm on admission  Currently rate controlled with Toprol and flecainide  Anticoagulated with Xarelto    Mixed hyperlipidemia  Assessment & Plan  Initiated Lipitor 40 mg daily in the setting of stroke pathway  Lipid panel obtained here and cholesterol 273, triglycerides 194, HDL 38     Essential hypertension  Assessment & Plan  Home regimen metoprolol succinate 25 mg daily, " lisinopril 10 mg daily, held on admission for permissive hypertension in setting of possible stroke  Metoprolol resumed, BP stable, resume lisinopril on dc    Morbid (severe) obesity due to excess calories (HCC)  Assessment & Plan  pt with BMI of Body mass index is 46.64 kg/m².  counseled on risks associated with obesity and it's contribution to other health issues  advise portion control, healthy food choices, drinking water instead of juice/soda  advise beginning light exercise program (i.e. Walking 30min 4-5x/wk)  advise keeping food diary to help identify problem foods/times/stressors  pt advised that weight loss of ~ 1lb/wk is a good goal and not to become frustrated if loss is slower        Medical Problems       Resolved Problems  Date Reviewed: 8/30/2024   None       Discharging Physician / Practitioner: Cher Seaman PA-C  PCP: Ann Duarte DO  Admission Date:   Admission Orders (From admission, onward)       Ordered        08/28/24 1141  Place in Observation  Once                          Discharge Date: 08/30/24    Consultations During Hospital Stay:  Neurology    Procedures Performed:   None    Significant Findings / Test Results:   MRI brain wo contrast: No recent infarct, acute intracranial hemorrhage or mass effect. Probable minimal microangiopathic changes.   CT head without contrast: No mass effect, acute intracranial hemorrhage or evidence of recent infarction.   CTA head and neck stroke alert: No large vessel occlusion, high-grade stenosis, or intracranial aneurysm identified on CT angiogram of the head. No hemodynamically significant stenosis or dissection identified on CT angiogram of the neck. Enlarged main pulmonary artery indicative of pulmonary hypertension.     Incidental Findings:   n/a    Test Results Pending at Discharge (will require follow up):   none     Outpatient Tests Requested:  none    Complications:  none    Reason for Admission: stroke like symptoms    Hospital Course:    Lisa Tamayo is a 58 y.o. female patient who originally presented to the hospital on 8/28/2024 due to symptoms of L arm tingling, double vision, and slurred speech with word finding difficulty that began the morning of admission. A stroke alert was called in the ED and CTA h/n and CTH were negative for intracranial/vascular abnormalities in the head and neck to explain her symptoms. Neurology was consulted who recommended initiation of ASA and statin and to obtain MRI brain. She also tested positive for COVID in the ED and completed a 3 day course of remdesivir. She did not require supplemental oxygen. Her MRI brain was negative, with no acute infarct found. Her symptoms had resolved at the time of admission with no recurrence or new neurologic deficits/complaints during the course of her hospitalization. She initially opted to get an OP MRI after discharge however completed this inpatient, and was discharged after her MRI per her rounding provider's discussion with her.         Please see above list of diagnoses and related plan for additional information.     Condition at Discharge: good    Discharge Day Visit / Exam:   * Please refer to separate progress note for these details *    Discharge instructions/Information to patient and family:   See after visit summary for information provided to patient and family.      Provisions for Follow-Up Care:  See after visit summary for information related to follow-up care and any pertinent home health orders.      Mobility at time of Discharge:   Basic Mobility Inpatient Raw Score: 24  JH-HLM Goal: 8: Walk 250 feet or more  JH-HLM Achieved: 8: Walk 250 feet ot more  HLM Goal achieved. Continue to encourage appropriate mobility.     Disposition:   Home    Planned Readmission: no     Discharge Medications:  See after visit summary for reconciled discharge medications provided to patient and/or family.      **Please Note: This note may have been constructed using a  voice recognition system**

## 2024-08-31 NOTE — ASSESSMENT & PLAN NOTE
"Presented with left arm tingling, double vision, and slurred speech/word finding activity that began at approximately 7:30 on morning of admission  Resolved upon admission with no new neurological complaints during hospital course  CT head: no acute intracranial abnormality  CTA head neck: \"No large vessel occlusion, high-grade stenosis, or intracranial aneurysm identified on CT angiogram of the head. No hemodynamically significant stenosis or dissection identified on CT angiogram of the neck.\"  Started ASA 81mg, statin (Lipitor 40mg qd)  MRI brain: No recent infarct, acute intracranial hemorrhage or mass effect.   -Probable minimal microangiopathic changes.   Echocardiogram: EF 61%, diastolic function normal  At baseline in regards to PT/OT  "

## 2024-08-31 NOTE — ASSESSMENT & PLAN NOTE
Home regimen metoprolol succinate 25 mg daily, lisinopril 10 mg daily, held on admission for permissive hypertension in setting of possible stroke  Metoprolol resumed, BP stable, resume lisinopril on dc

## 2024-08-31 NOTE — ASSESSMENT & PLAN NOTE
pt with BMI of Body mass index is 46.64 kg/m².  counseled on risks associated with obesity and it's contribution to other health issues  advise portion control, healthy food choices, drinking water instead of juice/soda  advise beginning light exercise program (i.e. Walking 30min 4-5x/wk)  advise keeping food diary to help identify problem foods/times/stressors  pt advised that weight loss of ~ 1lb/wk is a good goal and not to become frustrated if loss is slower

## 2024-09-03 ENCOUNTER — RA CDI HCC (OUTPATIENT)
Dept: OTHER | Facility: HOSPITAL | Age: 59
End: 2024-09-03

## 2024-09-03 ENCOUNTER — TRANSITIONAL CARE MANAGEMENT (OUTPATIENT)
Dept: FAMILY MEDICINE CLINIC | Facility: CLINIC | Age: 59
End: 2024-09-03

## 2024-09-05 ENCOUNTER — TELEPHONE (OUTPATIENT)
Age: 59
End: 2024-09-05

## 2024-09-05 NOTE — TELEPHONE ENCOUNTER
1ST ATTEMPT,     Called pt no answer, LMOM.    Thank you,     Jocelin PERRIN/ KURT ALL/ TIA    DC- HOME- 8/30/2024    ----- Message from Darline Haley MD sent at 8/30/2024  1:37 PM EDT -----  Regarding: HFU  Lisa Tamayo will need follow-up in in 6 weeks with neurovascular team for TIA in 60 minute appointment. They will require a MRI brain under anesthesia within several weeks.     Please help the patient scheduling imaging PRIOR to having in-office visit.

## 2024-09-05 NOTE — TELEPHONE ENCOUNTER
PA for ZEPBOUND 15 MG/0.5 ML SUBMITTED     via    [x]CMM-KEY: [id number]   []Surescripts-Case ID #   []Faxed to plan   []Other website   []Phone call Riaz ID #     Office notes sent, clinical questions answered. Awaiting determination    Turnaround time for your insurance to make a decision on your Prior Authorization can take 7-21 business days.

## 2024-09-11 ENCOUNTER — OFFICE VISIT (OUTPATIENT)
Dept: FAMILY MEDICINE CLINIC | Facility: CLINIC | Age: 59
End: 2024-09-11
Payer: COMMERCIAL

## 2024-09-11 VITALS
DIASTOLIC BLOOD PRESSURE: 72 MMHG | OXYGEN SATURATION: 97 % | HEART RATE: 60 BPM | TEMPERATURE: 97.4 F | RESPIRATION RATE: 16 BRPM | BODY MASS INDEX: 42.68 KG/M2 | WEIGHT: 250 LBS | SYSTOLIC BLOOD PRESSURE: 120 MMHG | HEIGHT: 64 IN

## 2024-09-11 DIAGNOSIS — R73.01 IFG (IMPAIRED FASTING GLUCOSE): ICD-10-CM

## 2024-09-11 DIAGNOSIS — I10 ESSENTIAL HYPERTENSION: ICD-10-CM

## 2024-09-11 DIAGNOSIS — E66.01 MORBID (SEVERE) OBESITY DUE TO EXCESS CALORIES (HCC): ICD-10-CM

## 2024-09-11 DIAGNOSIS — E78.2 MIXED HYPERLIPIDEMIA: ICD-10-CM

## 2024-09-11 DIAGNOSIS — R06.83 SNORING: Primary | ICD-10-CM

## 2024-09-11 DIAGNOSIS — G45.9 TIA (TRANSIENT ISCHEMIC ATTACK): ICD-10-CM

## 2024-09-11 DIAGNOSIS — I48.0 PAF (PAROXYSMAL ATRIAL FIBRILLATION) (HCC): ICD-10-CM

## 2024-09-11 DIAGNOSIS — D68.9 COAGULOPATHY (HCC): ICD-10-CM

## 2024-09-11 DIAGNOSIS — Z12.31 OTHER SCREENING MAMMOGRAM: ICD-10-CM

## 2024-09-11 DIAGNOSIS — U07.1 COVID: ICD-10-CM

## 2024-09-11 DIAGNOSIS — D64.9 ANEMIA, UNSPECIFIED TYPE: ICD-10-CM

## 2024-09-11 PROCEDURE — 99496 TRANSJ CARE MGMT HIGH F2F 7D: CPT | Performed by: FAMILY MEDICINE

## 2024-09-11 RX ORDER — LISINOPRIL 10 MG/1
10 TABLET ORAL DAILY
Qty: 90 TABLET | Refills: 0 | Status: SHIPPED | OUTPATIENT
Start: 2024-09-11

## 2024-09-11 NOTE — PATIENT INSTRUCTIONS
All labs in 3 months  F/u 3 mo  Call neurology to make appt--the clinical question is whether to continue aspirin  [phone]  I'll touch base with cardiology re: a longer term monitor  Obtain mammogram  We'll try to appeal the [facility]  Home sleep study

## 2024-09-11 NOTE — ASSESSMENT & PLAN NOTE
Pt should continue zepbound as it is helping her to lose weight and (now that she has had TIA) lowering her overall CV risk   Will try and appeal

## 2024-09-11 NOTE — PROGRESS NOTES
Ambulatory Visit  Name: Lisa Tamayo      : 1965      MRN: [id number]  Encounter Provider: Ann Duarte DO  Encounter Date: 2024   Encounter department: [facility]    Assessment & Plan  TIA (transient ischemic attack)  Unclear to me why this happened on a/c, though covid +, so possible hypercoagulability     Tele in hospital okay.  Known afib.  Will d/w cards potential need for longer-term monitoring    Continue ASA, xarelto until seen by neuro for opinion    Continue statin        Snoring   Given snoring hx, hx afib and possible pulmonary HTN changes seen on CT, check home study    Orders:    Ambulatory Referral to Sleep Medicine; Future    Morbid (severe) obesity due to excess calories (HCC)   Pt should continue zepbound as it is helping her to lose weight and (now that she has had TIA) lowering her overall CV risk   Will try and appeal        Essential hypertension   Blood pressure well controlled today on current meds   Continue same       IFG (impaired fasting glucose)   A1c now normal on zepbound  Continue same       Anemia, unspecified type   Likely due to blood draws and fluids inpt   Check with next labs   Orders:    CBC and differential; Future    Coagulopathy (HCC)   Continue xarelto   Discuss asa with neuro       Other screening mammogram    Orders:    Mammo screening bilateral w 3d and cad; Future    COVID   resolved       Mixed hyperlipidemia   Recheck labs 3 mo   Lipitor started as inpt   Continue same  Orders:    Lipid panel; Future    Comprehensive metabolic panel; Future    PAF (paroxysmal atrial fibrillation) (HCC)   Rhythm control on board   Continue a/c   Continue f/u with cardiology          History of Present Illness     HPI      Pt presents in f/u from recent hospitalization for TIA.     Pt was brought to ED .  7:30 AM on day of admission she was experiencing double vision, and headache.  While at work she experienced left arm tingling, and  onset of speech disturbance, with some slurring starting at 9 which lasted until 10 AM.  Family took her to ED at 10:30 and on arrival, sx had nearly completely resolved. Stroke alert was called.  She was found to be covid + (asymptomatic).  CT head nml.  CTA showed:    No large vessel occlusion, high-grade stenosis, or intracranial aneurysm identified on CT angiogram of the head.     No hemodynamically significant stenosis or dissection identified on CT angiogram of the neck.     Enlarged main pulmonary artery indicative of pulmonary hypertension.    Next day CT nml, and MRI showed only minimal microangiopathic changes.     Echo showed nml EF with nml systolic/diastolic fxn.  RV mildly dilated.  LA moderately dilated.  RA mildly dilated.  RV systolic pressure mildly elevated.    She was started on lipitor (cholesterol had risen significantly), treated with remdesivir for covid, and started on asa 81mg with her xarelto.  She was then discharged.      No sx since she returned home.  She is concerned about being on both asa and xarelto.  Tolerating lipitor    Pt has not yet had f/u with neuro, though they have called to schedule.  She does note that she feels like she doesn't remember conversations as well as she used to.  Son will tell her they've had a convo before and she doesn't recall.  Hasn't gotten lost or made mistakes at work.  Due for labs from bariatrics.      Pt is struggling with weight.  Hx of bariatric surgery.  Has failed ozempic.  Now on zepbound and doing well.  Needs to get BMI under 40 for knee surgery which she needs to be able to move comfortably.  Unfortunately, insurance has just denied 15m dose of zepbound when they have been covering it prior.  Will need appeal          Review of Systems   Constitutional:  Negative for chills, fatigue, fever and unexpected weight change.   HENT:  Negative for congestion, ear pain, hearing loss, postnasal drip, rhinorrhea, sinus pressure, sinus pain, sore  "throat, trouble swallowing and voice change.    Eyes:  Negative for pain, redness and visual disturbance.   Respiratory:  Negative for cough and shortness of breath.    Cardiovascular:  Negative for chest pain, palpitations and leg swelling.   Gastrointestinal:  Negative for abdominal pain, constipation, diarrhea and nausea.   Endocrine: Negative for cold intolerance, heat intolerance, polydipsia and polyuria.   Genitourinary:  Negative for dysuria, frequency and urgency.   Musculoskeletal:  Negative for arthralgias, joint swelling and myalgias.   Skin:  Negative for rash.        No suspicious lesions   Neurological:  Negative for weakness, numbness and headaches.   Hematological:  Negative for adenopathy.           Objective     /72 (BP Location: Left arm, Patient Position: Sitting, Cuff Size: Large)   Pulse 60   Temp (!) 97.4 °F (36.3 °C) (Temporal)   Resp 16   Ht 5' 4\" (1.626 m)   Wt 113 kg (250 lb)   SpO2 97%   BMI 42.91 kg/m²     Physical Exam  Constitutional:       Appearance: Normal appearance.   HENT:      Head: Normocephalic and atraumatic.      Right Ear: Tympanic membrane, ear canal and external ear normal.      Left Ear: Tympanic membrane, ear canal and external ear normal.      Nose: Nose normal. No congestion.      Mouth/Throat:      Mouth: Mucous membranes are moist.      Pharynx: No oropharyngeal exudate or posterior oropharyngeal erythema.   Eyes:      Extraocular Movements: Extraocular movements intact.      Conjunctiva/sclera: Conjunctivae normal.      Pupils: Pupils are equal, round, and reactive to light.   Neck:      Vascular: No carotid bruit.   Cardiovascular:      Rate and Rhythm: Normal rate and regular rhythm.      Heart sounds: No murmur heard.     No friction rub. No gallop.   Pulmonary:      Effort: Pulmonary effort is normal.      Breath sounds: No wheezing, rhonchi or rales.   Abdominal:      General: Abdomen is flat. There is no distension.      Palpations: Abdomen is " soft.      Tenderness: There is no abdominal tenderness.   Musculoskeletal:      Cervical back: Neck supple.   Lymphadenopathy:      Cervical: No cervical adenopathy.   Neurological:      General: No focal deficit present.      Mental Status: She is alert and oriented to person, place, and time.      Cranial Nerves: No cranial nerve deficit.      Motor: No weakness.      Deep Tendon Reflexes: Reflexes normal.

## 2024-09-11 NOTE — ASSESSMENT & PLAN NOTE
Recheck labs 3 mo   Lipitor started as inpt   Continue same  Orders:    Lipid panel; Future    Comprehensive metabolic panel; Future

## 2024-09-11 NOTE — ASSESSMENT & PLAN NOTE
Given snoring hx, hx afib and possible pulmonary HTN changes seen on CT, check home study    Orders:    Ambulatory Referral to Sleep Medicine; Future

## 2024-09-15 ENCOUNTER — TRANSCRIBE ORDERS (OUTPATIENT)
Dept: SLEEP CENTER | Facility: CLINIC | Age: 59
End: 2024-09-15

## 2024-09-15 DIAGNOSIS — R06.83 SNORING: Primary | ICD-10-CM

## 2024-09-19 DIAGNOSIS — F41.1 GAD (GENERALIZED ANXIETY DISORDER): ICD-10-CM

## 2024-09-19 DIAGNOSIS — F51.01 PRIMARY INSOMNIA: ICD-10-CM

## 2024-09-22 DIAGNOSIS — I48.0 PAF (PAROXYSMAL ATRIAL FIBRILLATION) (HCC): ICD-10-CM

## 2024-09-23 RX ORDER — FLECAINIDE ACETATE 50 MG/1
TABLET ORAL
Qty: 180 TABLET | Refills: 0 | Status: SHIPPED | OUTPATIENT
Start: 2024-09-23

## 2024-09-23 RX ORDER — RIVAROXABAN 20 MG/1
TABLET, FILM COATED ORAL
Qty: 90 TABLET | Refills: 1 | Status: SHIPPED | OUTPATIENT
Start: 2024-09-23

## 2024-09-24 RX ORDER — ZOLPIDEM TARTRATE 10 MG/1
10 TABLET ORAL
Qty: 30 TABLET | Refills: 0 | Status: SHIPPED | OUTPATIENT
Start: 2024-09-24

## 2024-09-24 RX ORDER — ALPRAZOLAM 0.5 MG
0.5 TABLET ORAL DAILY PRN
Qty: 30 TABLET | Refills: 0 | Status: SHIPPED | OUTPATIENT
Start: 2024-09-24

## 2024-09-30 DIAGNOSIS — R29.90 STROKE-LIKE SYMPTOMS: ICD-10-CM

## 2024-09-30 RX ORDER — ATORVASTATIN CALCIUM 40 MG/1
40 TABLET, FILM COATED ORAL EVERY EVENING
Qty: 90 TABLET | Refills: 0 | Status: CANCELLED | OUTPATIENT
Start: 2024-09-30

## 2024-09-30 RX ORDER — ASPIRIN 81 MG/1
81 TABLET, CHEWABLE ORAL DAILY
Qty: 90 TABLET | Refills: 0 | Status: CANCELLED | OUTPATIENT
Start: 2024-09-30 | End: 2024-10-30

## 2024-09-30 NOTE — TELEPHONE ENCOUNTER
Message sent via [employer].   Good morning:     Could Dr. Duarte please fill my prescriptions for the baby aspirin and the atorvastatin that I was prescribed, while in the hospital. Could they also please be 90 day supply.       Thank you very much.      Lisa

## 2024-10-04 DIAGNOSIS — E66.01 MORBID (SEVERE) OBESITY DUE TO EXCESS CALORIES (HCC): ICD-10-CM

## 2024-10-04 DIAGNOSIS — R29.90 STROKE-LIKE SYMPTOMS: ICD-10-CM

## 2024-10-04 RX ORDER — ATORVASTATIN CALCIUM 40 MG/1
40 TABLET, FILM COATED ORAL EVERY EVENING
Qty: 90 TABLET | Refills: 1 | Status: SHIPPED | OUTPATIENT
Start: 2024-10-04

## 2024-10-04 RX ORDER — TIRZEPATIDE 15 MG/.5ML
15 INJECTION, SOLUTION SUBCUTANEOUS WEEKLY
Qty: 6 ML | Refills: 0 | Status: SHIPPED | OUTPATIENT
Start: 2024-10-04

## 2024-10-04 RX ORDER — ASPIRIN 81 MG/1
81 TABLET, CHEWABLE ORAL DAILY
Qty: 30 TABLET | Refills: 1 | Status: SHIPPED | OUTPATIENT
Start: 2024-10-04 | End: 2025-04-02

## 2024-10-08 ENCOUNTER — OFFICE VISIT (OUTPATIENT)
Dept: NEUROLOGY | Facility: CLINIC | Age: 59
End: 2024-10-08
Payer: COMMERCIAL

## 2024-10-08 VITALS
TEMPERATURE: 96.3 F | BODY MASS INDEX: 41.83 KG/M2 | WEIGHT: 245 LBS | HEIGHT: 64 IN | DIASTOLIC BLOOD PRESSURE: 80 MMHG | SYSTOLIC BLOOD PRESSURE: 110 MMHG

## 2024-10-08 DIAGNOSIS — D68.9 COAGULOPATHY (HCC): Primary | ICD-10-CM

## 2024-10-08 DIAGNOSIS — R51.9 CHRONIC HEADACHE: ICD-10-CM

## 2024-10-08 DIAGNOSIS — G89.29 CHRONIC HEADACHE: ICD-10-CM

## 2024-10-08 DIAGNOSIS — R41.3 AMNESIA/MEMORY DISORDER: ICD-10-CM

## 2024-10-08 DIAGNOSIS — I48.0 PAF (PAROXYSMAL ATRIAL FIBRILLATION) (HCC): ICD-10-CM

## 2024-10-08 DIAGNOSIS — G45.9 TIA (TRANSIENT ISCHEMIC ATTACK): ICD-10-CM

## 2024-10-08 PROCEDURE — 99215 OFFICE O/P EST HI 40 MIN: CPT | Performed by: PSYCHIATRY & NEUROLOGY

## 2024-10-08 PROCEDURE — 96372 THER/PROPH/DIAG INJ SC/IM: CPT | Performed by: PSYCHIATRY & NEUROLOGY

## 2024-10-08 RX ORDER — PROCHLORPERAZINE EDISYLATE 5 MG/ML
10 INJECTION INTRAMUSCULAR; INTRAVENOUS ONCE
Status: COMPLETED | OUTPATIENT
Start: 2024-10-08 | End: 2024-10-08

## 2024-10-08 RX ORDER — TOPIRAMATE 25 MG/1
25 TABLET, FILM COATED ORAL 2 TIMES DAILY
Qty: 60 TABLET | Refills: 2 | Status: SHIPPED | OUTPATIENT
Start: 2024-10-08

## 2024-10-08 RX ADMIN — PROCHLORPERAZINE EDISYLATE 10 MG: 5 INJECTION INTRAMUSCULAR; INTRAVENOUS at 08:52

## 2024-10-08 NOTE — ASSESSMENT & PLAN NOTE
"Patient is complaining of having headaches almost several times a week.  -Current  treatment plan -   Preventative medications: Start patient on Topamax 25 mg twice daily, reviewed side effects with the patient.  Failed medications in the past -gabapentin.    Abortive medications:  cannot do triptans due to history of recent TIA, would consider Nurtec as needed.  She states Tylenol does break her headaches.  Botox/CGRPs medications: Not at this time    Acute treatment in the office: Will give her Compazine in the office.    Lifestyle Modifications:  1.Maintain headache diary.  We discussed an SREEDHAR for a smart phone is \"Migraine buddy\"  2. When patient has a moderate to severe headache, they should seek rest, initiate relaxation and apply cold compresses to the head.   3. Hydration - Please drink at least 64 ounces of water a day to help remain hydrated.  4. Sleep -  Maintain regular sleep schedule. Adults need at least 7-8 hours of uninterrupted a night. Maintain good sleep hygiene:Adults need at least 7-8 hours of uninterrupted a night.   5. Diet - Patient is to have regular frequent meals to prevent headache onset. Avoid dietary trigger. (aged cheese, peanuts, MSG, aspartame and nitrates).  6. Medications - Limit over the counter medications such as Tylenol, Ibuprofen, Aleve, Excedrin. (No more than 3 times a week).  7. Exercise - Daily exercise is not only good for your heart but also good for your mental health. Recommend 4-5 times a week for 20 minutes.      Orders:    topiramate (Topamax) 25 mg tablet; Take 1 tablet (25 mg total) by mouth 2 (two) times a day    "

## 2024-10-08 NOTE — PROGRESS NOTES
"Ambulatory Visit  Name: Lisa Tamayo      : 1965      MRN: [id number]  Encounter Provider: Danna Dejesus MD  Encounter Date: 10/8/2024   Encounter department: [facility] [place]    58-year-old female who is here as a hospital follow-up for TIA.  CT head reviewed which was negative.  CTA head and neck also reviewed negative for any atherosclerotic disease.  MRI brain did not show any acute findings.  She does have a history of A-fib.  Aspirin was also added to the regimen.  Assessment & Plan  Coagulopathy (HCC)         PAF (paroxysmal atrial fibrillation) (HCC)         TIA (transient ischemic attack)  -for secondary stroke prevention, recommend continuation of combination of aspirin, xarelto and atorvastatin  -Blood Pressure goal < 130/80, BP is at goal.   -LDL goal <70  -snoring -not at this time.    Counseling/stroke education -   -I advised patient to avoid using NSAIDs for headaches or other pain and to stick to tylenol if needed  -Recommend lifestyle modifications such as mediterranean diet & regular exercise regimen atleast 4-5 times a week for 20-30 minutes.   -I educated patient/family regarding medication compliance  -encourage control of diabetes and hypertension; defer management to primary        Chronic headache  Patient is complaining of having headaches almost several times a week.  -Current  treatment plan -   Preventative medications: Start patient on Topamax 25 mg twice daily, reviewed side effects with the patient.  Failed medications in the past -gabapentin.    Abortive medications:  cannot do triptans due to history of recent TIA, would consider Nurtec as needed.  She states Tylenol does break her headaches.  Botox/CGRPs medications: Not at this time    Acute treatment in the office: Will give her Compazine in the office.    Lifestyle Modifications:  1.Maintain headache diary.  We discussed an SREEDHAR for a smart phone is \"Migraine buddy\"  2. When patient has " a moderate to severe headache, they should seek rest, initiate relaxation and apply cold compresses to the head.   3. Hydration - Please drink at least 64 ounces of water a day to help remain hydrated.  4. Sleep -  Maintain regular sleep schedule. Adults need at least 7-8 hours of uninterrupted a night. Maintain good sleep hygiene:Adults need at least 7-8 hours of uninterrupted a night.   5. Diet - Patient is to have regular frequent meals to prevent headache onset. Avoid dietary trigger. (aged cheese, peanuts, MSG, aspartame and nitrates).  6. Medications - Limit over the counter medications such as Tylenol, Ibuprofen, Aleve, Excedrin. (No more than 3 times a week).  7. Exercise - Daily exercise is not only good for your heart but also good for your mental health. Recommend 4-5 times a week for 20 minutes.      Orders:    topiramate (Topamax) 25 mg tablet; Take 1 tablet (25 mg total) by mouth 2 (two) times a day    Amnesia/memory disorder  Will monitor at this time.  Recommend doing MoCA at the next visit.    Follow up in 4 to 5 months.    I would be happy to see the patient sooner if any new questions/concerns arise.  Patient/Guardian was advised to the call the office if they have any questions and concerns in the meantime.     Patient/Guardian does understand that if they have any new stroke like symptoms such as facial droop on one side, weakness/paralysis on either side, speech trouble, numbness on one side, balance issues, any vision changes, extreme dizziness or any new headache, to call 9-1-1 immediately or to proceed to the nearest ER immediately.             History of Present Illness   HPI    58-year-old right-handed female presented as a hospital follow-up.    She was seen in the hospital August 28, 2024 with double vision, slurred speech and left arm tingling.  By the time she arrived to the ER 2 hours later her symptoms completely resolved.  Her blood pressure in the ED was 131/77.  She does have a  "history of paroxysmal A-fib has been maintained on Xarelto.   MRI brain was done which was negative for any acute findings.  She had a CT head and CTA head and neck which did not show any acute findings.  Etiology was thought to be TIA patient was started on aspirin in addition to Xarelto.    She denies any TIA or systemic symptoms she is on medications on her medications well without any issues.  She is maintained on aspirin Xarelto and atorvastatin for now.    She says she has been having more headaches recently about 3-4 headaches a week and is having to take Tylenol more frequently Tylenol does seem to break her headaches.  She states they are not as bad as migraines but does have them and does have to lay down sometimes she states.  She is currently maintained on gabapentin 800 mg 3 times a day for her knee replacement which does not seem to be touching it.  She also does have nausea at times.  She is also complaining of memory issues and seems more forgetful.      Review of Systems   Constitutional: Negative.    HENT: Negative.     Eyes:  Positive for photophobia (phonophobia).   Respiratory: Negative.     Cardiovascular: Negative.    Gastrointestinal: Negative.    Endocrine: Negative.    Genitourinary: Negative.    Musculoskeletal: Negative.    Skin: Negative.    Allergic/Immunologic: Negative.    Neurological:  Positive for headaches (4-5/7 days per week, preassure, 7-10/10).   Hematological: Negative.    Psychiatric/Behavioral: Negative.     All other systems reviewed and are negative.    I have personally reviewed the MA's review of systems and made changes as necessary.      Objective     /80   Temp (!) 96.3 °F (35.7 °C) (Temporal)   Ht 5' 4\" (1.626 m)   Wt 111 kg (245 lb)   BMI 42.05 kg/m²     Physical Exam  General - patient is alert   Speech - no dysarthria noted, no aphasia noted.     Neuro:   Cranial nerves: PERRL, EOMI, facial sensation intact to soft touch in V1, V2 and V3, no facial " asymmetry noted, uvula/palate midline, tongue midline.   Motor: 5/5 throughout, normal tone, no pronator drift noted.   Sensory - intact to soft touch throughout  Reflexes - 2+ throughout  Coordination - no ataxia/dysmetria noted  Gait - normal      Results/Data:  I have reviewed the results and images from the in detail with the patient.        Administrative Statements   I have spent a total time of 40 minutes in caring for this patient on the day of the visit/encounter including Risks and benefits of tx options, Instructions for management, Counseling / Coordination of care, Documenting in the medical record, Reviewing / ordering tests, medicine, procedures  , Obtaining or reviewing history  , and Communicating with other healthcare professionals .

## 2024-10-12 DIAGNOSIS — F41.1 GAD (GENERALIZED ANXIETY DISORDER): ICD-10-CM

## 2024-10-12 RX ORDER — SERTRALINE HYDROCHLORIDE 100 MG/1
150 TABLET, FILM COATED ORAL DAILY
Qty: 45 TABLET | Refills: 5 | Status: SHIPPED | OUTPATIENT
Start: 2024-10-12

## 2024-10-18 ENCOUNTER — HOSPITAL ENCOUNTER (OUTPATIENT)
Dept: SLEEP CENTER | Facility: CLINIC | Age: 59
Discharge: HOME | End: 2024-10-18
Payer: COMMERCIAL

## 2024-10-18 DIAGNOSIS — R06.83 SNORING: ICD-10-CM

## 2024-10-18 PROCEDURE — G0399 HOME SLEEP TEST/TYPE 3 PORTA: HCPCS

## 2024-10-18 NOTE — PROGRESS NOTES
Home Sleep Study Documentation    HOME STUDY DEVICE: Noxturnal no                                           Dariela G3 yes device # 13      Pre-Sleep Home Study:    Set-up and instructions performed by: Mica    Technician performed demonstration for Patient: yes    Return demonstration performed by Patient: yes    Written instructions provided to Patient: yes    Patient signed consent form: yes        Post-Sleep Home Study:    Additional comments by Patient: None    Home Sleep Study Failed:no:    Failure reason: N/A    Reported or Detected: N/A    Scored by: NORMA Fatima

## 2024-10-19 DIAGNOSIS — E66.01 MORBID OBESITY WITH BMI OF 45.0-49.9, ADULT (HCC): ICD-10-CM

## 2024-10-20 DIAGNOSIS — F41.1 GAD (GENERALIZED ANXIETY DISORDER): Primary | ICD-10-CM

## 2024-10-20 DIAGNOSIS — F51.01 PRIMARY INSOMNIA: ICD-10-CM

## 2024-10-20 DIAGNOSIS — I10 ESSENTIAL HYPERTENSION: ICD-10-CM

## 2024-10-21 RX ORDER — BUPROPION HYDROCHLORIDE 100 MG/1
100 TABLET, EXTENDED RELEASE ORAL 2 TIMES DAILY
Qty: 60 TABLET | Refills: 1 | Status: SHIPPED | OUTPATIENT
Start: 2024-10-21

## 2024-10-22 PROBLEM — G47.33 OSA (OBSTRUCTIVE SLEEP APNEA): Status: ACTIVE | Noted: 2024-10-22

## 2024-10-22 RX ORDER — SERTRALINE HYDROCHLORIDE 100 MG/1
150 TABLET, FILM COATED ORAL DAILY
Qty: 45 TABLET | Refills: 1 | Status: SHIPPED | OUTPATIENT
Start: 2024-10-22

## 2024-10-22 RX ORDER — LISINOPRIL 10 MG/1
10 TABLET ORAL DAILY
Qty: 90 TABLET | Refills: 1 | Status: SHIPPED | OUTPATIENT
Start: 2024-10-22

## 2024-10-23 RX ORDER — ZOLPIDEM TARTRATE 10 MG/1
10 TABLET ORAL
Qty: 30 TABLET | Refills: 0 | Status: SHIPPED | OUTPATIENT
Start: 2024-10-23

## 2024-10-23 RX ORDER — ALPRAZOLAM 0.5 MG
0.5 TABLET ORAL DAILY PRN
Qty: 30 TABLET | Refills: 0 | Status: SHIPPED | OUTPATIENT
Start: 2024-10-23

## 2024-10-28 PROBLEM — I48.0 PAROXYSMAL ATRIAL FIBRILLATION (HCC): Status: ACTIVE | Noted: 2024-10-28

## 2024-10-28 PROCEDURE — 95806 SLEEP STUDY UNATT&RESP EFFT: CPT

## 2024-11-12 ENCOUNTER — HOSPITAL ENCOUNTER (OUTPATIENT)
Dept: MAMMOGRAPHY | Facility: MEDICAL CENTER | Age: 59
Discharge: HOME | End: 2024-11-12
Payer: COMMERCIAL

## 2024-11-12 VITALS — WEIGHT: 245 LBS | BODY MASS INDEX: 41.83 KG/M2 | HEIGHT: 64 IN

## 2024-11-12 DIAGNOSIS — Z12.31 OTHER SCREENING MAMMOGRAM: ICD-10-CM

## 2024-11-12 PROCEDURE — 77067 SCR MAMMO BI INCL CAD: CPT

## 2024-11-12 PROCEDURE — 77063 BREAST TOMOSYNTHESIS BI: CPT

## 2024-11-13 ENCOUNTER — RESULTS FOLLOW-UP (OUTPATIENT)
Dept: FAMILY MEDICINE CLINIC | Facility: CLINIC | Age: 59
End: 2024-11-13

## 2024-11-18 DIAGNOSIS — F51.01 PRIMARY INSOMNIA: ICD-10-CM

## 2024-11-18 DIAGNOSIS — F41.1 GAD (GENERALIZED ANXIETY DISORDER): ICD-10-CM

## 2024-11-22 RX ORDER — ALPRAZOLAM 0.5 MG
0.5 TABLET ORAL DAILY PRN
Qty: 30 TABLET | Refills: 0 | Status: SHIPPED | OUTPATIENT
Start: 2024-11-22

## 2024-11-22 RX ORDER — ZOLPIDEM TARTRATE 10 MG/1
10 TABLET ORAL
Qty: 30 TABLET | Refills: 0 | Status: SHIPPED | OUTPATIENT
Start: 2024-11-22

## 2024-12-03 ENCOUNTER — OFFICE VISIT (OUTPATIENT)
Dept: CARDIOLOGY CLINIC | Facility: CLINIC | Age: 59
End: 2024-12-03
Payer: COMMERCIAL

## 2024-12-03 VITALS
SYSTOLIC BLOOD PRESSURE: 140 MMHG | HEIGHT: 64 IN | OXYGEN SATURATION: 100 % | DIASTOLIC BLOOD PRESSURE: 80 MMHG | WEIGHT: 246 LBS | HEART RATE: 60 BPM | BODY MASS INDEX: 42 KG/M2

## 2024-12-03 DIAGNOSIS — E78.2 MIXED HYPERLIPIDEMIA: ICD-10-CM

## 2024-12-03 DIAGNOSIS — I48.0 PAROXYSMAL ATRIAL FIBRILLATION (HCC): Primary | ICD-10-CM

## 2024-12-03 DIAGNOSIS — I10 ESSENTIAL HYPERTENSION: ICD-10-CM

## 2024-12-03 PROCEDURE — 99214 OFFICE O/P EST MOD 30 MIN: CPT | Performed by: INTERNAL MEDICINE

## 2024-12-03 NOTE — PROGRESS NOTES
Cardiology Follow Up    Lisa Tamayo  [id number]  1965  [facility] [place]  [address] [place]  [place] [address]      1. Paroxysmal atrial fibrillation (HCC)        2. Essential hypertension        3. Mixed hyperlipidemia  CANCELED: Lipid Panel with Direct LDL reflex          Discussion/Summary:    Preoperative cardiovascular evaluation: No contraindication to proceeding with her orthopedic surgery. No additional cardiac testing is necessary. She can hold her Xarelto 48 hours prior to her surgery. Given her history of PAF and now reported question TIA, recommend shortest course possible off of anticoagulation.  She may need to return to the office for an EKG, or can otherwise have this done with her preadmission testing with [facility]    PAF: Has been in sinus rhythm. Continue metoprolol, flecainide. On Xarelto. With the recent TIA, continue aspirin in addition to this.    LAE and mild pulmonary hypertension found on her most recent echo, not surprising given her other comorbidities. Continue current management.         Previous History:  59-year-old female.  History of PAF.    Previous history of hypertension. Currently controlled.    She has paroxysmal atrial fibrillation.  Discovered several years ago when she was at work.  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  She was having palpitations and heart rate was irregular.  She was escorted to the hospital where she was found to be in atrial fibrillation.  She ultimately cardioverted with medical therapy, did not require electrical cardioversion.  Since that time, she has been on flecainide, metoprolol, and Xarelto.  Nuc stress normal in 2016.    She has not felt any afib.  Episode of double vision, numbness, and slurred speech in Aug 2024. She was ultimately taken to hospital and found no CVA on imaging. [place] to be TIA. She was not in  afib. She was started on aspirin in addition to xarelto.    Interval History:    August, she had symptoms of double vision, slurred speech, numbness. She was at work and apparently was running into the wall.  She was taken to the hospital and felt that she had TIA. There was no CVA on imaging with MRI or CT. Aspirin was added to her Xarelto. She was not in A-fib she was in sinus rhythm the whole time.    Her symptoms has resolved. The patient is questioning whether this was truly TIA.    Blood work also showed that she had severe dyslipidemia. At the time, she was trying to get her weight down and was doing more of a keto diet with high sanchez, eggs, etc. She has been started on atorvastatin and does have orders for repeat blood work to be done.    At the end of January, she is going to get her knee replacement done for arthritis.      Patient Active Problem List   Diagnosis    Dysthymic disorder    Encounter for screening mammogram for breast cancer    Essential hypertension    Family history of heart disease    Knee pain, bilateral    Mixed hyperlipidemia    PAF (paroxysmal atrial fibrillation) (HCC)    Snoring    Coagulopathy (HCC)    Depression, recurrent (HCC)    Morbid (severe) obesity due to excess calories (HCC)    Stroke-like symptoms    COVID    Chronic headache    RAMOS (obstructive sleep apnea)    Paroxysmal atrial fibrillation (HCC)     Past Medical History:   Diagnosis Date    A-fib (HCC)     Allergic     Anemia     Anxiety     Arthritis     Knees and back     Depression     Fall     Headache(784.0)     Hiatal hernia     Hyperlipidemia     Hypertension     Morbid obesity (HCC)     Obesity     Pneumonia      Social History     Tobacco Use    Smoking status: Never    Smokeless tobacco: Never   Vaping Use    Vaping status: Never Used   Substance Use Topics    Alcohol use: Yes     Comment: On occasion … not on a regular basis    Drug use: Never      Family History   Problem Relation Age of Onset    Arthritis  Mother     COPD Mother     Diabetes Mother     Obesity Mother     Coronary artery disease Mother     Coronary artery disease Father     Diabetes Father     Heart attack Father     Heart disease Father     Obesity Father     Early death Father     Alzheimer's disease Paternal Grandfather     Dementia Paternal Grandfather      Past Surgical History:   Procedure Laterality Date    BACK SURGERY  1998    ESOPHAGOGASTRODUODENOSCOPY  10/17/2016    HERNIA REPAIR  10/2016    HIATAL HERNIA REPAIR  10/17/2016    CIRO Tello DO       LUMBAR DISC SURGERY  05/1998    Approx    PARTIAL GASTRECTOMY  10/17/2016    CIRO Tello DO       SLEEVE GASTROPLASTY  10/2016    SPINE SURGERY      25 years ago    VAGINAL DELIVERY      x1       Current Outpatient Medications:     ALPRAZolam (XANAX) 0.5 mg tablet, Take 1 tablet (0.5 mg total) by mouth daily as needed for anxiety, Disp: 30 tablet, Rfl: 0    aspirin 81 mg chewable tablet, Chew 1 tablet (81 mg total) daily, Disp: 30 tablet, Rfl: 1    atorvastatin (LIPITOR) 40 mg tablet, Take 1 tablet (40 mg total) by mouth every evening, Disp: 90 tablet, Rfl: 1    busPIRone (BUSPAR) 7.5 mg tablet, Take 1 tablet (7.5 mg total) by mouth 2 (two) times a day, Disp: 60 tablet, Rfl: 5    flecainide (TAMBOCOR) 50 mg tablet, TAKE ONE TABLET BY MOUTH TWICE A DAY, Disp: 180 tablet, Rfl: 0    gabapentin (NEURONTIN) 800 mg tablet, Take 1 tablet (800 mg total) by mouth 3 (three) times a day, Disp: 90 tablet, Rfl: 5    lisinopril (ZESTRIL) 10 mg tablet, Take 1 tablet (10 mg total) by mouth daily, Disp: 90 tablet, Rfl: 1    metoprolol succinate (TOPROL-XL) 25 mg 24 hr tablet, TAKE ONE TABLET BY MOUTH EVERY DAY, Disp: 90 tablet, Rfl: 3    rivaroxaban (Xarelto) 20 mg tablet, TAKE ONE TABLET BY MOUTH EVERY DAY, Disp: 90 tablet, Rfl: 1    sertraline (ZOLOFT) 100 mg tablet, Take 1.5 tablets (150 mg total) by mouth daily, Disp: 45 tablet, Rfl: 1    tirzepatide (Zepbound) 15 mg/0.5 mL auto-injector, Inject 0.5  "mL (15 mg total) under the skin once a week, Disp: 6 mL, Rfl: 0    topiramate (Topamax) 25 mg tablet, Take 1 tablet (25 mg total) by mouth 2 (two) times a day, Disp: 60 tablet, Rfl: 2    zolpidem (AMBIEN) 10 mg tablet, Take 1 tablet (10 mg total) by mouth daily at bedtime as needed for sleep, Disp: 30 tablet, Rfl: 0    buPROPion (Wellbutrin SR) 100 mg 12 hr tablet, Take 1 tablet (100 mg total) by mouth 2 (two) times a day, Disp: 60 tablet, Rfl: 0    buPROPion (WELLBUTRIN SR) 100 mg 12 hr tablet, TAKE ONE TABLET BY MOUTH TWICE A DAY, Disp: 60 tablet, Rfl: 1  Allergies   Allergen Reactions    Fentanyl Itching       Vitals:    12/03/24 1640   BP: 140/80   BP Location: Left arm   Patient Position: Sitting   Cuff Size: Large   Pulse: 60   SpO2: 100%   Weight: 112 kg (246 lb)   Height: 5' 4\" (1.626 m)     Vitals:    12/03/24 1640   Weight: 112 kg (246 lb)      Height: 5' 4\" (162.6 cm)   Body mass index is 42.23 kg/m².    Physical Exam:  GEN: Lisa Tamayo appears well, alert and oriented x 3, pleasant and cooperative   HEENT: pupils equal, round, and reactive to light; extraocular muscles intact  NECK: supple, no carotid bruits   HEART: regular rhythm, normal S1 and S2, no murmurs, clicks, gallops or rubs   LUNGS: clear to auscultation bilaterally; no wheezes, rales, or rhonchi   ABDOMEN: normal bowel sounds, soft, no tenderness, no distention  EXTREMITIES: peripheral pulses normal; no clubbing, cyanosis, or edema  NEURO: no focal findings   SKIN: normal without suspicious lesions on exposed skin      ROS:  Positive for knee pain with arthritis, palpitations.  Except as noted in HPI, is otherwise reviewed in detail and a 12 point review of systems is negative.  ROS reviewed and is unchanged    "

## 2024-12-04 DIAGNOSIS — R29.90 STROKE-LIKE SYMPTOMS: ICD-10-CM

## 2024-12-05 RX ORDER — ASPIRIN 81 MG
81 TABLET,CHEWABLE ORAL DAILY
Qty: 30 TABLET | Refills: 5 | Status: SHIPPED | OUTPATIENT
Start: 2024-12-05

## 2024-12-08 DIAGNOSIS — G89.29 CHRONIC HEADACHE: ICD-10-CM

## 2024-12-08 DIAGNOSIS — E66.01 MORBID (SEVERE) OBESITY DUE TO EXCESS CALORIES (HCC): ICD-10-CM

## 2024-12-08 DIAGNOSIS — M25.562 CHRONIC PAIN OF BOTH KNEES: ICD-10-CM

## 2024-12-08 DIAGNOSIS — G89.29 CHRONIC PAIN OF BOTH KNEES: ICD-10-CM

## 2024-12-08 DIAGNOSIS — M25.561 CHRONIC PAIN OF BOTH KNEES: ICD-10-CM

## 2024-12-08 DIAGNOSIS — R29.90 STROKE-LIKE SYMPTOMS: ICD-10-CM

## 2024-12-08 DIAGNOSIS — F41.1 GAD (GENERALIZED ANXIETY DISORDER): ICD-10-CM

## 2024-12-08 DIAGNOSIS — R51.9 CHRONIC HEADACHE: ICD-10-CM

## 2024-12-09 ENCOUNTER — VBI (OUTPATIENT)
Dept: ADMINISTRATIVE | Facility: OTHER | Age: 59
End: 2024-12-09

## 2024-12-09 RX ORDER — TOPIRAMATE 25 MG/1
25 TABLET, FILM COATED ORAL 2 TIMES DAILY
Qty: 60 TABLET | Refills: 5 | Status: SHIPPED | OUTPATIENT
Start: 2024-12-09

## 2024-12-09 NOTE — TELEPHONE ENCOUNTER
12/09/24 4:50 PM     Chart reviewed for Diabetic Eye Exam, Mammogram, and Pap Smear (HPV) aka Cervical Cancer Screening ; nothing is submitted to the patient's insurance at this time.     Miguel A Elliott MA   [facility]

## 2024-12-10 RX ORDER — BUSPIRONE HYDROCHLORIDE 7.5 MG/1
7.5 TABLET ORAL 2 TIMES DAILY
Qty: 60 TABLET | Refills: 5 | Status: SHIPPED | OUTPATIENT
Start: 2024-12-10

## 2024-12-10 RX ORDER — TIRZEPATIDE 15 MG/.5ML
15 INJECTION, SOLUTION SUBCUTANEOUS WEEKLY
Qty: 6 ML | Refills: 0 | Status: SHIPPED | OUTPATIENT
Start: 2024-12-10

## 2024-12-10 RX ORDER — ASPIRIN 81 MG/1
81 TABLET, CHEWABLE ORAL DAILY
Qty: 30 TABLET | Refills: 0 | OUTPATIENT
Start: 2024-12-10

## 2024-12-10 RX ORDER — GABAPENTIN 800 MG/1
800 TABLET ORAL 3 TIMES DAILY
Qty: 90 TABLET | Refills: 6 | Status: SHIPPED | OUTPATIENT
Start: 2024-12-10

## 2024-12-17 DIAGNOSIS — F41.1 GAD (GENERALIZED ANXIETY DISORDER): ICD-10-CM

## 2024-12-17 DIAGNOSIS — F51.01 PRIMARY INSOMNIA: ICD-10-CM

## 2024-12-17 RX ORDER — ZOLPIDEM TARTRATE 10 MG/1
10 TABLET ORAL
Qty: 30 TABLET | Refills: 0 | Status: SHIPPED | OUTPATIENT
Start: 2024-12-17

## 2024-12-17 RX ORDER — ALPRAZOLAM 0.5 MG
0.5 TABLET ORAL DAILY PRN
Qty: 30 TABLET | Refills: 0 | Status: SHIPPED | OUTPATIENT
Start: 2024-12-17

## 2024-12-18 DIAGNOSIS — E66.01 MORBID OBESITY WITH BMI OF 45.0-49.9, ADULT (HCC): ICD-10-CM

## 2024-12-18 RX ORDER — BUPROPION HYDROCHLORIDE 100 MG/1
100 TABLET, EXTENDED RELEASE ORAL 2 TIMES DAILY
Qty: 60 TABLET | Refills: 0 | Status: SHIPPED | OUTPATIENT
Start: 2024-12-18

## 2024-12-18 NOTE — TELEPHONE ENCOUNTER
Dion for pt to call back and schedule a f/up w sx provider for po wt gain fup appt in order to get additional refills

## 2024-12-24 DIAGNOSIS — I48.0 PAF (PAROXYSMAL ATRIAL FIBRILLATION) (HCC): ICD-10-CM

## 2024-12-26 ENCOUNTER — TELEPHONE (OUTPATIENT)
Dept: FAMILY MEDICINE CLINIC | Facility: CLINIC | Age: 59
End: 2024-12-26

## 2024-12-26 NOTE — TELEPHONE ENCOUNTER
Name of procedure: rt total knee replacement     Diagnosis: rt knee osteoarthritis     Date of procedure (within 30 days): 1/29/25     Surgeon: Dr Boogie     Location of procedure (hospital or out patient [facility] date/location/ type (Which labs? EKG? CXR?): labs,chest x-ray and EKG to be done in the office     Records (on epic or requested):     Type of anaesthesia:  general     Paperwork to complete for Pre-op (patient bringing vs. calling office to obtain prior to appointment): being faxed over     Pre-op appointment scheduled (date/time): 1/22/25 at 9:30am

## 2024-12-30 ENCOUNTER — TELEPHONE (OUTPATIENT)
Dept: FAMILY MEDICINE CLINIC | Facility: CLINIC | Age: 59
End: 2024-12-30

## 2024-12-30 RX ORDER — FLECAINIDE ACETATE 50 MG/1
50 TABLET ORAL 2 TIMES DAILY
Qty: 180 TABLET | Refills: 0 | Status: SHIPPED | OUTPATIENT
Start: 2024-12-30

## 2024-12-30 NOTE — TELEPHONE ENCOUNTER
Fax received from [employer] requesting prior authorization for Zepbound 15mg. Patient instructions are Inject 0.5 mL (15 mg total) under the skin once a week,     Key [id number]  Please initiate prior authorization

## 2024-12-30 NOTE — TELEPHONE ENCOUNTER
PA Zepbound 15 MG/0.5ML SUBMITTED     to [employer]      via    [x]CMM-KEY: [id number]  []Surescripts-Case ID #    []Availity-Auth ID #  NDC #    []Faxed to plan   []Other website    []Phone call Case ID #      []PA sent as URGENT    All office notes, labs and other pertaining documents and studies sent. Clinical questions answered. Awaiting determination from insurance company.     Turnaround time for your insurance to make a decision on your Prior Authorization can take 7-21 business days.

## 2024-12-31 DIAGNOSIS — E66.01 MORBID (SEVERE) OBESITY DUE TO EXCESS CALORIES (HCC): ICD-10-CM

## 2025-01-02 RX ORDER — TIRZEPATIDE 15 MG/.5ML
INJECTION, SOLUTION SUBCUTANEOUS
Qty: 6 ML | Refills: 0 | Status: SHIPPED | OUTPATIENT
Start: 2025-01-02

## 2025-01-04 DIAGNOSIS — E66.01 MORBID OBESITY WITH BMI OF 45.0-49.9, ADULT (HCC): ICD-10-CM

## 2025-01-04 DIAGNOSIS — G89.29 CHRONIC PAIN OF BOTH KNEES: ICD-10-CM

## 2025-01-04 DIAGNOSIS — R29.90 STROKE-LIKE SYMPTOMS: ICD-10-CM

## 2025-01-04 DIAGNOSIS — M25.562 CHRONIC PAIN OF BOTH KNEES: ICD-10-CM

## 2025-01-04 DIAGNOSIS — M25.561 CHRONIC PAIN OF BOTH KNEES: ICD-10-CM

## 2025-01-04 RX ORDER — ASPIRIN 81 MG/1
81 TABLET, CHEWABLE ORAL DAILY
Qty: 30 TABLET | Refills: 0 | Status: SHIPPED | OUTPATIENT
Start: 2025-01-04

## 2025-01-04 RX ORDER — ATORVASTATIN CALCIUM 40 MG/1
40 TABLET, FILM COATED ORAL EVERY EVENING
Qty: 90 TABLET | Refills: 0 | Status: SHIPPED | OUTPATIENT
Start: 2025-01-04 | End: 2025-01-12 | Stop reason: SDUPTHER

## 2025-01-04 RX ORDER — GABAPENTIN 800 MG/1
800 TABLET ORAL 3 TIMES DAILY
Qty: 90 TABLET | Refills: 0 | Status: SHIPPED | OUTPATIENT
Start: 2025-01-04

## 2025-01-06 RX ORDER — BUPROPION HYDROCHLORIDE 100 MG/1
100 TABLET, EXTENDED RELEASE ORAL 2 TIMES DAILY
Qty: 60 TABLET | Refills: 0 | Status: SHIPPED | OUTPATIENT
Start: 2025-01-06

## 2025-01-12 DIAGNOSIS — E66.01 MORBID OBESITY WITH BMI OF 45.0-49.9, ADULT (HCC): ICD-10-CM

## 2025-01-12 DIAGNOSIS — F51.01 PRIMARY INSOMNIA: ICD-10-CM

## 2025-01-12 DIAGNOSIS — R29.90 STROKE-LIKE SYMPTOMS: ICD-10-CM

## 2025-01-12 DIAGNOSIS — F41.1 GAD (GENERALIZED ANXIETY DISORDER): ICD-10-CM

## 2025-01-12 RX ORDER — ATORVASTATIN CALCIUM 40 MG/1
40 TABLET, FILM COATED ORAL EVERY EVENING
Qty: 90 TABLET | Refills: 1 | Status: SHIPPED | OUTPATIENT
Start: 2025-01-12

## 2025-01-13 RX ORDER — ZOLPIDEM TARTRATE 10 MG/1
10 TABLET ORAL
Qty: 30 TABLET | Refills: 0 | Status: SHIPPED | OUTPATIENT
Start: 2025-01-13 | End: 2025-01-22

## 2025-01-13 RX ORDER — ALPRAZOLAM 0.5 MG
0.5 TABLET ORAL DAILY PRN
Qty: 30 TABLET | Refills: 0 | Status: SHIPPED | OUTPATIENT
Start: 2025-01-13 | End: 2025-01-22

## 2025-01-14 RX ORDER — BUPROPION HYDROCHLORIDE 100 MG/1
100 TABLET, EXTENDED RELEASE ORAL 2 TIMES DAILY
Qty: 60 TABLET | Refills: 0 | OUTPATIENT
Start: 2025-01-14

## 2025-01-15 ENCOUNTER — RA CDI HCC (OUTPATIENT)
Dept: OTHER | Facility: HOSPITAL | Age: 60
End: 2025-01-15

## 2025-01-17 ENCOUNTER — RESULTS FOLLOW-UP (OUTPATIENT)
Dept: FAMILY MEDICINE CLINIC | Facility: CLINIC | Age: 60
End: 2025-01-17

## 2025-01-17 LAB
ALBUMIN SERPL-MCNC: 4 G/DL (ref 3.5–5.7)
ALP SERPL-CCNC: 54 U/L (ref 35–120)
ALT SERPL-CCNC: 7 U/L
ANION GAP SERPL CALCULATED.3IONS-SCNC: 8 MMOL/L (ref 3–11)
AST SERPL-CCNC: 11 U/L
BASOPHILS # BLD AUTO: 0 THOU/CMM (ref 0–0.1)
BASOPHILS NFR BLD AUTO: 1 %
BILIRUB SERPL-MCNC: 0.3 MG/DL (ref 0.2–1)
BUN SERPL-MCNC: 23 MG/DL (ref 7–25)
BUN SERPL-MCNC: 24 MG/DL (ref 7–25)
CALCIUM SERPL-MCNC: 9.5 MG/DL (ref 8.5–10.5)
CHLORIDE SERPL-SCNC: 109 MMOL/L (ref 100–109)
CHOLEST SERPL-MCNC: 207 MG/DL
CHOLEST/HDLC SERPL: 4.8 {RATIO}
CO2 SERPL-SCNC: 26 MMOL/L (ref 21–31)
CREAT SERPL-MCNC: 0.91 MG/DL (ref 0.4–1.1)
CYTOLOGY CMNT CVX/VAG CYTO-IMP: ABNORMAL
DIFFERENTIAL METHOD BLD: ABNORMAL
EOSINOPHIL # BLD AUTO: 0.1 THOU/CMM (ref 0–0.5)
EOSINOPHIL NFR BLD AUTO: 2 %
ERYTHROCYTE [DISTWIDTH] IN BLOOD BY AUTOMATED COUNT: 14.9 % (ref 12–16)
GFR/BSA.PRED SERPLBLD CYS-BASED-ARV: 72 ML/MIN/{1.73_M2}
GLUCOSE SERPL-MCNC: 86 MG/DL (ref 65–99)
HCT VFR BLD AUTO: 34 % (ref 35–43)
HDLC SERPL-MCNC: 43 MG/DL (ref 23–92)
HGB BLD-MCNC: 11.7 G/DL (ref 11.5–14.5)
LDLC SERPL CALC-MCNC: 145 MG/DL
LYMPHOCYTES # BLD AUTO: 1.4 THOU/CMM (ref 1–3)
LYMPHOCYTES NFR BLD AUTO: 29 %
MCH RBC QN AUTO: 34.4 PG (ref 26–34)
MCHC RBC AUTO-ENTMCNC: 34.3 G/DL (ref 32–37)
MCV RBC AUTO: 100 FL (ref 80–100)
MONOCYTES # BLD AUTO: 0.4 THOU/CMM (ref 0.3–1)
MONOCYTES NFR BLD AUTO: 9 %
NEUTROPHILS # BLD AUTO: 2.9 THOU/CMM (ref 1.8–7.8)
NEUTROPHILS NFR BLD AUTO: 59 %
NONHDLC SERPL-MCNC: 164 MG/DL
PLATELET # BLD AUTO: 385 THOU/CMM (ref 140–350)
PMV BLD REES-ECKER: 8.9 FL (ref 7.5–11.3)
POTASSIUM SERPL-SCNC: 4.3 MMOL/L (ref 3.5–5.2)
PROT SERPL-MCNC: 6.2 G/DL (ref 6.3–8.3)
RBC # BLD AUTO: 3.39 MILL/CMM (ref 3.7–4.7)
SODIUM SERPL-SCNC: 143 MMOL/L (ref 135–145)
TRIGL SERPL-MCNC: 93 MG/DL
WBC # BLD AUTO: 4.9 THOU/CMM (ref 4–10)

## 2025-01-18 DIAGNOSIS — F41.1 GAD (GENERALIZED ANXIETY DISORDER): ICD-10-CM

## 2025-01-20 RX ORDER — ALPRAZOLAM 0.5 MG
TABLET ORAL
Qty: 30 TABLET | Refills: 0 | OUTPATIENT
Start: 2025-01-20

## 2025-01-21 LAB — CREATINE SERPL-MCNC: 0.3 MG/DL

## 2025-01-22 ENCOUNTER — CONSULT (OUTPATIENT)
Dept: FAMILY MEDICINE CLINIC | Facility: CLINIC | Age: 60
End: 2025-01-22
Payer: COMMERCIAL

## 2025-01-22 VITALS
HEART RATE: 66 BPM | SYSTOLIC BLOOD PRESSURE: 102 MMHG | BODY MASS INDEX: 38.41 KG/M2 | WEIGHT: 225 LBS | HEIGHT: 64 IN | OXYGEN SATURATION: 95 % | DIASTOLIC BLOOD PRESSURE: 66 MMHG | TEMPERATURE: 97.3 F

## 2025-01-22 DIAGNOSIS — R73.01 IFG (IMPAIRED FASTING GLUCOSE): ICD-10-CM

## 2025-01-22 DIAGNOSIS — F51.01 PRIMARY INSOMNIA: ICD-10-CM

## 2025-01-22 DIAGNOSIS — G45.9 TIA (TRANSIENT ISCHEMIC ATTACK): ICD-10-CM

## 2025-01-22 DIAGNOSIS — F41.1 GAD (GENERALIZED ANXIETY DISORDER): ICD-10-CM

## 2025-01-22 DIAGNOSIS — Z01.818 PREOPERATIVE CLEARANCE: Primary | ICD-10-CM

## 2025-01-22 DIAGNOSIS — D68.9 COAGULOPATHY (HCC): ICD-10-CM

## 2025-01-22 DIAGNOSIS — I10 ESSENTIAL HYPERTENSION: ICD-10-CM

## 2025-01-22 DIAGNOSIS — I48.0 PAF (PAROXYSMAL ATRIAL FIBRILLATION) (HCC): ICD-10-CM

## 2025-01-22 DIAGNOSIS — M17.11 PRIMARY OSTEOARTHRITIS OF RIGHT KNEE: ICD-10-CM

## 2025-01-22 PROBLEM — F32.2 SEVERE MAJOR DEPRESSIVE DISORDER (HCC): Status: ACTIVE | Noted: 2025-01-22

## 2025-01-22 PROCEDURE — 99214 OFFICE O/P EST MOD 30 MIN: CPT | Performed by: FAMILY MEDICINE

## 2025-01-22 RX ORDER — ZOLPIDEM TARTRATE 10 MG/1
10 TABLET ORAL
Start: 2025-01-22

## 2025-01-22 RX ORDER — BUSPIRONE HYDROCHLORIDE 10 MG/1
10 TABLET ORAL 2 TIMES DAILY
Qty: 60 TABLET | Refills: 1 | Status: SHIPPED | OUTPATIENT
Start: 2025-01-22

## 2025-01-22 RX ORDER — MUPIROCIN 20 MG/G
OINTMENT TOPICAL 2 TIMES DAILY
COMMUNITY
Start: 2024-12-26

## 2025-01-22 NOTE — PROGRESS NOTES
Name: Lisa Tamayo      : 1965      MRN: [id number]  Encounter Provider: Ann Duarte DO  Encounter Date: 2025   Encounter department: [facility]  :  Assessment & Plan  Preoperative clearance  Cleared by cardiology with no recommendation for further testing.   Other medical issues optimized at present  Revised cardiac risk index shows 0.9% estimated rate of MI/pulmonary edema, Vfib, cardiac arrest, or CHB--pt low risk for moderate risk procedure  From an anticoagulation standpoint, after discussion with neuro, cardiology, and anesthesia, pt is to hold xarelto for 72 hours prior to surgery and should remain on aspirin for the procedure.  This was relayed to ortho.  Primary osteoarthritis of right knee  Mgmt per ortho       PAF (paroxysmal atrial fibrillation) (HCC)  Sinus bradycardia today  Continue current meds  72 hour hold on xarelto per cardiology       TIA (transient ischemic attack)  Continue ASA  Continue blood pressure control         Coagulopathy (HCC)  See a/c mgmt above       ANNABELLA (generalized anxiety disorder)  D/c xanax in AM and increase buspar to 10mg BID       Essential hypertension  Well-controlled on current meds  Continue same         Primary insomnia  Discussed eventual possible ambien d/c or change  Will discuss after surgery       IFG (impaired fasting glucose)  On zepbound  Last A1c nml              History of Present Illness   HPI  Pt presents in preparation for R TKR at [facility] with Dr Boogie on 25.  Pt has had months of pain and lost weight in prep for surgery.  She feels well overall.  She has failed injections.    Pt sees cardiology for hx of PAF.  They have cleared her from a cardiac standpoint and suggested holding Xarelto 48 hours prior to surgery.  She has had no recent palps, chest pain, shortness of breath.  Can climb 2 flights with groceries without chest pain, shortness of breath.  Can walk on a flat surface without chest pain,  "shortness of breath, calf pain.      Pt has hx of TIA at end of August 2024.  Since that time, she has been on ASA.  No weakness, paresthesias, headaches.  Sees neuro.  I did contact her neurologist today who feels that it would be beneficial to continue her ASA pre and perioperatively as long as ortho is in agreement.    Preoperative labs have been reviewed.  Cbc, cmp, nml.  Nml UA.  Nml cxr    EKG today shows sinus cat with nonspecific ST abnormalities.    Blood pressure is appropriate on current meds.  Last A1c 5.4    Pt wonders about cutting down some meds and switching her ambien.  Discussed xanax being the med I would recommend getting rid of first.  Takes this in AM for anxiety.  Is willing to increase buspar.    Review of Systems   Constitutional:  Negative for chills, fatigue, fever and unexpected weight change.   HENT:  Negative for congestion, ear pain, hearing loss, postnasal drip, rhinorrhea, sinus pressure, sinus pain, sore throat, trouble swallowing and voice change.    Eyes:  Negative for pain, redness and visual disturbance.   Respiratory:  Negative for cough and shortness of breath.    Cardiovascular:  Negative for chest pain, palpitations and leg swelling.   Gastrointestinal:  Negative for abdominal pain, constipation, diarrhea and nausea.   Endocrine: Negative for cold intolerance, heat intolerance, polydipsia and polyuria.   Genitourinary:  Negative for dysuria, frequency and urgency.   Musculoskeletal:  Negative for arthralgias, joint swelling and myalgias.   Skin:  Negative for rash.        No suspicious lesions   Neurological:  Negative for weakness, numbness and headaches.   Hematological:  Negative for adenopathy.       Objective   /66 (Patient Position: Sitting, Cuff Size: Large)   Pulse 66   Temp (!) 97.3 °F (36.3 °C) (Temporal)   Ht 5' 4\" (1.626 m)   Wt 102 kg (225 lb) Comment: per pt.  SpO2 95%   BMI 38.62 kg/m²      Physical Exam  Constitutional:       Appearance: Normal " appearance.   HENT:      Head: Normocephalic and atraumatic.      Right Ear: Tympanic membrane, ear canal and external ear normal.      Left Ear: Tympanic membrane, ear canal and external ear normal.      Nose: Nose normal. No congestion.      Mouth/Throat:      Mouth: Mucous membranes are moist.      Pharynx: No oropharyngeal exudate or posterior oropharyngeal erythema.   Eyes:      Extraocular Movements: Extraocular movements intact.      Conjunctiva/sclera: Conjunctivae normal.      Pupils: Pupils are equal, round, and reactive to light.   Neck:      Vascular: No carotid bruit.   Cardiovascular:      Rate and Rhythm: Normal rate and regular rhythm.      Heart sounds: No murmur heard.     No friction rub. No gallop.   Pulmonary:      Effort: Pulmonary effort is normal.      Breath sounds: No wheezing, rhonchi or rales.   Abdominal:      General: Abdomen is flat. There is no distension.      Palpations: Abdomen is soft.      Tenderness: There is no abdominal tenderness.   Musculoskeletal:      Cervical back: Neck supple.   Lymphadenopathy:      Cervical: No cervical adenopathy.   Neurological:      General: No focal deficit present.      Mental Status: She is alert and oriented to person, place, and time.      Cranial Nerves: No cranial nerve deficit.      Motor: No weakness.      Deep Tendon Reflexes: Reflexes normal.

## 2025-01-22 NOTE — PATIENT INSTRUCTIONS
Fine for surgery, but I'd like to hear from neuro re: aspirin.  I will be in touch      Generally:  I would love for you to stop taking xanax  Let's try buspar twice daily and stopping xanax  In the future, we're going to think about possible sleeper changes, but let's address after surgery  Am wondering about whether you really need the lisinopril, but again, we'll discuss that after surgery

## 2025-01-26 DIAGNOSIS — I48.0 PAF (PAROXYSMAL ATRIAL FIBRILLATION) (HCC): ICD-10-CM

## 2025-01-27 RX ORDER — METOPROLOL SUCCINATE 25 MG/1
25 TABLET, EXTENDED RELEASE ORAL DAILY
Qty: 90 TABLET | Refills: 1 | Status: SHIPPED | OUTPATIENT
Start: 2025-01-27

## 2025-02-02 ENCOUNTER — APPOINTMENT (EMERGENCY)
Dept: RADIOLOGY | Facility: HOSPITAL | Age: 60
End: 2025-02-02
Payer: COMMERCIAL

## 2025-02-02 ENCOUNTER — HOSPITAL ENCOUNTER (OUTPATIENT)
Facility: HOSPITAL | Age: 60
Setting detail: OBSERVATION
Discharge: HOME | End: 2025-02-03
Attending: EMERGENCY MEDICINE | Admitting: FAMILY MEDICINE
Payer: COMMERCIAL

## 2025-02-02 DIAGNOSIS — D64.9 ANEMIA: ICD-10-CM

## 2025-02-02 DIAGNOSIS — R55 SYNCOPE AND COLLAPSE: ICD-10-CM

## 2025-02-02 DIAGNOSIS — R55 SYNCOPE: Primary | ICD-10-CM

## 2025-02-02 PROBLEM — D62 ACUTE BLOOD LOSS ANEMIA: Status: ACTIVE | Noted: 2025-02-02

## 2025-02-02 PROBLEM — G47.09 OTHER INSOMNIA: Status: ACTIVE | Noted: 2025-02-02

## 2025-02-02 PROBLEM — Z96.651 STATUS POST RIGHT KNEE REPLACEMENT: Status: ACTIVE | Noted: 2025-02-02

## 2025-02-02 LAB
4HR DELTA HS TROPONIN: 0 NG/L
ALBUMIN SERPL BCG-MCNC: 3 G/DL (ref 3.5–5)
ALP SERPL-CCNC: 49 U/L (ref 34–104)
ALT SERPL W P-5'-P-CCNC: 15 U/L (ref 7–52)
ANION GAP SERPL CALCULATED.3IONS-SCNC: 8 MMOL/L (ref 4–13)
AST SERPL W P-5'-P-CCNC: 20 U/L (ref 13–39)
ATRIAL RATE: 56 BPM
BACTERIA UR QL AUTO: ABNORMAL /HPF
BASOPHILS # BLD AUTO: 0.05 THOUSANDS/ΜL (ref 0–0.1)
BASOPHILS NFR BLD AUTO: 0 % (ref 0–1)
BILIRUB SERPL-MCNC: 0.41 MG/DL (ref 0.2–1)
BILIRUB UR QL STRIP: ABNORMAL
BUN SERPL-MCNC: 18 MG/DL (ref 5–25)
CALCIUM ALBUM COR SERPL-MCNC: 8.5 MG/DL (ref 8.3–10.1)
CALCIUM SERPL-MCNC: 7.7 MG/DL (ref 8.4–10.2)
CARDIAC TROPONIN I PNL SERPL HS: 3 NG/L (ref ?–50)
CARDIAC TROPONIN I PNL SERPL HS: 3 NG/L (ref ?–50)
CHLORIDE SERPL-SCNC: 113 MMOL/L (ref 96–108)
CLARITY UR: CLEAR
CO2 SERPL-SCNC: 21 MMOL/L (ref 21–32)
COLOR UR: YELLOW
CREAT SERPL-MCNC: 0.85 MG/DL (ref 0.6–1.3)
EOSINOPHIL # BLD AUTO: 0.25 THOUSAND/ΜL (ref 0–0.61)
EOSINOPHIL NFR BLD AUTO: 2 % (ref 0–6)
ERYTHROCYTE [DISTWIDTH] IN BLOOD BY AUTOMATED COUNT: 14.5 % (ref 11.6–15.1)
GFR SERPL CREATININE-BSD FRML MDRD: 74 ML/MIN/1.73SQ M
GLUCOSE SERPL-MCNC: 143 MG/DL (ref 65–140)
GLUCOSE UR STRIP-MCNC: ABNORMAL MG/DL
HCT VFR BLD AUTO: 27.7 % (ref 34.8–46.1)
HGB BLD-MCNC: 8.7 G/DL (ref 11.5–15.4)
HGB UR QL STRIP.AUTO: NEGATIVE
HYALINE CASTS #/AREA URNS LPF: ABNORMAL /LPF
IMM GRANULOCYTES # BLD AUTO: 0.07 THOUSAND/UL (ref 0–0.2)
IMM GRANULOCYTES NFR BLD AUTO: 1 % (ref 0–2)
KETONES UR STRIP-MCNC: ABNORMAL MG/DL
LEUKOCYTE ESTERASE UR QL STRIP: NEGATIVE
LYMPHOCYTES # BLD AUTO: 1.66 THOUSANDS/ΜL (ref 0.6–4.47)
LYMPHOCYTES NFR BLD AUTO: 14 % (ref 14–44)
MCH RBC QN AUTO: 33.3 PG (ref 26.8–34.3)
MCHC RBC AUTO-ENTMCNC: 31.4 G/DL (ref 31.4–37.4)
MCV RBC AUTO: 106 FL (ref 82–98)
MONOCYTES # BLD AUTO: 0.92 THOUSAND/ΜL (ref 0.17–1.22)
MONOCYTES NFR BLD AUTO: 8 % (ref 4–12)
MUCOUS THREADS UR QL AUTO: ABNORMAL
NEUTROPHILS # BLD AUTO: 8.75 THOUSANDS/ΜL (ref 1.85–7.62)
NEUTS SEG NFR BLD AUTO: 75 % (ref 43–75)
NITRITE UR QL STRIP: NEGATIVE
NON-SQ EPI CELLS URNS QL MICRO: ABNORMAL /HPF
NRBC BLD AUTO-RTO: 0 /100 WBCS
P AXIS: 0 DEGREES
PH UR STRIP.AUTO: 6 [PH] (ref 4.5–8)
PLATELET # BLD AUTO: 391 THOUSANDS/UL (ref 149–390)
PMV BLD AUTO: 11.3 FL (ref 8.9–12.7)
POTASSIUM SERPL-SCNC: 3.6 MMOL/L (ref 3.5–5.3)
PROT SERPL-MCNC: 5.6 G/DL (ref 6.4–8.4)
PROT UR STRIP-MCNC: ABNORMAL MG/DL
QRS AXIS: 30 DEGREES
QRSD INTERVAL: 92 MS
QT INTERVAL: 458 MS
QTC INTERVAL: 429 MS
RBC # BLD AUTO: 2.61 MILLION/UL (ref 3.81–5.12)
RBC #/AREA URNS AUTO: ABNORMAL /HPF
SODIUM SERPL-SCNC: 142 MMOL/L (ref 135–147)
SP GR UR STRIP.AUTO: 1.02 (ref 1–1.03)
T WAVE AXIS: 29 DEGREES
TSH SERPL DL<=0.05 MIU/L-ACNC: 2.25 UIU/ML (ref 0.45–4.5)
UROBILINOGEN UR QL STRIP.AUTO: 4 E.U./DL
VENTRICULAR RATE: 53 BPM
WBC # BLD AUTO: 11.7 THOUSAND/UL (ref 4.31–10.16)
WBC #/AREA URNS AUTO: ABNORMAL /HPF

## 2025-02-02 PROCEDURE — 99223 1ST HOSP IP/OBS HIGH 75: CPT | Performed by: FAMILY MEDICINE

## 2025-02-02 PROCEDURE — 93005 ELECTROCARDIOGRAM TRACING: CPT

## 2025-02-02 PROCEDURE — 70450 CT HEAD/BRAIN W/O DYE: CPT

## 2025-02-02 PROCEDURE — 36415 COLL VENOUS BLD VENIPUNCTURE: CPT | Performed by: EMERGENCY MEDICINE

## 2025-02-02 PROCEDURE — 80053 COMPREHEN METABOLIC PANEL: CPT | Performed by: EMERGENCY MEDICINE

## 2025-02-02 PROCEDURE — 99284 EMERGENCY DEPT VISIT MOD MDM: CPT

## 2025-02-02 PROCEDURE — 99285 EMERGENCY DEPT VISIT HI MDM: CPT | Performed by: EMERGENCY MEDICINE

## 2025-02-02 PROCEDURE — 84443 ASSAY THYROID STIM HORMONE: CPT | Performed by: FAMILY MEDICINE

## 2025-02-02 PROCEDURE — 85025 COMPLETE CBC W/AUTO DIFF WBC: CPT | Performed by: EMERGENCY MEDICINE

## 2025-02-02 PROCEDURE — 93010 ELECTROCARDIOGRAM REPORT: CPT | Performed by: INTERNAL MEDICINE

## 2025-02-02 PROCEDURE — 81001 URINALYSIS AUTO W/SCOPE: CPT

## 2025-02-02 PROCEDURE — 84484 ASSAY OF TROPONIN QUANT: CPT | Performed by: EMERGENCY MEDICINE

## 2025-02-02 RX ORDER — ATORVASTATIN CALCIUM 40 MG/1
40 TABLET, FILM COATED ORAL EVERY EVENING
Status: DISCONTINUED | OUTPATIENT
Start: 2025-02-03 | End: 2025-02-03 | Stop reason: HOSPADM

## 2025-02-02 RX ORDER — EPINEPHRINE 0.1 MG/ML
1 SYRINGE (ML) INJECTION ONCE
Status: COMPLETED | OUTPATIENT
Start: 2025-02-02 | End: 2025-02-02

## 2025-02-02 RX ORDER — BUPROPION HYDROCHLORIDE 100 MG/1
100 TABLET, EXTENDED RELEASE ORAL 2 TIMES DAILY
Status: DISCONTINUED | OUTPATIENT
Start: 2025-02-02 | End: 2025-02-02

## 2025-02-02 RX ORDER — METOPROLOL SUCCINATE 25 MG/1
25 TABLET, EXTENDED RELEASE ORAL DAILY
Status: DISCONTINUED | OUTPATIENT
Start: 2025-02-03 | End: 2025-02-03 | Stop reason: HOSPADM

## 2025-02-02 RX ORDER — GABAPENTIN 400 MG/1
800 CAPSULE ORAL 3 TIMES DAILY
Status: DISCONTINUED | OUTPATIENT
Start: 2025-02-02 | End: 2025-02-03 | Stop reason: HOSPADM

## 2025-02-02 RX ORDER — ASPIRIN 81 MG/1
81 TABLET, CHEWABLE ORAL DAILY
Status: DISCONTINUED | OUTPATIENT
Start: 2025-02-03 | End: 2025-02-03 | Stop reason: HOSPADM

## 2025-02-02 RX ORDER — FLECAINIDE ACETATE 50 MG/1
50 TABLET ORAL 2 TIMES DAILY
Status: DISCONTINUED | OUTPATIENT
Start: 2025-02-02 | End: 2025-02-03 | Stop reason: HOSPADM

## 2025-02-02 RX ORDER — ZOLPIDEM TARTRATE 5 MG/1
10 TABLET ORAL
Status: DISCONTINUED | OUTPATIENT
Start: 2025-02-02 | End: 2025-02-03 | Stop reason: HOSPADM

## 2025-02-02 RX ORDER — MUPIROCIN 20 MG/G
OINTMENT TOPICAL 2 TIMES DAILY
Status: DISCONTINUED | OUTPATIENT
Start: 2025-02-02 | End: 2025-02-02

## 2025-02-02 RX ORDER — LISINOPRIL 10 MG/1
10 TABLET ORAL DAILY
Status: DISCONTINUED | OUTPATIENT
Start: 2025-02-03 | End: 2025-02-03 | Stop reason: HOSPADM

## 2025-02-02 RX ORDER — ACETAMINOPHEN 325 MG/1
650 TABLET ORAL EVERY 6 HOURS PRN
Status: DISCONTINUED | OUTPATIENT
Start: 2025-02-02 | End: 2025-02-03 | Stop reason: HOSPADM

## 2025-02-02 RX ORDER — BUPROPION HYDROCHLORIDE 150 MG/1
150 TABLET ORAL DAILY
Status: DISCONTINUED | OUTPATIENT
Start: 2025-02-02 | End: 2025-02-03 | Stop reason: HOSPADM

## 2025-02-02 RX ORDER — BUSPIRONE HYDROCHLORIDE 10 MG/1
10 TABLET ORAL 2 TIMES DAILY
Status: DISCONTINUED | OUTPATIENT
Start: 2025-02-02 | End: 2025-02-03 | Stop reason: HOSPADM

## 2025-02-02 RX ORDER — TOPIRAMATE 25 MG/1
25 TABLET, FILM COATED ORAL 2 TIMES DAILY
Status: DISCONTINUED | OUTPATIENT
Start: 2025-02-02 | End: 2025-02-03 | Stop reason: HOSPADM

## 2025-02-02 RX ORDER — SODIUM CHLORIDE 9 MG/ML
50 INJECTION, SOLUTION INTRAVENOUS CONTINUOUS
Status: DISCONTINUED | OUTPATIENT
Start: 2025-02-02 | End: 2025-02-03 | Stop reason: HOSPADM

## 2025-02-02 RX ORDER — OXYCODONE HYDROCHLORIDE 10 MG/1
10 TABLET ORAL EVERY 4 HOURS PRN
Refills: 0 | Status: DISCONTINUED | OUTPATIENT
Start: 2025-02-02 | End: 2025-02-03 | Stop reason: HOSPADM

## 2025-02-02 RX ORDER — OXYCODONE HYDROCHLORIDE 5 MG/1
5 TABLET ORAL EVERY 4 HOURS PRN
Refills: 0 | Status: DISCONTINUED | OUTPATIENT
Start: 2025-02-02 | End: 2025-02-03 | Stop reason: HOSPADM

## 2025-02-02 RX ADMIN — GABAPENTIN 800 MG: 400 CAPSULE ORAL at 20:48

## 2025-02-02 RX ADMIN — FLECAINIDE ACETATE 50 MG: 50 TABLET ORAL at 21:06

## 2025-02-02 RX ADMIN — TOPIRAMATE 25 MG: 25 TABLET, FILM COATED ORAL at 21:57

## 2025-02-02 RX ADMIN — ACETAMINOPHEN 650 MG: 325 TABLET, FILM COATED ORAL at 20:48

## 2025-02-02 RX ADMIN — SODIUM CHLORIDE 50 ML/HR: 0.9 INJECTION, SOLUTION INTRAVENOUS at 19:03

## 2025-02-02 RX ADMIN — ZOLPIDEM TARTRATE 10 MG: 5 TABLET, COATED ORAL at 21:56

## 2025-02-02 RX ADMIN — BUSPIRONE HYDROCHLORIDE 10 MG: 10 TABLET ORAL at 21:59

## 2025-02-02 RX ADMIN — OXYCODONE HYDROCHLORIDE 10 MG: 10 TABLET ORAL at 20:48

## 2025-02-02 RX ADMIN — BUPROPION HYDROCHLORIDE 150 MG: 150 TABLET, EXTENDED RELEASE ORAL at 22:49

## 2025-02-02 NOTE — ASSESSMENT & PLAN NOTE
On Wellbutrin 100 mg daily, will continue  Reports being on sertraline as well but not been taking for the last week or so

## 2025-02-02 NOTE — ASSESSMENT & PLAN NOTE
Rehospitalization for right knee replacement on 1/29  Was cleared by orthopedic surgeon to resume anticoagulation, she resume her Xarelto, but not aspirin  Denies any change in her knee pain, swelling or skin changes  Patient has been taking oxycodone at home for pain, will continue

## 2025-02-02 NOTE — ED PROCEDURE NOTE
PROCEDURE  ECG 12 Lead Documentation Only    Date/Time: 2/2/2025 3:23 PM    Performed by: Cleveland Brennan MD  Authorized by: Cleveland Brennan MD    Indications / Diagnosis:  Syncope  ECG reviewed by me, the ED Provider: yes    Patient location:  ED  Previous ECG:     Previous ECG:  Compared to current    Comparison ECG info:  August 20, 2024    Similarity:  No change  Interpretation:     Interpretation: non-specific    Rate:     ECG rate:  53    ECG rate assessment: bradycardic    Rhythm:     Rhythm: sinus bradycardia    Ectopy:     Ectopy: none    QRS:     QRS axis:  Normal    QRS intervals:  Normal  Conduction:     Conduction: normal    ST segments:     ST segments:  Normal  T waves:     T waves: non-specific         Cleveland Brennan MD  02/02/25 1526       Cleveland Brennan MD  02/02/25 1526

## 2025-02-02 NOTE — H&P
H&P - Hospitalist   Name: Lisa Tamayo 59 y.o. female I MRN: [id number]  Unit/Bed#: ED 21 I Date of Admission: 2/2/2025   Date of Service: 2/2/2025 I Hospital Day: 0     Assessment & Plan  Syncope  As per patient she lost consciousness morning first time, but she has been complaining of dizziness for the last couple weeks sporadically; denies any shakiness or loss of bladder/bowel control  Admitted under observation  Sinus bradycardia on EKG; but history of paroxysmal A-fib  Will place on telemetry, consider cardiology consult if abnormal telemetry  Check echo; most recent done 8/2024-EF 60%, normal diastolic function  Report decreased oral intake for the last couple days, will gently hydrate  Check orthostatic    Acute blood loss anemia  Status post right knee replacement on 1/29  Hemoglobin 10.5 on the date of surgery, on admission globin is 8.7  Denies any obvious bleeding  Will continue to hold aspirin for now, but continue Xarelto  Monitor CBC; may resume aspirin tomorrow if hemoglobin stable  Essential hypertension  On lisinopril 10 mg, metoprolol succinate 25 mg,  Blood pressure soft upon admission, but now in 130s  Will continue metoprolol for rate controlled, hold lisinopril for now  Mixed hyperlipidemia  Continue atorvastatin  PAF (paroxysmal atrial fibrillation) (HCC)  Paroxysmal, follows with cardiology, Dr. Sarah, last seen 12/3/24  Currently in sinus rhythm  On Xarelto, flecainide and metoprolol-will continue  Depression, recurrent (HCC)  On Wellbutrin 100 mg daily, will continue  Reports being on sertraline as well but not been taking for the last week or so  Chronic headache  On Topamax 25 mg daily, will continue same  Status post right knee replacement  Rehospitalization for right knee replacement on 1/29  Was cleared by orthopedic surgeon to resume anticoagulation, she resume her Xarelto, but not aspirin  Denies any change in her knee pain, swelling or skin changes  Patient has been taking  oxycodone at home for pain, will continue  Other insomnia  On Ambien at home, will continue to avoid withdrawals      VTE Pharmacologic Prophylaxis: VTE Score: 3 Moderate Risk (Score 3-4) - Pharmacological DVT Prophylaxis Ordered: rivaroxaban (Xarelto).  Code Status: Level 1 - Full Code   Discussion with family: Updated  (son) at bedside.    Anticipated Length of Stay: Patient will be admitted on an observation basis with an anticipated length of stay of less than 2 midnights secondary to syncope work up, monitor hemoglobin.    History of Present Illness   Chief Complaint: Syncope    Lisa Tamayo is a 59 y.o. female with a PMH of paroxysmal A-fib, osteoarthritis/status post right knee arthroplasty (1/29/25), depression, chronic headache who presents with syncope.  As per family members she was taking a shower sitting in the shower chair and then she lost consciousness; not sure if she had.  CT head in ED was unrevealing.  He denies any specific complaints right now except continued pain in her right knee postsurgery. Patient was placed on observation under medicine team for further workup.    Review of Systems   Constitutional:  Negative for chills, diaphoresis, fatigue and fever.   HENT:  Negative for congestion, ear discharge, ear pain, mouth sores, rhinorrhea, sore throat and trouble swallowing.    Eyes:  Negative for photophobia, pain and discharge.   Respiratory:  Negative for cough, chest tightness, shortness of breath and wheezing.    Cardiovascular:  Negative for chest pain, palpitations and leg swelling.   Gastrointestinal:  Negative for abdominal distention, abdominal pain, blood in stool, constipation, diarrhea and nausea.   Genitourinary:  Negative for difficulty urinating and frequency.   Musculoskeletal:  Positive for arthralgias (right knee). Negative for joint swelling and neck stiffness.   Skin:  Negative for color change, pallor and rash.   Neurological:  Positive for dizziness  and syncope. Negative for numbness and headaches.       Historical Information   Past Medical History:   Diagnosis Date    A-fib (HCC)     Allergic     Anemia     Anxiety     Arthritis     Knees and back     Depression     Fall     Headache(784.0)     Hiatal hernia     Hyperlipidemia     Hypertension     Morbid obesity (HCC)     Obesity     Pneumonia     Stroke (HCC)      Past Surgical History:   Procedure Laterality Date    BACK SURGERY  1998    ESOPHAGOGASTRODUODENOSCOPY  10/17/2016    HERNIA REPAIR  10/2016    HIATAL HERNIA REPAIR  10/17/2016    CIRO Tello DO       LUMBAR DISC SURGERY  05/1998    Approx    PARTIAL GASTRECTOMY  10/17/2016    CIRO Tello DO       SLEEVE GASTROPLASTY  10/2016    SPINE SURGERY      25 years ago    VAGINAL DELIVERY      x1     Social History     Tobacco Use    Smoking status: Never     Passive exposure: Never    Smokeless tobacco: Never   Vaping Use    Vaping status: Never Used   Substance and Sexual Activity    Alcohol use: Yes     Comment: On occasion … not on a regular basis    Drug use: Never    Sexual activity: Not Currently     Partners: Male     E-Cigarette/Vaping    E-Cigarette Use Never User      E-Cigarette/Vaping Substances    Nicotine No     THC No     CBD No     Flavoring No     Other No     Unknown No      Family History   Problem Relation Age of Onset    Arthritis Mother     COPD Mother     Diabetes Mother     Obesity Mother     Coronary artery disease Mother     Coronary artery disease Father     Diabetes Father     Heart attack Father     Heart disease Father     Obesity Father     Early death Father     Alzheimer's disease Paternal Grandfather     Dementia Paternal Grandfather      Social History:  Marital Status:    Occupation: front office cle  Patient Pre-hospital Living Situation: Alone  Patient Pre-hospital Level of Mobility: walks with walker  Patient Pre-hospital Diet Restrictions: cardiac    Meds/Allergies   I have reviewed home  medications with patient personally.  Prior to Admission medications    Medication Sig Start Date End Date Taking? Authorizing Provider   aspirin (Aspirin Low Dose) 81 mg chewable tablet Chew 1 tablet (81 mg total) daily Chew 1/4/25  Yes Ann Duarte DO   atorvastatin (LIPITOR) 40 mg tablet Take 1 tablet (40 mg total) by mouth every evening 1/12/25  Yes Ann Duarte DO   buPROPion (Wellbutrin SR) 100 mg 12 hr tablet Take 1 tablet (100 mg total) by mouth 2 (two) times a day 7/29/24  Yes Sarah Marie PA-C   buPROPion (WELLBUTRIN SR) 100 mg 12 hr tablet Take 1 tablet (100 mg total) by mouth 2 (two) times a day 1/6/25  Yes Jazmine Montgomery PA-C   busPIRone (BUSPAR) 10 mg tablet Take 1 tablet (10 mg total) by mouth 2 (two) times a day 1/22/25  Yes Ann Duarte DO   flecainide (TAMBOCOR) 50 mg tablet TAKE ONE TABLET BY MOUTH TWICE A DAY 12/30/24  Yes Hoang Case MD   gabapentin (NEURONTIN) 800 mg tablet Take 1 tablet (800 mg total) by mouth 3 (three) times a day 1/4/25  Yes Ann Duarte DO   lisinopril (ZESTRIL) 10 mg tablet Take 1 tablet (10 mg total) by mouth daily 10/22/24  Yes Ann Duarte DO   metoprolol succinate (TOPROL-XL) 25 mg 24 hr tablet Take 1 tablet (25 mg total) by mouth daily 1/27/25  Yes Girish Sarah MD   rivaroxaban (Xarelto) 20 mg tablet TAKE ONE TABLET BY MOUTH EVERY DAY 9/23/24  Yes Girish Sarah MD   topiramate (Topamax) 25 mg tablet Take 1 tablet (25 mg total) by mouth 2 (two) times a day 12/9/24  Yes Danna Dejesus MD   Zepbound 15 MG/0.5ML auto-injector INJECT 0.5 ML UNDER THE SKIN ONCE A WEEK 1/2/25  Yes Ann Duarte DO   zolpidem (AMBIEN) 10 mg tablet Take 1 tablet (10 mg total) by mouth daily at bedtime as needed for sleep 1/22/25  Yes Ann Duarte,    mupirocin (BACTROBAN) 2 % ointment Apply topically 2 (two) times a day  Patient not taking: Reported on 2/2/2025 12/26/24   Historical Provider, MD   sertraline (ZOLOFT) 100 mg tablet Take 1.5 tablets (150 mg total) by mouth daily  Patient  not taking: Reported on 2/2/2025 10/22/24   Ann Duarte, DO     Allergies   Allergen Reactions    Fentanyl Itching       Objective :  Temp:  [97.5 °F (36.4 °C)] 97.5 °F (36.4 °C)  HR:  [55-56] 56  BP: (109-133)/(55-63) 133/63  Resp:  [18-20] 20  SpO2:  [99 %-100 %] 99 %  O2 Device: None (Room air)    Physical Exam  Constitutional:       Appearance: Normal appearance.   HENT:      Head: Normocephalic.      Nose: Nose normal.      Mouth/Throat:      Mouth: Mucous membranes are moist.   Eyes:      Extraocular Movements: Extraocular movements intact.      Pupils: Pupils are equal, round, and reactive to light.   Cardiovascular:      Rate and Rhythm: Normal rate and regular rhythm.      Heart sounds: Normal heart sounds. No murmur heard.  Pulmonary:      Effort: Pulmonary effort is normal. No respiratory distress.      Breath sounds: Normal breath sounds.   Abdominal:      General: Bowel sounds are normal. There is no distension.      Palpations: Abdomen is soft.   Musculoskeletal:         General: Normal range of motion.      Cervical back: Normal range of motion.   Skin:     General: Skin is warm and dry.      Capillary Refill: Capillary refill takes less than 2 seconds.   Neurological:      General: No focal deficit present.      Mental Status: She is alert. Mental status is at baseline.   Psychiatric:         Mood and Affect: Mood normal.          Lines/Drains:            Lab Results: I have reviewed the following results:  Results from last 7 days   Lab Units 02/02/25  1400   WBC Thousand/uL 11.70*   HEMOGLOBIN g/dL 8.7*   HEMATOCRIT % 27.7*   PLATELETS Thousands/uL 391*   SEGS PCT % 75   LYMPHO PCT % 14   MONO PCT % 8   EOS PCT % 2     Results from last 7 days   Lab Units 02/02/25  1400   SODIUM mmol/L 142   POTASSIUM mmol/L 3.6   CHLORIDE mmol/L 113*   CO2 mmol/L 21   BUN mg/dL 18   CREATININE mg/dL 0.85   ANION GAP mmol/L 8   CALCIUM mg/dL 7.7*   ALBUMIN g/dL 3.0*   TOTAL BILIRUBIN mg/dL 0.41   ALK PHOS U/L 49    ALT U/L 15   AST U/L 20   GLUCOSE RANDOM mg/dL 143*             Lab Results   Component Value Date    HGBA1C 5.3 08/29/2024    HGBA1C 5.7 (H) 04/27/2023    HGBA1C 5.7 04/27/2023           Imaging Results Review: I reviewed radiology reports from this admission including: CT head.  Other Study Results Review: EKG was reviewed.     Administrative Statements   I have spent a total time of 65 minutes in caring for this patient on the day of the visit/encounter including Prognosis, Risks and benefits of tx options, Patient and family education, Impressions, Counseling / Coordination of care, Documenting in the medical record, Reviewing / ordering tests, medicine, procedures  , Obtaining or reviewing history  , and Communicating with other healthcare professionals .    ** Please Note: This note has been constructed using a voice recognition system. **

## 2025-02-02 NOTE — ED PROVIDER NOTES
Emergency Department Note- Lisa Tamayo 59 y.o. female MRN: [id number]    Unit/Bed#: ED 21 Encounter: [id number]    Lisa Tamayo is a 59 y.o. female who presents with   Chief Complaint   Patient presents with    Syncope     Knee replacement R on Wednesday. Syncope in shower chair. GCS 15         History of Present Illness   HPI:  Lisa Tamayo is a 59 y.o. female who presents for evaluation of:  Near syncopal episode while in the shower just prior to arrival.  Patient noted that she felt weak and dizzy in the shower and slumped to the ground.  She denies loss of consciousness, headache, nausea, and vomiting.  He denies any chest pain, dyspnea, posterior thoracic pain, leg cramping, pleuritic chest pain, cough, cough productive of hemoptysis, and sputum production.    Review of Systems   Constitutional:  Negative for fatigue and fever.   HENT:  Negative for congestion and sore throat.    Respiratory:  Negative for cough and shortness of breath.    Cardiovascular:  Negative for chest pain and palpitations.   Gastrointestinal:  Negative for abdominal pain and nausea.   Genitourinary:  Negative for flank pain and frequency.   Neurological:  Positive for dizziness and light-headedness. Negative for headaches.   Psychiatric/Behavioral:  Negative for dysphoric mood and hallucinations.    All other systems reviewed and are negative.      Historical Information   Past Medical History:   Diagnosis Date    A-fib (HCC)     Allergic     Anemia     Anxiety     Arthritis     Knees and back     Depression     Fall     Headache(784.0)     Hiatal hernia     Hyperlipidemia     Hypertension     Morbid obesity (HCC)     Obesity     Pneumonia     Stroke (HCC)      Past Surgical History:   Procedure Laterality Date    BACK SURGERY  1998    ESOPHAGOGASTRODUODENOSCOPY  10/17/2016    HERNIA REPAIR  10/2016    HIATAL HERNIA REPAIR  10/17/2016    CIRO Tello DO       LUMBAR DISC SURGERY  05/1998    Approx    PARTIAL  GASTRECTOMY  10/17/2016    CIRO Connor Tello DO       SLEEVE GASTROPLASTY  10/2016    SPINE SURGERY      25 years ago    VAGINAL DELIVERY      x1     Social History   Social History     Substance and Sexual Activity   Alcohol Use Yes    Comment: On occasion … not on a regular basis     Social History     Substance and Sexual Activity   Drug Use Never     Social History     Tobacco Use   Smoking Status Never    Passive exposure: Never   Smokeless Tobacco Never     Family History:   Family History   Problem Relation Age of Onset    Arthritis Mother     COPD Mother     Diabetes Mother     Obesity Mother     Coronary artery disease Mother     Coronary artery disease Father     Diabetes Father     Heart attack Father     Heart disease Father     Obesity Father     Early death Father     Alzheimer's disease Paternal Grandfather     Dementia Paternal Grandfather        Meds/Allergies   PTA meds:   Prior to Admission Medications   Prescriptions Last Dose Informant Patient Reported? Taking?   Zepbound 15 MG/0.5ML auto-injector   No No   Sig: INJECT 0.5 ML UNDER THE SKIN ONCE A WEEK   aspirin (Aspirin Low Dose) 81 mg chewable tablet   No No   Sig: Chew 1 tablet (81 mg total) daily Chew   atorvastatin (LIPITOR) 40 mg tablet   No No   Sig: Take 1 tablet (40 mg total) by mouth every evening   buPROPion (WELLBUTRIN SR) 100 mg 12 hr tablet   No No   Sig: Take 1 tablet (100 mg total) by mouth 2 (two) times a day   buPROPion (Wellbutrin SR) 100 mg 12 hr tablet  Other (Specify), Self No No   Sig: Take 1 tablet (100 mg total) by mouth 2 (two) times a day   busPIRone (BUSPAR) 10 mg tablet   No No   Sig: Take 1 tablet (10 mg total) by mouth 2 (two) times a day   flecainide (TAMBOCOR) 50 mg tablet   No No   Sig: TAKE ONE TABLET BY MOUTH TWICE A DAY   gabapentin (NEURONTIN) 800 mg tablet   No No   Sig: Take 1 tablet (800 mg total) by mouth 3 (three) times a day   lisinopril (ZESTRIL) 10 mg tablet  Other (Specify), Self No No   Sig: Take 1  tablet (10 mg total) by mouth daily   metoprolol succinate (TOPROL-XL) 25 mg 24 hr tablet   No No   Sig: Take 1 tablet (25 mg total) by mouth daily   mupirocin (BACTROBAN) 2 % ointment   Yes No   Sig: Apply topically 2 (two) times a day   rivaroxaban (Xarelto) 20 mg tablet  Other (Specify), Self No No   Sig: TAKE ONE TABLET BY MOUTH EVERY DAY   sertraline (ZOLOFT) 100 mg tablet  Other (Specify), Self No No   Sig: Take 1.5 tablets (150 mg total) by mouth daily   topiramate (Topamax) 25 mg tablet   No No   Sig: Take 1 tablet (25 mg total) by mouth 2 (two) times a day   zolpidem (AMBIEN) 10 mg tablet   No No   Sig: Take 1 tablet (10 mg total) by mouth daily at bedtime as needed for sleep      Facility-Administered Medications: None     Allergies   Allergen Reactions    Fentanyl Itching       Objective   First Vitals:   Blood Pressure: 109/55 (02/02/25 1300)  Pulse: 55 (02/02/25 1300)  Temperature: 97.5 °F (36.4 °C) (02/02/25 1301)  Temp Source: Oral (02/02/25 1301)  Respirations: 18 (02/02/25 1300)  Weight - Scale: 102 kg (225 lb) (02/02/25 1300)  SpO2: 100 % (02/02/25 1300)    Current Vitals:   Blood Pressure: 109/55 (02/02/25 1300)  Pulse: 55 (02/02/25 1300)  Temperature: 97.5 °F (36.4 °C) (02/02/25 1301)  Temp Source: Oral (02/02/25 1301)  Respirations: 18 (02/02/25 1300)  Weight - Scale: 102 kg (225 lb) (02/02/25 1300)  SpO2: 100 % (02/02/25 1300)    No intake or output data in the 24 hours ending 02/02/25 1521    Invasive Devices       Peripheral Intravenous Line  Duration             Peripheral IV 02/02/25 Right Antecubital <1 day                    Physical Exam  Vitals and nursing note reviewed.   Constitutional:       General: She is not in acute distress.     Appearance: Normal appearance. She is well-developed.   HENT:      Head: Normocephalic and atraumatic.      Right Ear: External ear normal.      Left Ear: External ear normal.      Nose: Nose normal.      Mouth/Throat:      Pharynx: No oropharyngeal  exudate.   Eyes:      Conjunctiva/sclera: Conjunctivae normal.      Pupils: Pupils are equal, round, and reactive to light.   Cardiovascular:      Rate and Rhythm: Normal rate and regular rhythm.   Pulmonary:      Effort: Pulmonary effort is normal. No respiratory distress.   Abdominal:      General: Abdomen is flat. There is no distension.      Palpations: Abdomen is soft.   Musculoskeletal:         General: No deformity. Normal range of motion.      Cervical back: Normal range of motion and neck supple.   Skin:     General: Skin is warm and dry.      Capillary Refill: Capillary refill takes less than 2 seconds.   Neurological:      General: No focal deficit present.      Mental Status: She is alert and oriented to person, place, and time. Mental status is at baseline.      Coordination: Coordination normal.   Psychiatric:         Mood and Affect: Mood normal.         Behavior: Behavior normal.         Thought Content: Thought content normal.         Judgment: Judgment normal.         Healing wound over left knee without evidence of infection or drainage of pus.  Medical Decision Makin.  Near syncope:Complete blood count obtained to assess for leukocytosis and anemia; Basic Metabolic profile obtained to assess for electrolyte disturbance, uremia, and disorders of glucose metabolism; electrocardiogram obtained to assess for arrhythmia; Troponin obtained to assess for myocardial infarction and myocarditis.    2.  Status post knee replacement left knee: Without evidence of infection.  I spoke to the son and daughter-in-law who witnessed the event; they state that the patient had a syncopal event without evidence of myoclonus.  They note that the patient restarted her apixaban today and she has been on her aspirin.  Plan to obtain a CT scan of the head and participate in shared decision making regarding patient disposition.  Recent Results (from the past 36 hours)   ECG 12 lead    Collection Time: 25 12:58  "PM   Result Value Ref Range    Ventricular Rate 53 BPM    Atrial Rate 56 BPM    CA Interval  ms    QRSD Interval 92 ms    QT Interval 458 ms    QTC Interval 429 ms    P Axis 0 degrees    QRS Axis 30 degrees    T Wave Axis 29 degrees   CBC and differential    Collection Time: 02/02/25  2:00 PM   Result Value Ref Range    WBC 11.70 (H) 4.31 - 10.16 Thousand/uL    RBC 2.61 (L) 3.81 - 5.12 Million/uL    Hemoglobin 8.7 (L) 11.5 - 15.4 g/dL    Hematocrit 27.7 (L) 34.8 - 46.1 %     (H) 82 - 98 fL    MCH 33.3 26.8 - 34.3 pg    MCHC 31.4 31.4 - 37.4 g/dL    RDW 14.5 11.6 - 15.1 %    MPV 11.3 8.9 - 12.7 fL    Platelets 391 (H) 149 - 390 Thousands/uL    nRBC 0 /100 WBCs    Segmented % 75 43 - 75 %    Immature Grans % 1 0 - 2 %    Lymphocytes % 14 14 - 44 %    Monocytes % 8 4 - 12 %    Eosinophils Relative 2 0 - 6 %    Basophils Relative 0 0 - 1 %    Absolute Neutrophils 8.75 (H) 1.85 - 7.62 Thousands/µL    Absolute Immature Grans 0.07 0.00 - 0.20 Thousand/uL    Absolute Lymphocytes 1.66 0.60 - 4.47 Thousands/µL    Absolute Monocytes 0.92 0.17 - 1.22 Thousand/µL    Eosinophils Absolute 0.25 0.00 - 0.61 Thousand/µL    Basophils Absolute 0.05 0.00 - 0.10 Thousands/µL   Comprehensive metabolic panel    Collection Time: 02/02/25  2:00 PM   Result Value Ref Range    Sodium 142 135 - 147 mmol/L    Potassium 3.6 3.5 - 5.3 mmol/L    Chloride 113 (H) 96 - 108 mmol/L    CO2 21 21 - 32 mmol/L    ANION GAP 8 4 - 13 mmol/L    BUN 18 5 - 25 mg/dL    Creatinine 0.85 0.60 - 1.30 mg/dL    Glucose 143 (H) 65 - 140 mg/dL    Calcium 7.7 (L) 8.4 - 10.2 mg/dL    Corrected Calcium 8.5 8.3 - 10.1 mg/dL    AST 20 13 - 39 U/L    ALT 15 7 - 52 U/L    Alkaline Phosphatase 49 34 - 104 U/L    Total Protein 5.6 (L) 6.4 - 8.4 g/dL    Albumin 3.0 (L) 3.5 - 5.0 g/dL    Total Bilirubin 0.41 0.20 - 1.00 mg/dL    eGFR 74 ml/min/1.73sq m   HS Troponin 0hr (reflex protocol)    Collection Time: 02/02/25  2:00 PM   Result Value Ref Range    hs TnI 0hr 3 \"Refer " "to ACS Flowchart\"- see link ng/L     No orders to display         Portions of the record may have been created with voice recognition software. Occasional wrong word or \"sound a like\" substitutions may have occurred due to the inherent limitations of voice recognition software.  Read the chart carefully and recognize, using context, where substitutions have occurred.         Cleveland Brennan MD  02/02/25 1327       Cleveland Brennan MD  02/02/25 1521       Cleveland Brennan MD  02/02/25 1527    "

## 2025-02-02 NOTE — ASSESSMENT & PLAN NOTE
On lisinopril 10 mg, metoprolol succinate 25 mg,  Blood pressure soft upon admission, but now in 130s  Will continue metoprolol for rate controlled, hold lisinopril for now

## 2025-02-02 NOTE — ASSESSMENT & PLAN NOTE
Paroxysmal, follows with cardiology, Dr. Sarah, last seen 12/3/24  Currently in sinus rhythm  On Xarelto, flecainide and metoprolol-will continue

## 2025-02-02 NOTE — ASSESSMENT & PLAN NOTE
As per patient she lost consciousness morning first time, but she has been complaining of dizziness for the last couple weeks sporadically; denies any shakiness or loss of bladder/bowel control  Admitted under observation  Sinus bradycardia on EKG; but history of paroxysmal A-fib  Will place on telemetry, consider cardiology consult if abnormal telemetry  Check echo; most recent done 8/2024-EF 60%, normal diastolic function  Report decreased oral intake for the last couple days, will gently hydrate  Check orthostatic

## 2025-02-02 NOTE — ASSESSMENT & PLAN NOTE
Status post right knee replacement on 1/29  Hemoglobin 10.5 on the date of surgery, on admission globin is 8.7  Denies any obvious bleeding  Will continue to hold aspirin for now, but continue Xarelto  Monitor CBC; may resume aspirin tomorrow if hemoglobin stable

## 2025-02-03 ENCOUNTER — APPOINTMENT (OUTPATIENT)
Dept: NON INVASIVE DIAGNOSTICS | Facility: HOSPITAL | Age: 60
End: 2025-02-03
Attending: FAMILY MEDICINE
Payer: COMMERCIAL

## 2025-02-03 VITALS
HEIGHT: 64 IN | TEMPERATURE: 98.6 F | HEART RATE: 62 BPM | DIASTOLIC BLOOD PRESSURE: 66 MMHG | BODY MASS INDEX: 38.41 KG/M2 | RESPIRATION RATE: 20 BRPM | OXYGEN SATURATION: 96 % | WEIGHT: 225 LBS | SYSTOLIC BLOOD PRESSURE: 141 MMHG

## 2025-02-03 LAB
ANION GAP SERPL CALCULATED.3IONS-SCNC: 6 MMOL/L (ref 4–13)
BASOPHILS # BLD AUTO: 0.04 THOUSANDS/ΜL (ref 0–0.1)
BASOPHILS NFR BLD AUTO: 0 % (ref 0–1)
BSA FOR ECHO PROCEDURE: 2.06 M2
BUN SERPL-MCNC: 18 MG/DL (ref 5–25)
CALCIUM SERPL-MCNC: 8.2 MG/DL (ref 8.4–10.2)
CHLORIDE SERPL-SCNC: 110 MMOL/L (ref 96–108)
CO2 SERPL-SCNC: 25 MMOL/L (ref 21–32)
CREAT SERPL-MCNC: 0.69 MG/DL (ref 0.6–1.3)
EOSINOPHIL # BLD AUTO: 0.29 THOUSAND/ΜL (ref 0–0.61)
EOSINOPHIL NFR BLD AUTO: 3 % (ref 0–6)
ERYTHROCYTE [DISTWIDTH] IN BLOOD BY AUTOMATED COUNT: 14.4 % (ref 11.6–15.1)
GFR SERPL CREATININE-BSD FRML MDRD: 94 ML/MIN/1.73SQ M
GLUCOSE SERPL-MCNC: 89 MG/DL (ref 65–140)
HCT VFR BLD AUTO: 27.1 % (ref 34.8–46.1)
HGB BLD-MCNC: 8.8 G/DL (ref 11.5–15.4)
IMM GRANULOCYTES # BLD AUTO: 0.04 THOUSAND/UL (ref 0–0.2)
IMM GRANULOCYTES NFR BLD AUTO: 0 % (ref 0–2)
LV EF US.2D.A4C+ESTIMATED: 66 %
LYMPHOCYTES # BLD AUTO: 2.21 THOUSANDS/ΜL (ref 0.6–4.47)
LYMPHOCYTES NFR BLD AUTO: 24 % (ref 14–44)
MCH RBC QN AUTO: 33.5 PG (ref 26.8–34.3)
MCHC RBC AUTO-ENTMCNC: 32.5 G/DL (ref 31.4–37.4)
MCV RBC AUTO: 103 FL (ref 82–98)
MONOCYTES # BLD AUTO: 0.87 THOUSAND/ΜL (ref 0.17–1.22)
MONOCYTES NFR BLD AUTO: 9 % (ref 4–12)
NEUTROPHILS # BLD AUTO: 5.96 THOUSANDS/ΜL (ref 1.85–7.62)
NEUTS SEG NFR BLD AUTO: 64 % (ref 43–75)
NRBC BLD AUTO-RTO: 0 /100 WBCS
PLATELET # BLD AUTO: 417 THOUSANDS/UL (ref 149–390)
PMV BLD AUTO: 10.3 FL (ref 8.9–12.7)
POTASSIUM SERPL-SCNC: 3.6 MMOL/L (ref 3.5–5.3)
RA PRESSURE ESTIMATED: 3 MMHG
RBC # BLD AUTO: 2.63 MILLION/UL (ref 3.81–5.12)
RV PSP: 35 MMHG
SL CV LV EF: 65
SODIUM SERPL-SCNC: 141 MMOL/L (ref 135–147)
TR MAX PG: 32 MMHG
TR PEAK VELOCITY: 2.8 M/S
TRICUSPID VALVE PEAK REGURGITATION VELOCITY: 2.81 M/S
WBC # BLD AUTO: 9.41 THOUSAND/UL (ref 4.31–10.16)

## 2025-02-03 PROCEDURE — 93308 TTE F-UP OR LMTD: CPT

## 2025-02-03 PROCEDURE — 99239 HOSP IP/OBS DSCHRG MGMT >30: CPT | Performed by: INTERNAL MEDICINE

## 2025-02-03 PROCEDURE — 36415 COLL VENOUS BLD VENIPUNCTURE: CPT | Performed by: FAMILY MEDICINE

## 2025-02-03 PROCEDURE — 99232 SBSQ HOSP IP/OBS MODERATE 35: CPT | Performed by: HOSPITALIST

## 2025-02-03 PROCEDURE — 80048 BASIC METABOLIC PNL TOTAL CA: CPT | Performed by: FAMILY MEDICINE

## 2025-02-03 PROCEDURE — 93321 DOPPLER ECHO F-UP/LMTD STD: CPT

## 2025-02-03 PROCEDURE — 93321 DOPPLER ECHO F-UP/LMTD STD: CPT | Performed by: STUDENT IN AN ORGANIZED HEALTH CARE EDUCATION/TRAINING PROGRAM

## 2025-02-03 PROCEDURE — 93308 TTE F-UP OR LMTD: CPT | Performed by: STUDENT IN AN ORGANIZED HEALTH CARE EDUCATION/TRAINING PROGRAM

## 2025-02-03 PROCEDURE — 93325 DOPPLER ECHO COLOR FLOW MAPG: CPT

## 2025-02-03 PROCEDURE — 85025 COMPLETE CBC W/AUTO DIFF WBC: CPT | Performed by: FAMILY MEDICINE

## 2025-02-03 PROCEDURE — 93325 DOPPLER ECHO COLOR FLOW MAPG: CPT | Performed by: STUDENT IN AN ORGANIZED HEALTH CARE EDUCATION/TRAINING PROGRAM

## 2025-02-03 PROCEDURE — 99214 OFFICE O/P EST MOD 30 MIN: CPT | Performed by: INTERNAL MEDICINE

## 2025-02-03 PROCEDURE — 99215 OFFICE O/P EST HI 40 MIN: CPT | Performed by: PSYCHIATRY & NEUROLOGY

## 2025-02-03 RX ADMIN — OXYCODONE HYDROCHLORIDE 10 MG: 10 TABLET ORAL at 14:21

## 2025-02-03 RX ADMIN — TOPIRAMATE 25 MG: 25 TABLET, FILM COATED ORAL at 10:49

## 2025-02-03 RX ADMIN — ACETAMINOPHEN 650 MG: 325 TABLET, FILM COATED ORAL at 14:21

## 2025-02-03 RX ADMIN — METOPROLOL SUCCINATE 25 MG: 25 TABLET, FILM COATED, EXTENDED RELEASE ORAL at 10:49

## 2025-02-03 RX ADMIN — BUPROPION HYDROCHLORIDE 150 MG: 150 TABLET, EXTENDED RELEASE ORAL at 08:46

## 2025-02-03 RX ADMIN — SODIUM CHLORIDE 50 ML/HR: 0.9 INJECTION, SOLUTION INTRAVENOUS at 08:57

## 2025-02-03 RX ADMIN — RIVAROXABAN 20 MG: 20 TABLET, FILM COATED ORAL at 08:46

## 2025-02-03 RX ADMIN — GABAPENTIN 800 MG: 400 CAPSULE ORAL at 08:46

## 2025-02-03 RX ADMIN — FLECAINIDE ACETATE 50 MG: 50 TABLET ORAL at 17:46

## 2025-02-03 RX ADMIN — TOPIRAMATE 25 MG: 25 TABLET, FILM COATED ORAL at 17:46

## 2025-02-03 RX ADMIN — ACETAMINOPHEN 650 MG: 325 TABLET, FILM COATED ORAL at 08:53

## 2025-02-03 RX ADMIN — OXYCODONE HYDROCHLORIDE 10 MG: 10 TABLET ORAL at 08:52

## 2025-02-03 RX ADMIN — BUSPIRONE HYDROCHLORIDE 10 MG: 10 TABLET ORAL at 08:46

## 2025-02-03 RX ADMIN — GABAPENTIN 800 MG: 400 CAPSULE ORAL at 17:46

## 2025-02-03 RX ADMIN — OXYCODONE HYDROCHLORIDE 10 MG: 10 TABLET ORAL at 02:49

## 2025-02-03 RX ADMIN — FLECAINIDE ACETATE 50 MG: 50 TABLET ORAL at 10:49

## 2025-02-03 RX ADMIN — ATORVASTATIN CALCIUM 40 MG: 40 TABLET, FILM COATED ORAL at 17:46

## 2025-02-03 NOTE — ASSESSMENT & PLAN NOTE
Based on history appears to be vasovagal.  However patient was sitting when she became lightheaded.  Denies any chest pain, shortness of breath or palpitations prior to syncopal episode.  EKG at the time of presentation shows sinus bradycardia.  Patient recently underwent knee placement, and hemoglobin is lower than her baseline.    Has history of TIA 6 months ago and was started on aspirin.  Is on Xarelto for PAF.    Not on telemetry overnight  Echocardiogram pending, if echocardiogram looks okay patient can be discharged  Will have her obtain a Zio patch x 2 weeks  Will have her set up for follow-up with her primary cardiologist

## 2025-02-03 NOTE — PLAN OF CARE
Problem: PAIN - ADULT  Goal: Verbalizes/displays adequate comfort level or baseline comfort level  Description: Interventions:  - Encourage patient to monitor pain and request assistance  - Assess pain using appropriate pain scale  - Administer analgesics based on type and severity of pain and evaluate response  - Implement non-pharmacological measures as appropriate and evaluate response  - Consider cultural and social influences on pain and pain management  - Notify physician/advanced practitioner if interventions unsuccessful or patient reports new pain  Outcome: Progressing     Problem: INFECTION - ADULT  Goal: Absence or prevention of progression during hospitalization  Description: INTERVENTIONS:  - Assess and monitor for signs and symptoms of infection  - Monitor lab/diagnostic results  - Monitor all insertion sites, i.e. indwelling lines, tubes, and drains  - Monitor endotracheal if appropriate and nasal secretions for changes in amount and color  - [place] appropriate cooling/warming therapies per order  - Administer medications as ordered  - Instruct and encourage patient and family to use good hand hygiene technique  - Identify and instruct in appropriate isolation precautions for identified infection/condition  Outcome: Progressing  Goal: Absence of fever/infection during neutropenic period  Description: INTERVENTIONS:  - Monitor WBC    Outcome: Progressing     Problem: SAFETY ADULT  Goal: Patient will remain free of falls  Description: INTERVENTIONS:  - Educate patient/family on patient safety including physical limitations  - Instruct patient to call for assistance with activity   - Consult OT/PT to assist with strengthening/mobility   - Keep Call bell within reach  - Keep bed low and locked with side rails adjusted as appropriate  - Keep care items and personal belongings within reach  - Initiate and maintain comfort rounds  - Make Fall Risk Sign visible to staff  - Offer Toileting every 2 Hours,  in advance of need  - Initiate/Maintain bed/chair alarm  - Obtain necessary fall risk management equipment: fall prevention socks   - Apply yellow socks and bracelet for high fall risk patients  - Consider moving patient to room near nurses station  Outcome: Progressing  Goal: Maintain or return to baseline ADL function  Description: INTERVENTIONS:  -  Assess patient's ability to carry out ADLs; assess patient's baseline for ADL function and identify physical deficits which impact ability to perform ADLs (bathing, care of mouth/teeth, toileting, grooming, dressing, etc.)  - Assess/evaluate cause of self-care deficits   - Assess range of motion  - Assess patient's mobility; develop plan if impaired  - Assess patient's need for assistive devices and provide as appropriate  - Encourage maximum independence but intervene and supervise when necessary  - Involve family in performance of ADLs  - Assess for home care needs following discharge   - Consider OT consult to assist with ADL evaluation and planning for discharge  - Provide patient education as appropriate  Outcome: Progressing  Goal: Maintains/Returns to pre admission functional level  Description: INTERVENTIONS:  - Perform AM-PAC 6 Click Basic Mobility/ Daily Activity assessment daily.  - Set and communicate daily mobility goal to care team and patient/family/caregiver.   - Collaborate with rehabilitation services on mobility goals if consulted  - Perform Range of Motion 4 times a day.  - Reposition patient every 2 hours.  - Dangle patient 4 times a day  - Stand patient 4 times a day  - Ambulate patient 4 times a day  - Out of bed to chair 4 times a day   - Out of bed for meals 4 times a day  - Out of bed for toileting  - Record patient progress and toleration of activity level   Outcome: Progressing     Problem: DISCHARGE PLANNING  Goal: Discharge to home or other facility with appropriate resources  Description: INTERVENTIONS:  - Identify barriers to discharge  w/patient and caregiver  - Arrange for needed discharge resources and transportation as appropriate  - Identify discharge learning needs (meds, wound care, etc.)  - Arrange for interpretive services to assist at discharge as needed  - Refer to Case Management Department for coordinating discharge planning if the patient needs post-hospital services based on physician/advanced practitioner order or complex needs related to functional status, cognitive ability, or social support system  Outcome: Progressing     Problem: Knowledge Deficit  Goal: Patient/family/caregiver demonstrates understanding of disease process, treatment plan, medications, and discharge instructions  Description: Complete learning assessment and assess knowledge base.  Interventions:  - Provide teaching at level of understanding  - Provide teaching via preferred learning methods  Outcome: Progressing

## 2025-02-03 NOTE — CONSULTS
Consultation - Neurology   Name: Lisa Tamayo 59 y.o. female I MRN: [id number]  Unit/Bed#: CW2 211-02 I Date of Admission: 2/2/2025   Date of Service: 2/3/2025 I Hospital Day: 0   Inpatient consult to Neurology  Consult performed by: Kiana Guaman MD  Consult ordered by: Osei Cunningham MD        Physician Requesting Evaluation: Osei Cunningham MD   Reason for Evaluation / Principal Problem: syncope vs TIA    Assessment & Plan  Syncope  Patient with hx of prior TIA. CTH on 2/2/2025 without acute intracranial abnormality. This episode with neurologic deficits which resolved are likely hypoperfusional due to low blood pressure with anemia in the setting of vasovagal syncope. Patient had low hemoglobin post procedure with poor fluid intake and low blood pressure. Given resolution of symptoms, this does not warrant further workup given patient's blood pressure was responsive to fluid boluses and patient's nonfocal neurologic exam.     No further neuroimaging indicated  Continue aspirin, lipitor, and xarelto daily for stroke prevention  Maintain adequate hydration and MAP>65 to avoid hypoperfusion  Remainder per primary  I have discussed the above management plan in detail with the primary service.   Neurology service signing off.    Patient has outpatient follow up with Dr. Dejesus on 2/13/2025.    History of Present Illness   Lisa Tamayo is a 59 y.o. left handed female who presents with syncope on admission. Family with concern for TIA give recent TIA. Per son and daughter in law, patient who is post op from knee replacement with poor po intake and hydration on opioid pain medication had speech issue with word finding difficulty, got the date wrong, called their dog a cat. Her eyes were closed shut, and when they opened her eyes were rolled to the back of her head. Her gums and lips were white. They could not feel a pulse in her neck when she passed out. She was awake after 15 seconds but took a good 10  minutes but return to her baseline. When EMS arrived her bp was quite low around 60/40 per family. They were concerned this event was similar to prior TIA. At the hospital, patient was at neurologic baseline and low blood pressure was fluid responsive.     Review of Systems   Neurological:  Positive for syncope and speech difficulty. Negative for dizziness, tremors, seizures, facial asymmetry, weakness, light-headedness, numbness and headaches.        I have reviewed the patient's PMH, PSH, Social History, Family History, Meds, and Allergies    Objective :  Temp:  [98.6 °F (37 °C)] 98.6 °F (37 °C)  HR:  [54-62] 62  BP: ()/(49-89) 141/66  Resp:  [16-20] 20  SpO2:  [96 %-100 %] 96 %  O2 Device: None (Room air)    Physical Exam  Eyes:      Extraocular Movements: Extraocular movements intact.      Pupils: Pupils are equal, round, and reactive to light.   Neurological:      Motor: Motor strength is normal.     Deep Tendon Reflexes:      Reflex Scores:       Bicep reflexes are 2+ on the right side and 2+ on the left side.       Brachioradialis reflexes are 2+ on the right side and 2+ on the left side.       Patellar reflexes are 2+ on the left side.  Psychiatric:         Speech: Speech normal.     Neurological Exam  Mental Status   Oriented to person, place and time. Speech is normal. Language is fluent with no aphasia.    Cranial Nerves  CN III, IV, VI: Extraocular movements intact bilaterally. Pupils equal round and reactive to light bilaterally.  CN V: Facial sensation is normal.  CN VII: Full and symmetric facial movement.  CN XII: Tongue midline without atrophy or fasciculations.    Motor   Strength is 5/5 throughout all four extremities.  Examination of RLE limited by recent knee procedure.    Sensory  Light touch is normal in upper and lower extremities.     Reflexes                                            Right                      Left  Brachioradialis                    2+                          2+  Biceps                                 2+                         2+  Patellar                                                        2+  Right patellar deferred due to post surgical pain.    Coordination  Right: Finger-to-nose normal.Left: Finger-to-nose normal.        Lab Results: I have reviewed the following results:I have personally reviewed pertinent reports.    Recent Labs     02/03/25  0439   WBC 9.41   HGB 8.8*   HCT 27.1*   *   SODIUM 141   K 3.6   *   CO2 25   BUN 18   CREATININE 0.69   GLUC 89     Imaging Results Review: I reviewed radiology reports from this admission including: CT head.      VTE Prophylaxis: VTE covered by:  rivaroxaban, Oral, 20 mg at 02/03/25 0846    and Sequential compression device (Venodyne)

## 2025-02-03 NOTE — ASSESSMENT & PLAN NOTE
As per patient she lost consciousness morning first time, but she has been complaining of dizziness for the last couple weeks sporadically; denies any shakiness or loss of bladder/bowel control  Sinus bradycardia on EKG; but history of paroxysmal A-fib  echo; most recent done 8/2024-EF 60%, normal diastolic function  Report decreased oral intake for the last couple days, will gently hydrate  Orthostatic Bps are WNL  Seen by cardiology recommending outpatient follow up for holter monitoring

## 2025-02-03 NOTE — ASSESSMENT & PLAN NOTE
As per patient she lost consciousness morning first time, but she has been complaining of dizziness for the last couple weeks sporadically; denies any shakiness or loss of bladder/bowel control  Sinus bradycardia on EKG; but history of paroxysmal A-fib  Continue telemetry monitoring  Check echo; most recent done 8/2024-EF 60%, normal diastolic function  Report decreased oral intake for the last couple days, will gently hydrate  Check orthostatic vital signs  Consult cardiology

## 2025-02-03 NOTE — DISCHARGE SUMMARY
Discharge Summary - Hospitalist   Name: Lisa Tamayo 59 y.o. female I MRN: [id number]  Unit/Bed#: CW2 211-02 I Date of Admission: 2/2/2025   Date of Service: 2/3/2025 I Hospital Day: 0     Assessment & Plan  Syncope  As per patient she lost consciousness morning first time, but she has been complaining of dizziness for the last couple weeks sporadically; denies any shakiness or loss of bladder/bowel control  Sinus bradycardia on EKG; but history of paroxysmal A-fib  echo; most recent done 8/2024-EF 60%, normal diastolic function  Report decreased oral intake for the last couple days, will gently hydrate  Orthostatic Bps are WNL  Seen by cardiology recommending outpatient follow up for holter monitoring    Acute blood loss anemia  Status post right knee replacement on 1/29  Hemoglobin 10.5 on the date of surgery, on admission globin is 8.7  Denies any obvious bleeding  Hemoglobin has stabilized  Resume xarelto and aspirin  Essential hypertension  On lisinopril 10 mg, metoprolol succinate 25 mg,  Blood pressure soft upon admission, but now in 130s  Resume metoprolol and lisinopril on discharge  Counseled on increased oral hydration  Mixed hyperlipidemia  Continue atorvastatin  PAF (paroxysmal atrial fibrillation) (HCC)  Paroxysmal, follows with cardiology, Dr. Sarah, last seen 12/3/24  Currently in sinus rhythm  On Xarelto, flecainide and metoprolol-will continue  Outpatient follow up with cardiology for holter monitoring  Depression, recurrent (HCC)  On Wellbutrin 100 mg daily, will continue  Reports being on sertraline as well but not been taking for the last week or so  Chronic headache  On Topamax 25 mg daily, will continue same  Status post right knee replacement  Rehospitalization for right knee replacement on 1/29  Was cleared by orthopedic surgeon to resume anticoagulation, she resume her Xarelto, but not aspirin  Denies any change in her knee pain, swelling or skin changes  Patient has been taking  Result note was sent to clinical team to see how his wrist was feeling after taking it. Uric acid is normal, does not need to take the colchicine. 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 No acute fracture or dislocation.     Helen Allen DO  [facility]  7/19/2024 9:19 AM       oxycodone at home for pain, will continue  Other insomnia  On Ambien at home, will continue to avoid withdrawals     Medical Problems       Resolved Problems  Date Reviewed: 1/22/2025   None       Discharging Physician / Practitioner: Alfredo Moses MD  PCP: Ann Duarte DO  Admission Date:   Admission Orders (From admission, onward)       Ordered        02/02/25 1722  Place in Observation  Once                          Discharge Date: 02/03/25    Consultations During Hospital Stay:  Cardiology  neurology    Procedures Performed:   Left Ventricle: Left ventricular cavity size is normal. Wall thickness is normal. The left ventricular ejection fraction is 65%. Systolic function is normal. Wall motion is normal.    Right Ventricle: Right ventricular cavity size is normal. Systolic function is normal.    Tricuspid Valve: There is mild regurgitation.    Prior TTE study available for comparison. Prior study date: 8/29/2024. No significant changes noted compared to the prior study.    Significant Findings / Test Results:   CT head no acute intracranial abnormality    Incidental Findings:   None       Test Results Pending at Discharge (will require follow up):   None     Outpatient Tests Requested:  Zio patch    Complications:  None    Reason for Admission: syncope    Hospital Course:   Lisa Tamayo is a 59 y.o. female patient who originally presented to the hospital on 2/2/2025 due to syncope. She was placed in observation for further evaluation. He was hydrated and Bps stabilized. She was seen by cardiology and recommended a zio patch. She was then seen by neurology and recommended outpatient follow up but TIA was not suspected. She remained stable. On the evening on 2/3 nursing reached out to this on call provider for discharge. Per Neurology resident she was cleared from their point of view for discharge and cardiology was also recommending follow up outpatient.       Please see above list of diagnoses and related  "plan for additional information.     Condition at Discharge: stable    Discharge Day Visit / Exam:   Subjective:  denies any dizzines or lightheadedness  Vitals: Blood Pressure: 141/66 (02/03/25 1604)  Pulse: 62 (02/03/25 1604)  Temperature: 98.6 °F (37 °C) (02/03/25 1549)  Temp Source: Oral (02/02/25 1301)  Respirations: 20 (02/03/25 0245)  Height: 5' 4\" (162.6 cm) (02/03/25 1015)  Weight - Scale: 102 kg (225 lb) (02/03/25 1015)  SpO2: 96 % (02/03/25 1604)  Physical Exam  Constitutional:       General: She is not in acute distress.     Appearance: Normal appearance. She is not toxic-appearing.   HENT:      Head: Normocephalic and atraumatic.      Nose: Nose normal.   Eyes:      Extraocular Movements: Extraocular movements intact.   Pulmonary:      Effort: Pulmonary effort is normal.   Skin:     Coloration: Skin is not pale.   Neurological:      Mental Status: She is alert and oriented to person, place, and time.   Psychiatric:         Mood and Affect: Mood normal.         Behavior: Behavior normal.          Discussion with Family: Updated  (daughter) at bedside.    Discharge instructions/Information to patient and family:   See after visit summary for information provided to patient and family.      Provisions for Follow-Up Care:  See after visit summary for information related to follow-up care and any pertinent home health orders.      Mobility at time of Discharge:   Basic Mobility Inpatient Raw Score: 17  JH-HLM Goal: 5: Stand one or more mins  JH-HLM Achieved: 8: Walk 250 feet ot more  HLM Goal achieved. Continue to encourage appropriate mobility.     Disposition:   Home    Planned Readmission: No    Discharge Medications:  See after visit summary for reconciled discharge medications provided to patient and/or family.      Administrative Statements   Discharge Statement:  I have spent a total time of 45 minutes in caring for this patient on the day of the visit/encounter. >30 minutes of time was " spent on: Diagnostic results, Instructions for management, Patient and family education, Impressions, Counseling / Coordination of care, Documenting in the medical record, Reviewing / ordering tests, medicine, procedures  , and Communicating with other healthcare professionals .    **Please Note: This note may have been constructed using a voice recognition system**

## 2025-02-03 NOTE — UTILIZATION REVIEW
Initial Clinical Review    Admission: Date/Time/Statement:   Admission Orders (From admission, onward)       Ordered        02/02/25 1722  Place in Observation  Once                          Orders Placed This Encounter   Procedures    Place in Observation     Standing Status:   Standing     Number of Occurrences:   1     Level of Care:   Med Surg [16]     ED Arrival Information       Expected   -    Arrival   2/2/2025 12:52    Acuity   Emergent              Means of arrival   Ambulance    Escorted by   [facility]    Service   Hospitalist    Admission type   Emergency              Arrival complaint   Syncope             Chief Complaint   Patient presents with    Syncope     Knee replacement R on Wednesday. Syncope in shower chair. GCS 15       Initial Presentation: 59 y.o. female  with PMHx includes PAFib, osteoarthritis s/p right knee arthroplasty 1/29/25, depression, chronic HA who presented on 2/2/25 to ED due to near syncopal episode while in the shower just PTA. Patient noted that she felt weak and dizzy in the shower and slumped to the ground. Denies LOC. Hypotensive for EMS. In the ED, /55, HR 55. Labs revealed WBC 11.70, hgb 8.7, hct 27.7, plts 391, Ca 7.7, alb 3.0, glucose 143, trop 3. UA positive for glucose, ketones, protein, bilirubin, urobili, wbc. CT head negative. EKG showed SB with ST & T abnormality. Given Tylenol and oxycodone x2 in ED.     Plan:  Admit Observation status Dx Syncope:   med surg, telemetry, Cardiology consult, order echo, give IVF for decreased PO intake for few days, check orthostatics. Hold ASA. Monitor CBC, hgb. Monitor BP and hold lisinopril.     Anticipated Length of Stay/Certification Statement: Patient will be admitted on an observation basis with an anticipated length of stay of less than 2 midnights secondary to syncope work up, monitor hemoglobin.     2/2 Per Cardiology: Syncope: Most likely vasovagal syncope, further compounded by anemia  postoperatively, with a hemoglobin around 8-9 with a baseline around 11-12 as well as recent usage of opiate medications post total knee replacement and decreased oral intake of fluids.  Orthostatics were negative although 5 hours after admission. Check echocardiogram, outpatient cardiac rhythm monitor to rule out any Bradycardic episodes/pauses or tachycardic episodes. Continue Xarelto, flecainide and metoprolol same doses.     ED Treatment-Medication Administration from 02/02/2025 1252 to 02/03/2025 0755         Date/Time Order Dose Route Action     02/02/2025 1505 EPINEPHrine (FOR EMS ONLY) (ADRENALIN) injection 1 mg 0 mg Does not apply Given to EMS     02/03/2025 0900 aspirin chewable tablet 81 mg -- Oral Held Dose     02/04/2025 0900 aspirin chewable tablet 81 mg -- Oral Held Dose     02/05/2025 0900 aspirin chewable tablet 81 mg -- Oral Held Dose     02/06/2025 0900 aspirin chewable tablet 81 mg -- Oral Held Dose     02/02/2025 2156 zolpidem (AMBIEN) tablet 10 mg 10 mg Oral Given     02/02/2025 2157 topiramate (TOPAMAX) tablet 25 mg 25 mg Oral Given     02/03/2025 0900 lisinopril (ZESTRIL) tablet 10 mg -- Oral Held Dose     02/04/2025 0900 lisinopril (ZESTRIL) tablet 10 mg -- Oral Held Dose     02/05/2025 0900 lisinopril (ZESTRIL) tablet 10 mg -- Oral Held Dose     02/06/2025 0900 lisinopril (ZESTRIL) tablet 10 mg -- Oral Held Dose     02/02/2025 2048 gabapentin (NEURONTIN) capsule 800 mg 800 mg Oral Given     02/02/2025 2106 flecainide (TAMBOCOR) tablet 50 mg 50 mg Oral Given     02/02/2025 2159 busPIRone (BUSPAR) tablet 10 mg 10 mg Oral Given     02/02/2025 1903 sodium chloride 0.9 % infusion 50 mL/hr Intravenous New Bag     02/02/2025 2048 acetaminophen (TYLENOL) tablet 650 mg 650 mg Oral Given     02/02/2025 2048 oxyCODONE (ROXICODONE) immediate release tablet 10 mg 10 mg Oral Given     02/03/2025 2309 oxyCODONE (ROXICODONE) immediate release tablet 10 mg 10 mg Oral Given     02/02/2025 6883 buPROPion  (WELLBUTRIN XL) 24 hr tablet 150 mg 150 mg Oral Given            Scheduled Medications:  aspirin, 81 mg, Oral, Daily  atorvastatin, 40 mg, Oral, QPM  buPROPion, 150 mg, Oral, Daily  busPIRone, 10 mg, Oral, BID  flecainide, 50 mg, Oral, BID  gabapentin, 800 mg, Oral, TID  [Held by provider] lisinopril, 10 mg, Oral, Daily  metoprolol succinate, 25 mg, Oral, Daily  rivaroxaban, 20 mg, Oral, Daily  topiramate, 25 mg, Oral, BID      Continuous IV Infusions:  sodium chloride, 50 mL/hr, Intravenous, Continuous      PRN Meds:  acetaminophen, 650 mg, Oral, Q6H PRN x3  oxyCODONE, 10 mg, Oral, Q4H PRN x4  oxyCODONE, 5 mg, Oral, Q4H PRN  zolpidem, 10 mg, Oral, HS PRN x1      ED Triage Vitals   Temperature Pulse Respirations Blood Pressure SpO2 Pain Score   02/02/25 1301 02/02/25 1300 02/02/25 1300 02/02/25 1300 02/02/25 1300 02/02/25 2030   97.5 °F (36.4 °C) 55 18 109/55 100 % No Pain     Weight (last 2 days)       Date/Time Weight    02/03/25 1015 102 (225)    02/02/25 1300 102 (225)            Vital Signs (last 3 days)       Date/Time Temp Pulse Resp BP MAP (mmHg) SpO2 O2 Device Patient Position - Orthostatic VS Pain    02/03/25 1432 -- 55 -- -- -- -- -- -- --    02/03/25 1421 -- -- -- -- -- -- -- -- 8    02/03/25 1100 -- -- -- -- -- -- -- -- 4    02/03/25 10:27:09 -- -- -- 119/63 82 -- -- -- --    02/03/25 10:23:36 -- 55 -- 133/89 104 97 % -- -- --    02/03/25 10:20:30 -- 54 -- 105/58 74 98 % -- -- --    02/03/25 1015 -- 56 -- 158/67 -- -- -- -- --    02/03/25 0853 -- -- -- -- -- -- -- -- 10 - Worst Possible Pain    02/03/25 0852 -- -- -- -- -- -- -- -- 10 - Worst Possible Pain    02/03/25 0249 -- -- -- -- -- -- -- -- 9    02/03/25 0245 -- 56 20 158/67 96 96 % -- -- --    02/02/25 2051 -- 60 16 129/64 88 100 % None (Room air) Lying 8    02/02/25 2048 -- -- -- -- -- -- -- -- 8    02/02/25 2030 -- 54 16 133/59 85 100 % None (Room air) Lying No Pain    02/02/25 1756 -- -- 18 130/68 -- -- -- Standing for 3 minutes - Orthostatic  VS --    02/02/25 1730 -- 56 20 133/63 91 99 % -- -- --    02/02/25 1301 97.5 °F (36.4 °C) -- -- -- -- -- -- -- --    02/02/25 1300 -- 55 18 109/55 -- 100 % None (Room air) -- --              Pertinent Labs/Diagnostic Test Results:   Radiology:  CT head wo contrast   Final Interpretation by Pallav N Shah, MD (02/02 1626)      No acute intracranial abnormality.                  Workstation performed: [id number]           Cardiology:  ECG 12 lead   Final Result by Martin Becerra MD (02/02 2249)     Age and gender specific ECG analysis     Sinus bradycardia   ST & T wave abnormality, consider anterior ischemia   Abnormal ECG   When compared with ECG of 28-Aug-2024 10:46,   No significant change was found   Confirmed by Martin Becerra ([id number]) on 2/2/2025 10:49:02 PM        GI:  No orders to display           Results from last 7 days   Lab Units 02/03/25  0439 02/02/25  1400 01/30/25  0421   WBC Thousand/uL 9.41 11.70*  --    HEMOGLOBIN g/dL 8.8* 8.7* 10.5*   HEMATOCRIT % 27.1* 27.7* 30.5*   PLATELETS Thousands/uL 417* 391*  --    TOTAL NEUT ABS Thousands/µL 5.96 8.75*  --          Results from last 7 days   Lab Units 02/03/25  0439 02/02/25  1400   SODIUM mmol/L 141 142   POTASSIUM mmol/L 3.6 3.6   CHLORIDE mmol/L 110* 113*   CO2 mmol/L 25 21   ANION GAP mmol/L 6 8   BUN mg/dL 18 18   CREATININE mg/dL 0.69 0.85   EGFR ml/min/1.73sq m 94 74   CALCIUM mg/dL 8.2* 7.7*     Results from last 7 days   Lab Units 02/02/25  1400   AST U/L 20   ALT U/L 15   ALK PHOS U/L 49   TOTAL PROTEIN g/dL 5.6*   ALBUMIN g/dL 3.0*   TOTAL BILIRUBIN mg/dL 0.41         Results from last 7 days   Lab Units 02/03/25  0439 02/02/25  1400   GLUCOSE RANDOM mg/dL 89 143*       Results from last 7 days   Lab Units 02/02/25  1628 02/02/25  1400   HS TNI 0HR ng/L  --  3   HS TNI 4HR ng/L 3  --    HSTNI D4 ng/L 0  --        Results from last 7 days   Lab Units 02/02/25  1400   TSH 3RD GENERATON uIU/mL 2.246     Results from last 7 days   Lab Units  02/02/25  1618   CLARITY UA  Clear   COLOR UA  Yellow   SPEC GRAV UA  1.025   PH UA  6.0   GLUCOSE UA mg/dl 100 (1/10%)*   KETONES UA mg/dl Trace*   BLOOD UA  Negative   PROTEIN UA mg/dl Trace*   NITRITE UA  Negative   BILIRUBIN UA  Small*   UROBILINOGEN UA E.U./dl 4.0*   LEUKOCYTES UA  Negative   WBC UA /hpf 4-10*   RBC UA /hpf 1-2   BACTERIA UA /hpf None Seen   EPITHELIAL CELLS WET PREP /hpf Occasional   MUCUS THREADS  Occasional*     Past Medical History:   Diagnosis Date    A-fib (HCC)     Allergic     Anemia     Anxiety     Arthritis     Knees and back     Depression     Fall     Headache(784.0)     Hiatal hernia     Hyperlipidemia     Hypertension     Morbid obesity (HCC)     Obesity     Pneumonia     Stroke (HCC)      Present on Admission:   Essential hypertension   Mixed hyperlipidemia   PAF (paroxysmal atrial fibrillation) (HCC)   Depression, recurrent (HCC)   Chronic headache      Admitting Diagnosis: Syncope [R55]  Anemia [D64.9]  Age/Sex: 59 y.o. female    Network Utilization Review Department  ATTENTION: Please call with any questions or concerns to [phone] and carefully listen to the prompts so that you are directed to the right person. All voicemails are confidential.   For Discharge needs, contact Care Management DC Support Team at [phone] opt. [phone]  Send all requests for admission clinical reviews, approved or denied determinations and any other requests to dedicated fax number below belonging to the campus where the patient is receiving treatment. List of dedicated fax numbers for the Facilities:  FACILITY NAME UR FAX NUMBER   ADMISSION DENIALS (Administrative/Medical Necessity) [phone]   DISCHARGE SUPPORT TEAM (NETWORK) [phone]   PARENT CHILD HEALTH (Maternity/NICU/Pediatrics) [phone]   [facility] [phone]   [facility] [phone]   Critical access hospital [phone]   [facility]  [place] [phone]   Critical access hospital [phone]   [facility] [phone]   [facility] [phone]   [facility] [phone]   [facility] [phone]   [facility] [phone]   [facility] [phone]   [facility] [phone]

## 2025-02-03 NOTE — ASSESSMENT & PLAN NOTE
On lisinopril 10 mg, metoprolol succinate 25 mg,  Blood pressure soft upon admission, but now in 130s  Resume metoprolol and lisinopril on discharge  Counseled on increased oral hydration

## 2025-02-03 NOTE — ASSESSMENT & PLAN NOTE
Status post right knee replacement on 1/29  Hemoglobin 10.5 on the date of surgery, on admission globin is 8.7  Denies any obvious bleeding  Hemoglobin has stabilized  Resume xarelto and aspirin

## 2025-02-03 NOTE — CONSULTS
Consultation - Cardiology   Name: Lisa Tamayo 59 y.o. female I MRN: [id number]  Unit/Bed#: CW2 212-02 I Date of Admission: 2/2/2025   Date of Service: 2/3/2025 I Hospital Day: 0   Inpatient consult to Cardiology  Consult performed by: Luis Camp MD  Consult ordered by: Osei Cunningham MD        Physician Requesting Evaluation: Osei Cunningahm MD   Reason for Evaluation / Principal Problem: Syncope    Assessment & Plan  Syncope  Based on history appears to be vasovagal.  However patient was sitting when she became lightheaded.  Denies any chest pain, shortness of breath or palpitations prior to syncopal episode.  EKG at the time of presentation shows sinus bradycardia.  Patient recently underwent knee placement, and hemoglobin is lower than her baseline.    Has history of TIA 6 months ago and was started on aspirin.  Is on Xarelto for PAF.    Not on telemetry overnight  Echocardiogram pending, if echocardiogram looks okay patient can be discharged  Will have her obtain a Zio patch x 2 weeks  Will have her set up for follow-up with her primary cardiologist  Essential hypertension  On lisinopril 10 mg daily  Mixed hyperlipidemia  Cholesterol of 207, LDL of 145  Continue atorvastatin 40 mg daily  PAF (paroxysmal atrial fibrillation) (HCC)  Is on Xarelto, metoprolol and flecainide  EKG on presentation shows sinus bradycardia  Depression, recurrent (HCC)    Chronic headache    Status post right knee replacement    Acute blood loss anemia    Other insomnia        History of Present Illness   Lisa Tamayo is a 59 y.o. female with past medical history of hypertension, hyperlipidemia, PAF on Xarelto, recent history of TIA, s/p TKA on 1/29/2025 who presents after having an episode of syncope at home.  Patient states that she was sitting in a shower chair, once she finished with her shower she started to feel lightheaded, dizzy and was generally unwell.  She had called and her daughter-in-law who noted that she  was pale.  Her son who is at bedside also states that she could not recognize her recall the names of her kids or recognize their dog.  At the time of EMS arrival patient was sinus bradycardia and hypotensive with SBP of 60s.  Patient received 500 cc bolus of normal saline via EMS, with prompt increase in her SBP to greater than 90.    At the time of examination patient states that her symptoms are different from her symptoms experienced during her TIA.  Patient also endorses not drinking a lot of water after her recent surgery.  Denies having any chest pain, shortness of breath or palpitations preceding or antecedent to her syncopal event.    At the time of my evaluation she is euvolemic and hemodynamically stable.    Review of Systems  I have reviewed the patient's PMH, PSH, Social History, Family History, Meds, and Allergies    Objective :  Temp:  [97.5 °F (36.4 °C)] 97.5 °F (36.4 °C)  HR:  [54-60] 56  BP: (109-158)/(55-68) 158/67  Resp:  [16-20] 20  SpO2:  [96 %-100 %] 96 %  O2 Device: None (Room air)  Orthostatic Blood Pressures      Flowsheet Row Most Recent Value   Blood Pressure 158/67 filed at 02/03/2025 0245   Patient Position - Orthostatic VS Lying filed at 02/02/2025 2051          First Weight: Weight - Scale: 102 kg (225 lb) (02/02/25 1300)  Vitals:    02/02/25 1300   Weight: 102 kg (225 lb)       Physical Exam  Vitals and nursing note reviewed.   Constitutional:       General: She is not in acute distress.     Appearance: She is well-developed.   HENT:      Head: Normocephalic and atraumatic.      Mouth/Throat:      Mouth: Mucous membranes are moist.      Pharynx: No oropharyngeal exudate.   Eyes:      Extraocular Movements: Extraocular movements intact.      Conjunctiva/sclera: Conjunctivae normal.      Pupils: Pupils are equal, round, and reactive to light.   Cardiovascular:      Rate and Rhythm: Normal rate and regular rhythm.      Pulses: Normal pulses.      Heart sounds: No murmur  heard.  Pulmonary:      Effort: Pulmonary effort is normal. No respiratory distress.      Breath sounds: Normal breath sounds.   Abdominal:      Palpations: Abdomen is soft.      Tenderness: There is no abdominal tenderness.   Musculoskeletal:         General: Normal range of motion.      Cervical back: Normal range of motion and neck supple.      Right lower leg: No edema.      Left lower leg: No edema.   Skin:     General: Skin is warm and dry.   Neurological:      General: No focal deficit present.      Mental Status: She is alert and oriented to person, place, and time.   Psychiatric:         Mood and Affect: Mood normal.         Behavior: Behavior normal.           Lab Results: I have reviewed the following results:CBC/BMP:   .     02/03/25  0439   WBC 9.41   HGB 8.8*   HCT 27.1*   *   SODIUM 141   K 3.6   *   CO2 25   BUN 18   CREATININE 0.69   GLUC 89    , Creatinine Clearance: Estimated Creatinine Clearance: 99.5 mL/min (by C-G formula based on SCr of 0.69 mg/dL).  Results from last 7 days   Lab Units 02/03/25  0439 02/02/25  1400 01/30/25  0421   WBC Thousand/uL 9.41 11.70*  --    HEMOGLOBIN g/dL 8.8* 8.7* 10.5*   HEMATOCRIT % 27.1* 27.7* 30.5*   PLATELETS Thousands/uL 417* 391*  --      Results from last 7 days   Lab Units 02/03/25  0439 02/02/25  1400   POTASSIUM mmol/L 3.6 3.6   CHLORIDE mmol/L 110* 113*   CO2 mmol/L 25 21   BUN mg/dL 18 18   CREATININE mg/dL 0.69 0.85   CALCIUM mg/dL 8.2* 7.7*         Lab Results   Component Value Date    HGBA1C 5.3 08/29/2024     Lab Results   Component Value Date    TROPONINI <0.02 09/21/2021         Other Study Results Review: EKG was reviewed.     VTE Prophylaxis: VTE covered by:  rivaroxaban, Oral, 20 mg at 02/03/25 0846

## 2025-02-03 NOTE — ASSESSMENT & PLAN NOTE
Paroxysmal, follows with cardiology, Dr. Sarah, last seen 12/3/24  Currently in sinus rhythm  On Xarelto, flecainide and metoprolol-will continue  Outpatient follow up with cardiology for holter monitoring

## 2025-02-03 NOTE — PROGRESS NOTES
Progress Note - Hospitalist   Name: Lisa Tamayo 59 y.o. female I MRN: [id number]  Unit/Bed#: CW2 211-02 I Date of Admission: 2/2/2025   Date of Service: 2/3/2025 I Hospital Day: 0    Assessment & Plan  Syncope  As per patient she lost consciousness morning first time, but she has been complaining of dizziness for the last couple weeks sporadically; denies any shakiness or loss of bladder/bowel control  Sinus bradycardia on EKG; but history of paroxysmal A-fib  Continue telemetry monitoring  Check echo; most recent done 8/2024-EF 60%, normal diastolic function  Report decreased oral intake for the last couple days, will gently hydrate  Check orthostatic vital signs  Consult cardiology    Acute blood loss anemia  Status post right knee replacement on 1/29  Hemoglobin 10.5 on the date of surgery, on admission globin is 8.7  Denies any obvious bleeding  Will continue to hold aspirin for now, but continue Xarelto  Monitor CBC; may resume aspirin tomorrow if hemoglobin stable  Check iron profile in a.m.  Essential hypertension  On lisinopril 10 mg, metoprolol succinate 25 mg,  Blood pressure soft upon admission, but now in 130s  Will continue metoprolol for rate controlled, hold lisinopril for now  Mixed hyperlipidemia  Continue atorvastatin  PAF (paroxysmal atrial fibrillation) (HCC)  Paroxysmal, follows with cardiology, Dr. Sarah, last seen 12/3/24  Currently in sinus rhythm  On Xarelto, flecainide and metoprolol-will continue  Depression, recurrent (HCC)  On Wellbutrin 100 mg daily, will continue  Reports being on sertraline as well but not been taking for the last week or so  Chronic headache  On Topamax 25 mg daily, will continue same  Status post right knee replacement  Rehospitalization for right knee replacement on 1/29  Was cleared by orthopedic surgeon to resume anticoagulation, she resume her Xarelto, but not aspirin  Denies any change in her knee pain, swelling or skin changes  Patient has been taking  oxycodone at home for pain, will continue  Other insomnia  On Ambien at home, will continue to avoid withdrawals    VTE Pharmacologic Prophylaxis: VTE Score: 3 Xarelto    Patient Centered Rounds: I performed bedside rounds with nursing staff today.       Current Length of Stay: 0 day(s)  Current Patient Status: Observation     Code Status: Level 1 - Full Code    Subjective   Status post syncopal    Objective :  Temp:  [97.5 °F (36.4 °C)] 97.5 °F (36.4 °C)  HR:  [54-60] 55  BP: (105-158)/(55-89) 119/63  Resp:  [16-20] 20  SpO2:  [96 %-100 %] 97 %  O2 Device: None (Room air)    Body mass index is 38.62 kg/m².     Input and Output Summary (last 24 hours):   No intake or output data in the 24 hours ending 02/03/25 1052    Physical Exam  Constitutional:       Appearance: Normal appearance.   HENT:      Head: Normocephalic.      Nose: Nose normal.      Mouth/Throat:      Mouth: Mucous membranes are moist.   Eyes:      Extraocular Movements: Extraocular movements intact.      Pupils: Pupils are equal, round, and reactive to light.   Cardiovascular:      Rate and Rhythm: Normal rate and regular rhythm.      Heart sounds: Normal heart sounds. No murmur heard.  Pulmonary:      Effort: Pulmonary effort is normal. No respiratory distress.      Breath sounds: Normal breath sounds.   Abdominal:      General: Bowel sounds are normal. There is no distension.      Palpations: Abdomen is soft.   Musculoskeletal:         General: Normal range of motion.      Cervical back: Normal range of motion.   Skin:     General: Skin is warm and dry.      Capillary Refill: Capillary refill takes less than 2 seconds.   Neurological:      General: No focal deficit present.      Mental Status: She is alert. Mental status is at baseline.   Psychiatric:         Mood and Affect: Mood normal.           Telemetry:  Telemetry Orders (From admission, onward)               24 Hour Telemetry Monitoring  Continuous x 24 Hours (Telem)        Expiring    Question:  Reason for 24 Hour Telemetry  Answer:  Syncope suspected to be cardiac in origin                                  Lab Results: I have reviewed the following results:   Results from last 7 days   Lab Units 02/03/25  0439   WBC Thousand/uL 9.41   HEMOGLOBIN g/dL 8.8*   HEMATOCRIT % 27.1*   PLATELETS Thousands/uL 417*   SEGS PCT % 64   LYMPHO PCT % 24   MONO PCT % 9   EOS PCT % 3     Results from last 7 days   Lab Units 02/03/25  0439 02/02/25  1400   SODIUM mmol/L 141 142   POTASSIUM mmol/L 3.6 3.6   CHLORIDE mmol/L 110* 113*   CO2 mmol/L 25 21   BUN mg/dL 18 18   CREATININE mg/dL 0.69 0.85   ANION GAP mmol/L 6 8   CALCIUM mg/dL 8.2* 7.7*   ALBUMIN g/dL  --  3.0*   TOTAL BILIRUBIN mg/dL  --  0.41   ALK PHOS U/L  --  49   ALT U/L  --  15   AST U/L  --  20   GLUCOSE RANDOM mg/dL 89 143*                       Recent Cultures (last 7 days):         Last 24 Hours Medication List:     Current Facility-Administered Medications:     acetaminophen (TYLENOL) tablet 650 mg, Q6H PRN    [Held by provider] aspirin chewable tablet 81 mg, Daily    atorvastatin (LIPITOR) tablet 40 mg, QPM    buPROPion (WELLBUTRIN XL) 24 hr tablet 150 mg, Daily    busPIRone (BUSPAR) tablet 10 mg, BID    flecainide (TAMBOCOR) tablet 50 mg, BID    gabapentin (NEURONTIN) capsule 800 mg, TID    [Held by provider] lisinopril (ZESTRIL) tablet 10 mg, Daily    metoprolol succinate (TOPROL-XL) 24 hr tablet 25 mg, Daily    oxyCODONE (ROXICODONE) immediate release tablet 10 mg, Q4H PRN    oxyCODONE (ROXICODONE) IR tablet 5 mg, Q4H PRN    rivaroxaban (XARELTO) tablet 20 mg, Daily    sodium chloride 0.9 % infusion, Continuous, Last Rate: 50 mL/hr (02/03/25 0857)    topiramate (TOPAMAX) tablet 25 mg, BID    zolpidem (AMBIEN) tablet 10 mg, HS PRN    Administrative Statements   Today, Patient Was Seen By: Osei Cunningham MD      **Please Note: This note may have been constructed using a voice recognition system.**

## 2025-02-03 NOTE — ASSESSMENT & PLAN NOTE
Patient with hx of prior TIA. CTH on 2/2/2025 without acute intracranial abnormality. This episode with neurologic deficits which resolved are likely hypoperfusional due to low blood pressure with anemia in the setting of vasovagal syncope. Patient had low hemoglobin post procedure with poor fluid intake and low blood pressure. Given resolution of symptoms, this does not warrant further workup given patient's blood pressure was responsive to fluid boluses and patient's nonfocal neurologic exam.     No further neuroimaging indicated  Continue aspirin, lipitor, and xarelto daily for stroke prevention  Maintain adequate hydration and MAP>65 to avoid hypoperfusion  Remainder per primary

## 2025-02-04 ENCOUNTER — TRANSITIONAL CARE MANAGEMENT (OUTPATIENT)
Dept: FAMILY MEDICINE CLINIC | Facility: CLINIC | Age: 60
End: 2025-02-04

## 2025-02-08 DIAGNOSIS — M25.562 CHRONIC PAIN OF BOTH KNEES: ICD-10-CM

## 2025-02-08 DIAGNOSIS — G89.29 CHRONIC PAIN OF BOTH KNEES: ICD-10-CM

## 2025-02-08 DIAGNOSIS — M25.561 CHRONIC PAIN OF BOTH KNEES: ICD-10-CM

## 2025-02-10 ENCOUNTER — TELEPHONE (OUTPATIENT)
Dept: NEUROLOGY | Facility: CLINIC | Age: 60
End: 2025-02-10

## 2025-02-10 RX ORDER — GABAPENTIN 800 MG/1
800 TABLET ORAL 3 TIMES DAILY
Qty: 90 TABLET | Refills: 1 | Status: SHIPPED | OUTPATIENT
Start: 2025-02-10

## 2025-02-11 ENCOUNTER — OFFICE VISIT (OUTPATIENT)
Dept: FAMILY MEDICINE CLINIC | Facility: CLINIC | Age: 60
End: 2025-02-11
Payer: COMMERCIAL

## 2025-02-11 VITALS
SYSTOLIC BLOOD PRESSURE: 114 MMHG | HEIGHT: 64 IN | RESPIRATION RATE: 18 BRPM | OXYGEN SATURATION: 95 % | DIASTOLIC BLOOD PRESSURE: 68 MMHG | TEMPERATURE: 97.5 F | HEART RATE: 67 BPM | BODY MASS INDEX: 38.62 KG/M2

## 2025-02-11 DIAGNOSIS — E66.9 OBESITY (BMI 30-39.9): ICD-10-CM

## 2025-02-11 DIAGNOSIS — F41.1 GAD (GENERALIZED ANXIETY DISORDER): ICD-10-CM

## 2025-02-11 DIAGNOSIS — I10 ESSENTIAL HYPERTENSION: ICD-10-CM

## 2025-02-11 DIAGNOSIS — R55 VASOVAGAL SYNCOPE: Primary | ICD-10-CM

## 2025-02-11 DIAGNOSIS — G45.9 TIA (TRANSIENT ISCHEMIC ATTACK): ICD-10-CM

## 2025-02-11 DIAGNOSIS — Z96.60 S/P JOINT REPLACEMENT: ICD-10-CM

## 2025-02-11 DIAGNOSIS — I48.0 PAF (PAROXYSMAL ATRIAL FIBRILLATION) (HCC): ICD-10-CM

## 2025-02-11 PROCEDURE — 99495 TRANSJ CARE MGMT MOD F2F 14D: CPT | Performed by: FAMILY MEDICINE

## 2025-02-11 RX ORDER — SERTRALINE HYDROCHLORIDE 150 MG/1
100 CAPSULE ORAL DAILY
Start: 2025-02-11

## 2025-02-11 NOTE — PROGRESS NOTES
Name: Lisa Tamayo      : 1965      MRN: [id number]  Encounter Provider: Ann Duarte DO  Encounter Date: 2025   Encounter department: [facility]  :  Assessment & Plan  Vasovagal syncope  Suspect dehydration, meds, vasodilation in shower  D/c lisinopril for now  Plan for eventual decrease in pain meds  Continue hydration  Holter per cards       ANNABELLA (generalized anxiety disorder)  Continue current meds    Orders:    Sertraline HCl 150 MG CAPS; Take 100 mg by mouth in the morning    Essential hypertension  Running low at home  D/c lisinopril  Monitor pressures at home and f/u 1 mo for vv       PAF (paroxysmal atrial fibrillation) (HCC)  Continue current meds including a/c       TIA (transient ischemic attack)  Continue blood pressure control and asa       Obesity (BMI 30-39.9)    Pt has been doing great with weight loss  Wants to d/c wellbutrin which I think is reasonable  Continue work with increased mvmt and dietary change         S/P joint replacement  Continue f/u with PT and ortho              History of Present Illness   HPI  TCM Call       Date and time call was made  2025  5:27 PM    Hospital care reviewed  Records reviewed    Patient was hospitialized at  [facility]    Date of Admission  25    Date of discharge  25    Diagnosis  syncope    Disposition  Home    Were the patients medications reviewed and updated  Yes    Current Symptoms  None          TCM Call       Post hospital issues  None    Should patient be enrolled in anticoag monitoring?  No    Scheduled for follow up?  Yes    Patients specialists  Cardiologist    Did you obtain your prescribed medications  Yes    Do you need help managing your prescriptions or medications  No    Is transportation to your appointment needed  No    I have advised the patient to call PCP with any new or worsening symptoms  Sushma HEALY LPN            Pt presents for f/u from recent hospitalization  "above.  Was admitted for obs after R knee replacement and then episode of pre-syncope in the shower at home.  She had decreased oral intake and used pain meds.  Was seen by cards who recommended outpt holter.  Sees dr molina for her PAF.    Continues with PT for her knee pain.  Now starting with R low back pain radiating down R Leg.  Seeing ortho.  Seeing PT.  No paresthesias or weakness    Wants to come off any meds she can.  Discussed wellbutrin which she is on for weight loss and which hasn't been helpful.  She would like to stop.      From a mood standpoint, more issues with anxiety.  On buspar and sertraline.    Blood pressure has been running low at home which likely contributed to her syncope.      Per son, she is very \"loopy\" on ambien.  Doesn't always sleep well with it anyway and adds melatonin or benadryl.  They wonder about changing to belsomra    Review of Systems  See hpi; all other systems negative    Objective   /68   Pulse 67   Temp 97.5 °F (36.4 °C) (Temporal)   Resp 18   Ht 5' 4\" (1.626 m)   SpO2 95%   BMI 38.62 kg/m²      Physical Exam  Constitutional:       Appearance: Normal appearance.   HENT:      Head: Normocephalic and atraumatic.      Right Ear: Tympanic membrane, ear canal and external ear normal.      Left Ear: Tympanic membrane, ear canal and external ear normal.      Nose: Nose normal. No congestion.      Mouth/Throat:      Mouth: Mucous membranes are moist.      Pharynx: No oropharyngeal exudate or posterior oropharyngeal erythema.   Eyes:      Extraocular Movements: Extraocular movements intact.      Conjunctiva/sclera: Conjunctivae normal.      Pupils: Pupils are equal, round, and reactive to light.   Neck:      Vascular: No carotid bruit.   Cardiovascular:      Rate and Rhythm: Normal rate and regular rhythm.      Heart sounds: No murmur heard.     No friction rub. No gallop.   Pulmonary:      Effort: Pulmonary effort is normal.      Breath sounds: No wheezing, rhonchi or " rales.   Abdominal:      General: Abdomen is flat. There is no distension.      Palpations: Abdomen is soft.      Tenderness: There is no abdominal tenderness.   Musculoskeletal:      Cervical back: Neck supple.      Comments: R knee incision intact, clean, dry . No signs of infection   Lymphadenopathy:      Cervical: No cervical adenopathy.   Neurological:      General: No focal deficit present.      Mental Status: She is alert and oriented to person, place, and time.      Cranial Nerves: No cranial nerve deficit.      Motor: No weakness.      Deep Tendon Reflexes: Reflexes normal.

## 2025-02-11 NOTE — PATIENT INSTRUCTIONS
Stop buproprion  Continue sertraline and buspar  Stop lisinopril  Take bps daily and record  F/u 1 month vv okay

## 2025-02-13 ENCOUNTER — OFFICE VISIT (OUTPATIENT)
Dept: NEUROLOGY | Facility: CLINIC | Age: 60
End: 2025-02-13
Payer: COMMERCIAL

## 2025-02-13 VITALS
HEIGHT: 64 IN | HEART RATE: 57 BPM | OXYGEN SATURATION: 96 % | WEIGHT: 220 LBS | SYSTOLIC BLOOD PRESSURE: 100 MMHG | TEMPERATURE: 97.4 F | BODY MASS INDEX: 37.56 KG/M2 | DIASTOLIC BLOOD PRESSURE: 62 MMHG

## 2025-02-13 DIAGNOSIS — I48.0 PAROXYSMAL ATRIAL FIBRILLATION (HCC): ICD-10-CM

## 2025-02-13 DIAGNOSIS — G89.29 CHRONIC HEADACHE: ICD-10-CM

## 2025-02-13 DIAGNOSIS — R51.9 CHRONIC HEADACHE: ICD-10-CM

## 2025-02-13 DIAGNOSIS — R41.3 MEMORY PROBLEM: Primary | ICD-10-CM

## 2025-02-13 DIAGNOSIS — G51.0 BELL'S PALSY: ICD-10-CM

## 2025-02-13 DIAGNOSIS — R29.90 STROKE-LIKE SYMPTOMS: ICD-10-CM

## 2025-02-13 DIAGNOSIS — E78.2 MIXED HYPERLIPIDEMIA: ICD-10-CM

## 2025-02-13 DIAGNOSIS — G45.9 TIA (TRANSIENT ISCHEMIC ATTACK): ICD-10-CM

## 2025-02-13 PROCEDURE — 99215 OFFICE O/P EST HI 40 MIN: CPT | Performed by: PSYCHIATRY & NEUROLOGY

## 2025-02-13 RX ORDER — ATORVASTATIN CALCIUM 80 MG/1
80 TABLET, FILM COATED ORAL EVERY EVENING
Qty: 30 TABLET | Refills: 3 | Status: SHIPPED | OUTPATIENT
Start: 2025-02-13

## 2025-02-13 RX ORDER — TOPIRAMATE 50 MG/1
50 TABLET, FILM COATED ORAL 2 TIMES DAILY
Qty: 60 TABLET | Refills: 3 | Status: SHIPPED | OUTPATIENT
Start: 2025-02-13

## 2025-02-13 NOTE — PROGRESS NOTES
"Name: Lisa Tamayo      : 1965      MRN: [id number]  Encounter Provider: Danna Dejesus MD  Encounter Date: 2025   [facility]  :  Assessment & Plan  Memory problem  Recommend Mri brain neuroquant without contrast  TSH normal   Orders:    MRI brain NeuroQuant wo contrast; Future    Mixed hyperlipidemia    Orders:    atorvastatin (LIPITOR) 80 mg tablet; Take 1 tablet (80 mg total) by mouth every evening    Paroxysmal atrial fibrillation (HCC)  Continue with aspirin, xarelto and atorvastatin for stroke prevention       TIA (transient ischemic attack)  Secondary Prevention -   -for medications - recommend continuation of combination of aspirin, xarelto and atorvastatin (increase to 80mg PO QHS)  -Blood Pressure goal < 130/80, BP is at goal   -LDL goal <70, last LDL is 145  -had a home sleep study which was negative for RAMOS     Counseling/stroke education -   -I advised patient to avoid using NSAIDs for headaches or other pain and to stick to tylenol if needed  -Recommend lifestyle modifications such as mediterranean diet & regular exercise regimen atleast 4-5 times a week for 20-30 minutes.   -I educated patient/family regarding medication compliance  -encourage control of diabetes and hypertension; defer management to primary        Chronic headache  Current  treatment plan -   Preventative Regimen -   Medications -   Increased topamax 50mg PO BID   Past Failed/trial/contraindicated medications - several     Lifestyle Modifications:  1.Maintain headache diary.  We discussed an SREEDHAR for a smart phone is \"Migraine julia\"  2. When patient has a moderate to severe headache, they should seek rest, initiate relaxation and apply cold compresses to the head.   3. Hydration - Please drink at least 64 ounces of water a day to help remain hydrated.  4. Sleep -  Maintain regular sleep schedule. Adults need at least 7-8 hours of uninterrupted a night. " "Maintain good sleep hygiene:Adults need at least 7-8 hours of uninterrupted a night.   5. Diet - Patient is to have regular frequent meals to prevent headache onset. Avoid dietary trigger. (aged cheese, peanuts, MSG, aspartame and nitrates).  6. Medications - Limit over the counter medications such as Tylenol, Ibuprofen, Aleve, Excedrin. (No more than 3 times a week).  7. Exercise - Daily exercise is not only good for your heart but also good for your mental health. Recommend 4-5 times a week for 20 minutes.     Abortive Regimen   -cannot do triptans due to TIA  Recommend nurtec as needed, sent rx to pharmacy.     Acute treatment for headache in the office -   No headache       Botox regimen - not a candidate for botox    CGRPs - no      Imaging (Past, present or future)   -not at this time     Orders:    topiramate (Topamax) 50 MG tablet; Take 1 tablet (50 mg total) by mouth 2 (two) times a day    rimegepant sulfate (NURTEC) 75 mg TBDP; Take 1 tablet (75 mg total) by mouth once for 1 dose      Follow up in 6 months     I would be happy to see the patient sooner if any new questions/concerns arise.  Patient/Guardian was advised to the call the office if they have any questions and concerns in the meantime.     We discussed \"red flag\" headache symptoms including symptoms such as facial droop on one side, weakness/paralysis on either side, speech trouble, numbness on one side, balance issues, any vision changes, extreme dizziness or significant increase in severity of headache or a sudden onset severe headache, patient is to call 9-1-1 immediately or to proceed to the nearest ER.     Stroke-like symptoms    Orders:    atorvastatin (LIPITOR) 80 mg tablet; Take 1 tablet (80 mg total) by mouth every evening    Bell's palsy               History of Present Illness   HPI    59-year-old female who is here as a follow-up for history of TIA and migraines.    As far as her TIA is concerned she does not have any new TIAs or " "CVA-like symptoms she is compliant medication she is tolerating her medications well without any issues.  She is maintained on aspirin and atorvastatin for stroke prevention.    As far as her migraines are concerned she does have them pretty often states that like throbbing pulsating working on the right side of her brain scale of 1-10 headaches are about 6 out of 10.  She was taking Topamax 25 mg at bedtime and is interested in increasing the dose.  Does not have any side effects to the medication.  She cannot take Imitrex due to her history of TIA so recommended doing Nurtec instead.  She is also maintained on Xarelto for history of A-fib.    Review of Systems   Constitutional: Negative.    HENT: Negative.     Eyes: Negative.    Respiratory: Negative.     Cardiovascular: Negative.    Gastrointestinal: Negative.    Endocrine: Negative.    Genitourinary: Negative.    Musculoskeletal: Negative.    Skin: Negative.    Allergic/Immunologic: Negative.    Neurological: Negative.    Hematological: Negative.    Psychiatric/Behavioral: Negative.     All other systems reviewed and are negative.   I have personally reviewed the MA's review of systems and made changes as necessary.         Objective   /62 (Patient Position: Sitting, Cuff Size: Standard)   Pulse 57   Temp (!) 97.4 °F (36.3 °C) (Temporal)   Ht 5' 4\" (1.626 m)   Wt 99.8 kg (220 lb)   SpO2 96%   BMI 37.76 kg/m²     Physical Exam  General - patient is alert   Speech - no dysarthria noted, no aphasia noted.     Neuro:   Cranial nerves: PERRL, EOMI, facial sensation intact to soft touch in V1, V2 and V3, mild L facial asymmetry noted, uvula/palate midline, tongue midline.   Motor: 5/5 throughout, normal tone, no pronator drift noted.   Sensory - intact to soft touch throughout  Reflexes - 2+ throughout  Coordination - no ataxia/dysmetria noted  Gait - normal    Neurological Exam        Administrative Statements   I have spent a total time of 40 minutes in " caring for this patient on the day of the visit/encounter including Risks and benefits of tx options, Instructions for management, Counseling / Coordination of care, Documenting in the medical record, Reviewing/placing orders in the medical record (including tests, medications, and/or procedures), Obtaining or reviewing history  , and Communicating with other healthcare professionals .

## 2025-02-13 NOTE — ASSESSMENT & PLAN NOTE
"Current  treatment plan -   Preventative Regimen -   Medications -   Increased topamax 50mg PO BID   Past Failed/trial/contraindicated medications - several     Lifestyle Modifications:  1.Maintain headache diary.  We discussed an SREEDHAR for a smart phone is \"Migraine buddy\"  2. When patient has a moderate to severe headache, they should seek rest, initiate relaxation and apply cold compresses to the head.   3. Hydration - Please drink at least 64 ounces of water a day to help remain hydrated.  4. Sleep -  Maintain regular sleep schedule. Adults need at least 7-8 hours of uninterrupted a night. Maintain good sleep hygiene:Adults need at least 7-8 hours of uninterrupted a night.   5. Diet - Patient is to have regular frequent meals to prevent headache onset. Avoid dietary trigger. (aged cheese, peanuts, MSG, aspartame and nitrates).  6. Medications - Limit over the counter medications such as Tylenol, Ibuprofen, Aleve, Excedrin. (No more than 3 times a week).  7. Exercise - Daily exercise is not only good for your heart but also good for your mental health. Recommend 4-5 times a week for 20 minutes.     Abortive Regimen   -cannot do triptans due to TIA  Recommend nurtec as needed, sent rx to pharmacy.     Acute treatment for headache in the office -   No headache       Botox regimen - not a candidate for botox    CGRPs - no      Imaging (Past, present or future)   -not at this time     Orders:    topiramate (Topamax) 50 MG tablet; Take 1 tablet (50 mg total) by mouth 2 (two) times a day    rimegepant sulfate (NURTEC) 75 mg TBDP; Take 1 tablet (75 mg total) by mouth once for 1 dose      Follow up in 6 months     I would be happy to see the patient sooner if any new questions/concerns arise.  Patient/Guardian was advised to the call the office if they have any questions and concerns in the meantime.     We discussed \"red flag\" headache symptoms including symptoms such as facial droop on one side, weakness/paralysis on " either side, speech trouble, numbness on one side, balance issues, any vision changes, extreme dizziness or significant increase in severity of headache or a sudden onset severe headache, patient is to call 9-1-1 immediately or to proceed to the nearest ER.

## 2025-02-13 NOTE — ASSESSMENT & PLAN NOTE
Recommend Mri brain neuroquant without contrast  TSH normal   Orders:    MRI brain NeuroQuant wo contrast; Future

## 2025-02-13 NOTE — ASSESSMENT & PLAN NOTE
Secondary Prevention -   -for medications - recommend continuation of combination of aspirin, xarelto and atorvastatin (increase to 80mg PO QHS)  -Blood Pressure goal < 130/80, BP is at goal   -LDL goal <70, last LDL is 145  -had a home sleep study which was negative for RAMOS     Counseling/stroke education -   -I advised patient to avoid using NSAIDs for headaches or other pain and to stick to tylenol if needed  -Recommend lifestyle modifications such as mediterranean diet & regular exercise regimen atleast 4-5 times a week for 20-30 minutes.   -I educated patient/family regarding medication compliance  -encourage control of diabetes and hypertension; defer management to primary

## 2025-02-14 DIAGNOSIS — F51.01 PRIMARY INSOMNIA: ICD-10-CM

## 2025-02-14 PROBLEM — E66.01 MORBID (SEVERE) OBESITY DUE TO EXCESS CALORIES (HCC): Status: RESOLVED | Noted: 2022-07-08 | Resolved: 2025-02-14

## 2025-02-14 PROBLEM — F33.9 DEPRESSION, RECURRENT (HCC): Status: RESOLVED | Noted: 2022-06-07 | Resolved: 2025-02-14

## 2025-02-14 PROBLEM — F32.2 SEVERE MAJOR DEPRESSIVE DISORDER (HCC): Status: RESOLVED | Noted: 2025-01-22 | Resolved: 2025-02-14

## 2025-02-14 PROBLEM — I48.0 PAROXYSMAL ATRIAL FIBRILLATION (HCC): Status: RESOLVED | Noted: 2024-10-28 | Resolved: 2025-02-14

## 2025-02-15 NOTE — ASSESSMENT & PLAN NOTE
Suspect dehydration, meds, vasodilation in shower  D/c lisinopril for now  Plan for eventual decrease in pain meds  Continue hydration  Holter per cards

## 2025-02-17 RX ORDER — ZOLPIDEM TARTRATE 10 MG/1
10 TABLET ORAL
Qty: 30 TABLET | Refills: 0 | Status: SHIPPED | OUTPATIENT
Start: 2025-02-17

## 2025-03-04 DIAGNOSIS — G89.29 CHRONIC PAIN OF BOTH KNEES: ICD-10-CM

## 2025-03-04 DIAGNOSIS — F51.01 PRIMARY INSOMNIA: ICD-10-CM

## 2025-03-04 DIAGNOSIS — M25.561 CHRONIC PAIN OF BOTH KNEES: ICD-10-CM

## 2025-03-04 DIAGNOSIS — M25.562 CHRONIC PAIN OF BOTH KNEES: ICD-10-CM

## 2025-03-05 ENCOUNTER — TELEPHONE (OUTPATIENT)
Age: 60
End: 2025-03-05

## 2025-03-05 RX ORDER — ZOLPIDEM TARTRATE 10 MG/1
10 TABLET ORAL
Qty: 30 TABLET | Refills: 0 | Status: SHIPPED | OUTPATIENT
Start: 2025-03-05

## 2025-03-05 RX ORDER — GABAPENTIN 800 MG/1
800 TABLET ORAL 3 TIMES DAILY
Qty: 90 TABLET | Refills: 0 | Status: SHIPPED | OUTPATIENT
Start: 2025-03-05

## 2025-03-05 NOTE — TELEPHONE ENCOUNTER
Nurtec requires a prior auth. This may be done through [facility], Key [id number].    _________________________________________________    Prior auth submitted. Awaiting determination.

## 2025-03-22 ENCOUNTER — HOSPITAL ENCOUNTER (OUTPATIENT)
Dept: MRI IMAGING | Facility: HOSPITAL | Age: 60
Discharge: HOME | End: 2025-03-22
Attending: PSYCHIATRY & NEUROLOGY
Payer: COMMERCIAL

## 2025-03-22 DIAGNOSIS — R41.3 MEMORY PROBLEM: ICD-10-CM

## 2025-03-22 PROCEDURE — 70551 MRI BRAIN STEM W/O DYE: CPT

## 2025-03-24 DIAGNOSIS — I48.0 PAF (PAROXYSMAL ATRIAL FIBRILLATION) (HCC): ICD-10-CM

## 2025-03-26 RX ORDER — FLECAINIDE ACETATE 50 MG/1
50 TABLET ORAL 2 TIMES DAILY
Qty: 180 TABLET | Refills: 3 | Status: SHIPPED | OUTPATIENT
Start: 2025-03-26

## 2025-03-31 DIAGNOSIS — I48.0 PAF (PAROXYSMAL ATRIAL FIBRILLATION) (HCC): ICD-10-CM

## 2025-04-13 DIAGNOSIS — F51.01 PRIMARY INSOMNIA: ICD-10-CM

## 2025-04-15 DIAGNOSIS — F51.01 PRIMARY INSOMNIA: ICD-10-CM

## 2025-04-15 RX ORDER — ZOLPIDEM TARTRATE 10 MG/1
10 TABLET ORAL
Qty: 30 TABLET | Refills: 0 | Status: SHIPPED | OUTPATIENT
Start: 2025-04-15

## 2025-04-16 RX ORDER — ZOLPIDEM TARTRATE 10 MG/1
10 TABLET ORAL
Qty: 30 TABLET | Refills: 0 | OUTPATIENT
Start: 2025-04-16

## 2025-04-25 ENCOUNTER — TELEPHONE (OUTPATIENT)
Dept: FAMILY MEDICINE CLINIC | Facility: CLINIC | Age: 60
End: 2025-04-25

## 2025-04-25 NOTE — TELEPHONE ENCOUNTER
Name of procedure:Left total knee replacement  Diagnosis: M17.12 unilateral primary osteoarthritis, left knee  Date of procedure (within 30 days): 6/9/2025  Surgeon: Real Boogie MD  Location of procedure (hospital or out patient facility name): [facility] [place] date/location/ type (Which labs? EKG? CXR?): cxr at [facility], EKG at PCP office, Labs at [facility]  Records (on epic or requested):  Epic  Type of anaesthesia:  choice  Paperwork to complete for Pre-op (patient bringing vs. calling office to obtain prior to appointment): received via fax, placed in Dr. Duarte's pre op folder  Pre-op appointment scheduled (date/time): 6/4/25 at 4:30pm

## 2025-04-29 ENCOUNTER — TELEPHONE (OUTPATIENT)
Age: 60
End: 2025-04-29

## 2025-04-29 NOTE — TELEPHONE ENCOUNTER
Nancy from [facility] calling regarding cardiac clearance letter.   Letter was generated with 48 hour Xarelto hold and 5 day Aspirin hold per Dr. Sarah.     Orthopedic surgery is requesting 3 day Xarelto hold and 7 day Aspirin hold.    Please review and contact Nancy at [phone].    [facility] fax# [phone]

## 2025-05-01 DIAGNOSIS — G89.29 CHRONIC HEADACHE: ICD-10-CM

## 2025-05-01 DIAGNOSIS — R51.9 CHRONIC HEADACHE: ICD-10-CM

## 2025-05-01 RX ORDER — RIMEGEPANT SULFATE 75 MG/75MG
75 TABLET, ORALLY DISINTEGRATING ORAL DAILY
Qty: 1 TABLET | Refills: 0 | Status: SHIPPED | OUTPATIENT
Start: 2025-05-01 | End: 2025-05-02

## 2025-05-10 DIAGNOSIS — F51.01 PRIMARY INSOMNIA: ICD-10-CM

## 2025-05-12 RX ORDER — ZOLPIDEM TARTRATE 10 MG/1
10 TABLET ORAL
Qty: 30 TABLET | Refills: 0 | Status: SHIPPED | OUTPATIENT
Start: 2025-05-12

## 2025-05-27 ENCOUNTER — RA CDI HCC (OUTPATIENT)
Dept: OTHER | Facility: HOSPITAL | Age: 60
End: 2025-05-27

## 2025-05-27 PROBLEM — U07.1 COVID: Status: RESOLVED | Noted: 2024-08-28 | Resolved: 2025-05-27

## 2025-05-30 DIAGNOSIS — F51.01 PRIMARY INSOMNIA: ICD-10-CM

## 2025-05-30 RX ORDER — ZOLPIDEM TARTRATE 10 MG/1
10 TABLET ORAL
Qty: 30 TABLET | Refills: 0 | Status: SHIPPED | OUTPATIENT
Start: 2025-05-30

## 2025-06-03 DIAGNOSIS — R29.90 STROKE-LIKE SYMPTOMS: ICD-10-CM

## 2025-06-03 DIAGNOSIS — G89.29 CHRONIC HEADACHE: ICD-10-CM

## 2025-06-03 DIAGNOSIS — E78.2 MIXED HYPERLIPIDEMIA: ICD-10-CM

## 2025-06-03 DIAGNOSIS — R51.9 CHRONIC HEADACHE: ICD-10-CM

## 2025-06-03 RX ORDER — TOPIRAMATE 50 MG/1
50 TABLET, FILM COATED ORAL 2 TIMES DAILY
Qty: 60 TABLET | Refills: 5 | Status: SHIPPED | OUTPATIENT
Start: 2025-06-03

## 2025-06-03 RX ORDER — ASPIRIN 81 MG
81 TABLET,CHEWABLE ORAL DAILY
Qty: 30 TABLET | Refills: 5 | Status: SHIPPED | OUTPATIENT
Start: 2025-06-03

## 2025-06-03 RX ORDER — ATORVASTATIN CALCIUM 80 MG/1
80 TABLET, FILM COATED ORAL EVERY EVENING
Qty: 30 TABLET | Refills: 5 | Status: SHIPPED | OUTPATIENT
Start: 2025-06-03

## 2025-06-04 ENCOUNTER — CONSULT (OUTPATIENT)
Dept: FAMILY MEDICINE CLINIC | Facility: CLINIC | Age: 60
End: 2025-06-04
Payer: COMMERCIAL

## 2025-06-04 VITALS
DIASTOLIC BLOOD PRESSURE: 62 MMHG | TEMPERATURE: 97.4 F | BODY MASS INDEX: 37.76 KG/M2 | SYSTOLIC BLOOD PRESSURE: 108 MMHG | HEIGHT: 64 IN | HEART RATE: 57 BPM | OXYGEN SATURATION: 98 %

## 2025-06-04 DIAGNOSIS — Z01.818 PREOPERATIVE CLEARANCE: Primary | ICD-10-CM

## 2025-06-04 DIAGNOSIS — G45.9 TIA (TRANSIENT ISCHEMIC ATTACK): ICD-10-CM

## 2025-06-04 DIAGNOSIS — F41.1 GAD (GENERALIZED ANXIETY DISORDER): ICD-10-CM

## 2025-06-04 DIAGNOSIS — M17.12 PRIMARY OSTEOARTHRITIS OF LEFT KNEE: ICD-10-CM

## 2025-06-04 DIAGNOSIS — F34.1 DYSTHYMIC DISORDER: ICD-10-CM

## 2025-06-04 DIAGNOSIS — I10 ESSENTIAL HYPERTENSION: ICD-10-CM

## 2025-06-04 DIAGNOSIS — I48.0 PAF (PAROXYSMAL ATRIAL FIBRILLATION) (HCC): ICD-10-CM

## 2025-06-04 PROBLEM — R29.90 STROKE-LIKE SYMPTOMS: Status: RESOLVED | Noted: 2024-08-28 | Resolved: 2025-06-04

## 2025-06-04 PROCEDURE — 99214 OFFICE O/P EST MOD 30 MIN: CPT | Performed by: FAMILY MEDICINE

## 2025-06-04 RX ORDER — TIRZEPATIDE 15 MG/.5ML
15 INJECTION, SOLUTION SUBCUTANEOUS WEEKLY
COMMUNITY
Start: 2025-05-30

## 2025-06-04 RX ORDER — BUSPIRONE HYDROCHLORIDE 10 MG/1
10 TABLET ORAL 2 TIMES DAILY
Qty: 60 TABLET | Refills: 1 | Status: SHIPPED | OUTPATIENT
Start: 2025-06-04

## 2025-06-04 RX ORDER — BUPROPION HYDROCHLORIDE 100 MG/1
100 TABLET, EXTENDED RELEASE ORAL 2 TIMES DAILY
Qty: 30 TABLET | Refills: 3 | Status: SHIPPED | OUTPATIENT
Start: 2025-06-04

## 2025-06-04 RX ORDER — CELECOXIB 200 MG/1
200 CAPSULE ORAL DAILY
COMMUNITY

## 2025-06-04 RX ORDER — LISINOPRIL 10 MG/1
1 TABLET ORAL DAILY
COMMUNITY
Start: 2025-05-04

## 2025-06-04 RX ORDER — TRAMADOL HYDROCHLORIDE 50 MG/1
50 TABLET ORAL EVERY 8 HOURS PRN
COMMUNITY
Start: 2025-05-12

## 2025-06-04 RX ORDER — BUPROPION HYDROCHLORIDE 100 MG/1
100 TABLET, EXTENDED RELEASE ORAL 2 TIMES DAILY
COMMUNITY
End: 2025-06-04 | Stop reason: SDUPTHER

## 2025-06-04 RX ORDER — RIMEGEPANT SULFATE 75 MG/75MG
75 TABLET, ORALLY DISINTEGRATING ORAL AS NEEDED
COMMUNITY
Start: 2025-04-02

## 2025-06-04 NOTE — PROGRESS NOTES
Pre-operative Clearance  Name: Lisa Tamayo      : 1965      MRN: [id number]  Encounter Provider: Ann Duarte DO  Encounter Date: 2025   Encounter department: [facility]    :  Assessment & Plan  Preoperative clearance  Low risk RCI  Stop xarelto 72 hours prior to surgery  Would continue with aspirin  Nml labs reviewed.    EKG shows sinus bradycardia at 47 bpm.  +PACs.  Nml axis.  Otherwise nml EKG  Medically optimized at this time       Primary osteoarthritis of left knee  Mgmt per ortho       PAF (paroxysmal atrial fibrillation) (HCC)  Continue rate and rhythm control  hold xarelto 72 hours before surgery       Essential hypertension  At goal  Continue current meds       TIA (transient ischemic attack)  Continue statin, blood pressure control, and asa if able       Dysthymic disorder  Ran out of wellbutrin   Refilled same  Orders:    buPROPion (WELLBUTRIN SR) 100 mg 12 hr tablet; Take 1 tablet (100 mg total) by mouth 2 (two) times a day    ANNABELLA (generalized anxiety disorder)  Ran out of buspar.  Notices more anxiety  Restart same  Orders:    busPIRone (BUSPAR) 10 mg tablet; Take 1 tablet (10 mg total) by mouth in the morning and 1 tablet (10 mg total) before bedtime.            Pre-operative Clearance:     Revised Cardiac Risk Index:  RCI RISK CLASS II (1 risk factor, risk of major cardiac complications approximately 1.3%)    Clearance:  Patient is medically optimized (CLEARED) for proposed surgery without any additional cardiac testing.      Medication Instructions:   - May take tylenol for pain up until the night before surgery    - ACE Inhibitors or ARBs: Continue this medication up to the evening before surgery/procedure, but do not take the morning of the day of surgery.  - Antidepressants: Continue to take this medication on your normal schedule.  - Antiepileptic meds: Continue to take this medication on your normal schedule.  - Beta blockers:  Continue to take this  medication on your normal schedule.  - Buspirone: Continue to take this medication on your normal schedule.  - Direct Xa Inhibitor (ie, Eliquis, Xarelto): Stop taking medication 3 days prior to procedure/surgery.  - GLP weight loss meds: Stop medication 1 week prior to procedure/surgery. If undergoing bariatric surgery, then stop medication 4 weeks prior.  - Hyperlipidemia meds: Continue to take this medication on your normal schedule.  - Opioids: Continue to take this medication on your normal schedule.  - Sleep meds (zolpidem, zaleplon, eszopiclone): Continue taking medication up to the evening before procedure/surgery, but do not take this medication on the morning of procedure/surgery.  - Other med instructions: Continue aspirin       History of Present Illness     Pre-Op Examination     Surgery: L TKR   Anticipated Date of Surgery: 6/9/25   Surgeon: Real Boogie MD     Pt has had years of b/l knee pain.  Had R TKR in April.  Convalesced well and is now ready for L TKR.  She has failed conservative therapy with injections, PT.  She has lost 80 lbs.      In general, pt feels well.  She can walk up 2 flights without chest pain, shortness of breath.  She can walk on a flat surface without being limited by chest pain, shortness of breath, calf pain.  She did well s/p R TKR other than some volume restriction causing lightheadedness     Previous history of bleeding disorders or clots?: No    Previous Anesthesia reaction?: No    Prolonged steroid use in the last 6 months?: No      Assessment of Cardiac Risk:   - Unstable or severe angina or MI in the last 6 weeks or history of stent placement in the last year?: No    - Decompensated heart failure (e.g. New onset heart failure, NYHA  Class IV heart failure, or worsening existing heart failure)?: No    - Significant arrhythmias such as high grade AV block, symptomatic ventricular arrhythmia, newly recognized ventricular tachycardia, supraventricular tachycardia with  resting heart rate >100, or symptomatic bradycardia?: No    - Severe heart valve disease including aortic stenosis or symptomatic mitral stenosis?: No      Pre-operative Risk Factors:  - Elevated-risk surgery: No    - History of cerebrovascular disease: Yes    - History of ischemic heart disease: No    - History of congestive heart failure: No    - Pre-operative treatment with insulin: No    - Pre-operative creatinine >2 mg/dL: No      Medications of Perioperative Concern:    Anti-platelet (aspirin, clopidogrel, ticagrelor, prasugrel, cilostazol, dipyridamole), Anti-coagulants (Coumadin, Xarelto, Pradaxa, Eliquis, Lixiana) and GLP agonists    Review of Systems   Constitutional:  Negative for chills, fatigue, fever and unexpected weight change.   HENT:  Negative for congestion, ear pain, hearing loss, postnasal drip, rhinorrhea, sinus pressure, sinus pain, sore throat, trouble swallowing and voice change.    Eyes:  Negative for pain, redness and visual disturbance.   Respiratory:  Negative for cough and shortness of breath.    Cardiovascular:  Negative for chest pain, palpitations and leg swelling.   Gastrointestinal:  Negative for abdominal pain, constipation, diarrhea and nausea.   Endocrine: Negative for cold intolerance, heat intolerance, polydipsia and polyuria.   Genitourinary:  Negative for dysuria, frequency and urgency.   Musculoskeletal:  Positive for arthralgias. Negative for joint swelling and myalgias.   Skin:  Negative for rash.        No suspicious lesions   Neurological:  Negative for weakness, numbness and headaches.   Hematological:  Negative for adenopathy.     Past Medical History   Past Medical History:   Diagnosis Date    A-fib (HCC)     Allergic     Anemia     Anxiety     Arthritis     Knees and back     COVID 08/28/2024    Depression     Diabetes mellitus (HCC)     Eczema     Fall     Headache(784.0)     Headache, tension-type     Hiatal hernia     Hyperlipidemia     Hypertension     Migraine      Morbid obesity (HCC)     Obesity     Pneumonia     Stroke (HCC)     TIA (transient ischemic attack)      Past Surgical History:   Procedure Laterality Date    BACK SURGERY  1998    BARIATRIC SURGERY  October 2016    ESOPHAGOGASTRODUODENOSCOPY  10/17/2016    HERNIA REPAIR  10/2016    HIATAL HERNIA REPAIR  10/17/2016    CIRO Tello DO       LUMBAR DISC SURGERY  05/1998    Approx    PARTIAL GASTRECTOMY  10/17/2016    CIRO Tello DO       SLEEVE GASTROPLASTY  10/2016    SPINE SURGERY      25 years ago    VAGINAL DELIVERY      x1     Family History   Problem Relation Name Age of Onset    Arthritis Mother Anahi Koch     COPD Mother Anahi Koch     Diabetes Mother Anahi Koch     Obesity Mother Anahi Koch     Coronary artery disease Mother Anahi Koch     Coronary artery disease Father Alberto Koch     Diabetes Father Alberto Koch     Heart attack Father Alberto Koch     Heart disease Father Alberto Koch     Obesity Father Alberto Koch     Early death Father Alberto Koch     Alzheimer's disease Paternal Grandfather Tanmay Koch     Dementia Paternal Grandfather Tanmay Koch      Social History     Tobacco Use    Smoking status: Never     Passive exposure: Never    Smokeless tobacco: Never   Vaping Use    Vaping status: Never Used   Substance and Sexual Activity    Alcohol use: Yes     Comment: On occasion … not on a regular basis    Drug use: Never    Sexual activity: Not Currently     Partners: Male     Current Outpatient Medications on File Prior to Visit   Medication Sig    Aspirin Low Dose 81 MG chewable tablet CHEW 1 TABLET (81 MG TOTAL) DAILY    atorvastatin (LIPITOR) 80 mg tablet TAKE ONE TABLET BY MOUTH EVERY EVENING    celecoxib (CeleBREX) 200 mg capsule Take 200 mg by mouth daily    flecainide (TAMBOCOR) 50 mg tablet TAKE ONE TABLET BY MOUTH TWICE A DAY    gabapentin (NEURONTIN) 800 mg tablet Take 1 tablet (800 mg total) by mouth 3 (three) times a day    lisinopril (ZESTRIL) 10 mg tablet Take 1 tablet by  "mouth in the morning    metoprolol succinate (TOPROL-XL) 25 mg 24 hr tablet Take 1 tablet (25 mg total) by mouth daily    rimegepant sulfate (Nurtec) 75 mg TBDP Take 75 mg by mouth as needed    rivaroxaban (Xarelto) 20 mg tablet Take 1 tablet (20 mg total) by mouth daily    Sertraline HCl 150 MG CAPS Take 100 mg by mouth in the morning    topiramate (TOPAMAX) 50 MG tablet TAKE ONE TABLET BY MOUTH TWICE A DAY    traMADol (ULTRAM) 50 mg tablet Take 50 mg by mouth every 8 (eight) hours as needed    Zepbound 15 MG/0.5ML auto-injector Inject 15 mg under the skin once a week    zolpidem (AMBIEN) 10 mg tablet TAKE ONE TABLET BY MOUTH DAILY AT BEDTIME AS NEEDED FOR SLEEP    [DISCONTINUED] buPROPion (WELLBUTRIN SR) 100 mg 12 hr tablet Take 100 mg by mouth 2 (two) times a day (Patient not taking: Reported on 6/4/2025)    [DISCONTINUED] busPIRone (BUSPAR) 10 mg tablet Take 1 tablet (10 mg total) by mouth 2 (two) times a day (Patient not taking: Reported on 6/4/2025)     Allergies   Allergen Reactions    Fentanyl Itching     Objective   /62 (Patient Position: Sitting, Cuff Size: Standard)   Pulse 57   Temp (!) 97.4 °F (36.3 °C) (Temporal)   Ht 5' 4\" (1.626 m)   SpO2 98%   BMI 37.76 kg/m²     Physical Exam  Constitutional:       Appearance: Normal appearance.   HENT:      Head: Normocephalic and atraumatic.      Right Ear: Tympanic membrane, ear canal and external ear normal.      Left Ear: Tympanic membrane, ear canal and external ear normal.      Nose: Nose normal. No congestion.      Mouth/Throat:      Mouth: Mucous membranes are moist.      Pharynx: No oropharyngeal exudate or posterior oropharyngeal erythema.     Eyes:      Extraocular Movements: Extraocular movements intact.      Conjunctiva/sclera: Conjunctivae normal.      Pupils: Pupils are equal, round, and reactive to light.     Neck:      Vascular: No carotid bruit.     Cardiovascular:      Rate and Rhythm: Normal rate and regular rhythm.      Heart " sounds: No murmur heard.     No friction rub. No gallop.   Pulmonary:      Effort: Pulmonary effort is normal.      Breath sounds: No wheezing, rhonchi or rales.   Abdominal:      General: Abdomen is flat. There is no distension.      Palpations: Abdomen is soft.      Tenderness: There is no abdominal tenderness.     Musculoskeletal:      Cervical back: Neck supple.   Lymphadenopathy:      Cervical: No cervical adenopathy.     Neurological:      General: No focal deficit present.      Mental Status: She is alert and oriented to person, place, and time.      Cranial Nerves: No cranial nerve deficit.      Motor: No weakness.      Deep Tendon Reflexes: Reflexes normal.           Ann Duarte DO

## 2025-06-04 NOTE — PATIENT INSTRUCTIONS
Off xarelto for 72 hours  Aspirin to stay      Pre-operative Medication Instructions    Avoid herbs or non-directed vitamins one week prior to surgery  Avoid aspirin containing medications or non-steroidal anti-inflammatory drugs one week preceding surgery  May take tylenol for pain up until the night before surgery    ACE Inhibitors or ARBs     Medication Name     lisinopril (ZESTRIL) 10 mg tablet      Continue this medication up to the evening before surgery/procedure, but do not take the morning of the day of surgery.    Antidepressant Meds     Medication Name     buPROPion (WELLBUTRIN SR) 100 mg 12 hr tablet     Sertraline HCl 150 MG CAPS     buPROPion (WELLBUTRIN SR) 100 mg 12 hr tablet (Discontinued)      Continue to take this medication on your normal schedule.  If this is an oral medication and you take it in the morning, then you may take this medicine with a sip of water.    Seizure Medication     Medication Name     gabapentin (NEURONTIN) 800 mg tablet     topiramate (TOPAMAX) 50 MG tablet      Continue to take this medication on your normal schedule.  If this is an oral medication and you take it in the morning, then you may take this medicine with a sip of water.    Beta Blockers     Medication Name     metoprolol succinate (TOPROL-XL) 25 mg 24 hr tablet      Continue to take this heart medication on your normal schedule.  If this is an oral medication and you take it in the morning, then you may take this medicine with a sip of water.    Buspirone     Medication Name     busPIRone (BUSPAR) 10 mg tablet     busPIRone (BUSPAR) 10 mg tablet (Discontinued)      Continue to take this medication on your normal schedule.  If this is an oral medication and you take it in the morning, then you may take this medicine with a sip of water.    Direct Xa Inhibitor     Medication Name     rivaroxaban (Xarelto) 20 mg tablet      STOP taking this medication at least 3 days prior to surgery/procedure with prescribing  Physician and Surgeon consultation.    GLP Weight Loss Drugs     Medication Name     Zepbound 15 MG/0.5ML auto-injector      For once weekly weight loss drugs, hold for 7 days. If undergoing bariatric surgery, then hold for 4 weeks.    Cholesterol lowering meds     Medication Name     atorvastatin (LIPITOR) 80 mg tablet      Continue to take this medication on your normal schedule.  If this is an oral medication and you take it in the morning, then you may take this medicine with a sip of water.    Opioid Medications     Medication Name     traMADol (ULTRAM) 50 mg tablet      Continue to take this medication on your normal schedule.  If this is an oral medication and you take it in the morning, then you may take this medicine with a sip of water.    Sleep Medication     Medication Name     zolpidem (AMBIEN) 10 mg tablet      Continue this medication up to the evening before surgery/procedure, but do not take the morning of the day of surgery.           Pre-operative Medication Instructions    Avoid herbs or non-directed vitamins one week prior to surgery  Avoid aspirin containing medications or non-steroidal anti-inflammatory drugs one week preceding surgery  May take tylenol for pain up until the night before surgery    ACE Inhibitors or ARBs     Medication Name     lisinopril (ZESTRIL) 10 mg tablet      Continue this medication up to the evening before surgery/procedure, but do not take the morning of the day of surgery.    Antidepressant Meds     Medication Name     buPROPion (WELLBUTRIN SR) 100 mg 12 hr tablet     Sertraline HCl 150 MG CAPS     buPROPion (WELLBUTRIN SR) 100 mg 12 hr tablet (Discontinued)      Continue to take this medication on your normal schedule.  If this is an oral medication and you take it in the morning, then you may take this medicine with a sip of water.    Seizure Medication     Medication Name     gabapentin (NEURONTIN) 800 mg tablet     topiramate (TOPAMAX) 50 MG tablet      Continue  to take this medication on your normal schedule.  If this is an oral medication and you take it in the morning, then you may take this medicine with a sip of water.    Beta Blockers     Medication Name     metoprolol succinate (TOPROL-XL) 25 mg 24 hr tablet      Continue to take this heart medication on your normal schedule.  If this is an oral medication and you take it in the morning, then you may take this medicine with a sip of water.    Buspirone     Medication Name     busPIRone (BUSPAR) 10 mg tablet     busPIRone (BUSPAR) 10 mg tablet (Discontinued)      Continue to take this medication on your normal schedule.  If this is an oral medication and you take it in the morning, then you may take this medicine with a sip of water.    Direct Xa Inhibitor     Medication Name     rivaroxaban (Xarelto) 20 mg tablet      STOP taking this medication at least 3 days prior to surgery/procedure with prescribing Physician and Surgeon consultation.    GLP Weight Loss Drugs     Medication Name     Zepbound 15 MG/0.5ML auto-injector      For once weekly weight loss drugs, hold for 7 days. If undergoing bariatric surgery, then hold for 4 weeks.    Cholesterol lowering meds     Medication Name     atorvastatin (LIPITOR) 80 mg tablet      Continue to take this medication on your normal schedule.  If this is an oral medication and you take it in the morning, then you may take this medicine with a sip of water.    Opioid Medications     Medication Name     traMADol (ULTRAM) 50 mg tablet      Continue to take this medication on your normal schedule.  If this is an oral medication and you take it in the morning, then you may take this medicine with a sip of water.    Sleep Medication     Medication Name     zolpidem (AMBIEN) 10 mg tablet      Continue this medication up to the evening before surgery/procedure, but do not take the morning of the day of surgery.

## 2025-06-04 NOTE — PROGRESS NOTES
"Name: Lisa Tamayo      : 1965      MRN: [id number]  Encounter Provider: Ann Duarte DO  Encounter Date: 2025   Encounter department: [facility]  :  Assessment & Plan  Preoperative clearance  .                History of Present Illness {?Quick Links Encounters * My Last Note * Last Note in Specialty * Snapshot * Since Last Visit * History :13629}  Pre-op Exam    Pre-operative Risk Factors:    History of cerebrovascular disease: Yes  History of ischemic heart disease: No  Pre-operative treatment with insulin: No  Pre-operative creatinine >2 mg/dL: No    History of congestive heart failure: No    [facility]    No chest pain, shortness of breath.  Can walk up 2 flights without chest pain, shortness of breath.  Can walk on a flat surface without chest pain, shortness of breath, calf pain.      Review of Systems    Objective {?Quick Links Trend Vitals * Enter New Vitals * Results Review * Timeline (Adult) * Labs * Imaging * Cardiology * Procedures * Lung Cancer Screening * Surgical eConsent :[id number]}  /62 (Patient Position: Sitting, Cuff Size: Standard)   Pulse 57   Temp (!) 97.4 °F (36.3 °C) (Temporal)   Ht 5' 4\" (1.626 m)   SpO2 98%   BMI 37.76 kg/m²      Physical Exam  {Administrative / Billing Section (Optional):30093}  "

## 2025-06-10 DIAGNOSIS — F51.01 PRIMARY INSOMNIA: ICD-10-CM

## 2025-06-10 RX ORDER — CELECOXIB 200 MG/1
200 CAPSULE ORAL DAILY
Refills: 0 | OUTPATIENT
Start: 2025-06-10

## 2025-06-10 RX ORDER — TRAMADOL HYDROCHLORIDE 50 MG/1
50 TABLET ORAL EVERY 8 HOURS PRN
Refills: 0 | OUTPATIENT
Start: 2025-06-10

## 2025-06-10 RX ORDER — ZOLPIDEM TARTRATE 10 MG/1
10 TABLET ORAL
Qty: 30 TABLET | Refills: 0 | OUTPATIENT
Start: 2025-06-10

## 2025-06-11 NOTE — TELEPHONE ENCOUNTER
Called pt to relay providers message, she stated the others were already taken care of and she only wanted the ambien from Dr. Duarte - let her know that the ambien was sent in to her preferred pharmacy previously, she expressed understanding and thanks.

## 2025-06-11 NOTE — TELEPHONE ENCOUNTER
Pt just had knee replacment--let her know ortho needs to prescribe all her pain meds and ambien was recently sent to pharmacy.  No need for these refills

## 2025-06-11 NOTE — TELEPHONE ENCOUNTER
Reviewed refill requests for ambien, tramadol, celebrex.     Pt just had 30 oxycodone filled on 6/4/25 prescribed by Marissa Phelps.  Had tramadol filled #30 on 5/12/25, 4/15/25, 3/5/25 prescribed by Dr Real Boogie.     Celebrex has never been filled here before.   Ambien refill was just sent 10 days ago to [facility] pharmacy.     Refills denied and will send to PCP to review.

## 2025-07-06 DIAGNOSIS — M25.562 CHRONIC PAIN OF BOTH KNEES: ICD-10-CM

## 2025-07-06 DIAGNOSIS — M25.561 CHRONIC PAIN OF BOTH KNEES: ICD-10-CM

## 2025-07-06 DIAGNOSIS — F51.01 PRIMARY INSOMNIA: ICD-10-CM

## 2025-07-06 DIAGNOSIS — G89.29 CHRONIC PAIN OF BOTH KNEES: ICD-10-CM

## 2025-07-06 RX ORDER — GABAPENTIN 800 MG/1
800 TABLET ORAL 3 TIMES DAILY
Qty: 90 TABLET | Refills: 5 | Status: SHIPPED | OUTPATIENT
Start: 2025-07-06

## 2025-07-07 RX ORDER — ZOLPIDEM TARTRATE 10 MG/1
10 TABLET ORAL
Qty: 30 TABLET | Refills: 0 | Status: SHIPPED | OUTPATIENT
Start: 2025-07-07

## 2025-07-30 ENCOUNTER — VBI (OUTPATIENT)
Dept: ADMINISTRATIVE | Facility: OTHER | Age: 60
End: 2025-07-30

## 2025-08-01 DIAGNOSIS — F41.1 GAD (GENERALIZED ANXIETY DISORDER): ICD-10-CM

## 2025-08-03 DIAGNOSIS — F51.01 PRIMARY INSOMNIA: ICD-10-CM

## 2025-08-03 DIAGNOSIS — G89.29 CHRONIC PAIN OF BOTH KNEES: ICD-10-CM

## 2025-08-03 DIAGNOSIS — M25.561 CHRONIC PAIN OF BOTH KNEES: ICD-10-CM

## 2025-08-03 DIAGNOSIS — M25.562 CHRONIC PAIN OF BOTH KNEES: ICD-10-CM

## 2025-08-03 RX ORDER — BUSPIRONE HYDROCHLORIDE 10 MG/1
TABLET ORAL
Qty: 60 TABLET | Refills: 1 | Status: SHIPPED | OUTPATIENT
Start: 2025-08-03

## 2025-08-03 RX ORDER — GABAPENTIN 800 MG/1
800 TABLET ORAL 3 TIMES DAILY
Qty: 90 TABLET | Refills: 0 | Status: CANCELLED | OUTPATIENT
Start: 2025-08-03

## 2025-08-04 DIAGNOSIS — E66.01 MORBID OBESITY WITH BMI OF 40.0-44.9, ADULT (HCC): Primary | ICD-10-CM

## 2025-08-05 RX ORDER — ZOLPIDEM TARTRATE 10 MG/1
10 TABLET ORAL
Qty: 30 TABLET | Refills: 0 | Status: SHIPPED | OUTPATIENT
Start: 2025-08-05

## 2025-08-06 RX ORDER — TIRZEPATIDE 15 MG/.5ML
INJECTION, SOLUTION SUBCUTANEOUS
Qty: 6 ML | Refills: 0 | Status: SHIPPED | OUTPATIENT
Start: 2025-08-06

## 2025-08-18 ENCOUNTER — TELEPHONE (OUTPATIENT)
Dept: NEUROLOGY | Facility: CLINIC | Age: 60
End: 2025-08-18

## 2025-08-19 ENCOUNTER — OFFICE VISIT (OUTPATIENT)
Dept: NEUROLOGY | Facility: CLINIC | Age: 60
End: 2025-08-19
Payer: COMMERCIAL

## 2025-08-19 VITALS
SYSTOLIC BLOOD PRESSURE: 106 MMHG | OXYGEN SATURATION: 98 % | DIASTOLIC BLOOD PRESSURE: 66 MMHG | HEART RATE: 92 BPM | WEIGHT: 193 LBS | HEIGHT: 64 IN | BODY MASS INDEX: 32.95 KG/M2 | TEMPERATURE: 98.1 F | RESPIRATION RATE: 18 BRPM

## 2025-08-19 DIAGNOSIS — G45.9 TIA (TRANSIENT ISCHEMIC ATTACK): ICD-10-CM

## 2025-08-19 DIAGNOSIS — R41.3 MEMORY PROBLEM: ICD-10-CM

## 2025-08-19 DIAGNOSIS — R51.9 CHRONIC HEADACHE: ICD-10-CM

## 2025-08-19 DIAGNOSIS — G89.29 CHRONIC HEADACHE: ICD-10-CM

## 2025-08-19 DIAGNOSIS — I48.0 PAF (PAROXYSMAL ATRIAL FIBRILLATION) (HCC): Primary | ICD-10-CM

## 2025-08-19 DIAGNOSIS — G62.9 NEUROPATHY: ICD-10-CM

## 2025-08-19 PROCEDURE — 99215 OFFICE O/P EST HI 40 MIN: CPT | Performed by: PSYCHIATRY & NEUROLOGY
